# Patient Record
Sex: FEMALE | Race: WHITE | Employment: UNEMPLOYED | URBAN - METROPOLITAN AREA
[De-identification: names, ages, dates, MRNs, and addresses within clinical notes are randomized per-mention and may not be internally consistent; named-entity substitution may affect disease eponyms.]

---

## 2019-06-04 ENCOUNTER — OFFICE VISIT (OUTPATIENT)
Dept: OBGYN CLINIC | Age: 49
End: 2019-06-04
Payer: COMMERCIAL

## 2019-06-04 VITALS — BODY MASS INDEX: 35.84 KG/M2 | WEIGHT: 223 LBS | HEIGHT: 66 IN

## 2019-06-04 DIAGNOSIS — G35 CHRONIC PROGRESSIVE MULTIPLE SCLEROSIS (HCC): ICD-10-CM

## 2019-06-04 DIAGNOSIS — Z01.419 WOMEN'S ANNUAL ROUTINE GYNECOLOGICAL EXAMINATION: ICD-10-CM

## 2019-06-04 DIAGNOSIS — Z12.39 ENCOUNTER FOR SPECIAL SCREENING EXAMINATION FOR NEOPLASM OF BREAST: ICD-10-CM

## 2019-06-04 DIAGNOSIS — Z01.419 WOMEN'S ANNUAL ROUTINE GYNECOLOGICAL EXAMINATION: Primary | ICD-10-CM

## 2019-06-04 PROCEDURE — 99396 PREV VISIT EST AGE 40-64: CPT | Performed by: OBSTETRICS & GYNECOLOGY

## 2019-06-04 RX ORDER — OMEPRAZOLE 20 MG/1
20 CAPSULE, DELAYED RELEASE ORAL DAILY
COMMUNITY
End: 2020-06-18

## 2019-06-04 RX ORDER — DALFAMPRIDINE 10 MG/1
10 TABLET, FILM COATED, EXTENDED RELEASE ORAL EVERY 12 HOURS
COMMUNITY
End: 2020-06-18

## 2019-06-04 RX ORDER — BISOPROLOL FUMARATE AND HYDROCHLOROTHIAZIDE 2.5; 6.25 MG/1; MG/1
1 TABLET ORAL DAILY
COMMUNITY

## 2019-06-04 RX ORDER — ACETAMINOPHEN 160 MG
3 TABLET,DISINTEGRATING ORAL DAILY
COMMUNITY

## 2019-06-04 ASSESSMENT — ENCOUNTER SYMPTOMS
CONSTIPATION: 0
VOICE CHANGE: 0
TROUBLE SWALLOWING: 0
COLOR CHANGE: 0
WHEEZING: 0
CHEST TIGHTNESS: 0
ABDOMINAL DISTENTION: 0
ABDOMINAL PAIN: 0
COUGH: 0
SORE THROAT: 0
VOMITING: 0
BLOOD IN STOOL: 0
NAUSEA: 0
SHORTNESS OF BREATH: 0
BACK PAIN: 0

## 2019-06-04 NOTE — PROGRESS NOTES
CHIEF COMPLAINT:  No problems. q 6 month infusion for her MS which increases risk of breast cancer. MS is stable  Chief Complaint   Patient presents with    Annual Exam         HISTORY OF PRESENT ILLNESS:  47 y.o. female presents for her annual exam. She had no concernsor complaints today. Her menses is  absent. She denies breakthrough bleeding, pelvic pain, or abnormal discharge. Bowel and bladder function is normal. Her health overallhas been good. Past Medical History:   Diagnosis Date    Abnormal Pap smear of cervix     GERD with stricture     Hypertension     MS (multiple sclerosis) (HCC)      Past Surgical History:   Procedure Laterality Date     SECTION      x 2    ESOPHAGEAL DILATATION      HYSTERECTOMY  10/2015    pt states left ovaries and tube     Family History   Problem Relation Age of Onset    Diabetes Father     Hypertension Father     Hypertension Mother     Stroke Mother     Heart Disease Mother     Heart Disease Brother      Social History     Socioeconomic History    Marital status:      Spouse name: Not on file    Number of children: Not on file    Years of education: Not on file    Highest education level: Not on file   Occupational History    Not on file   Social Needs    Financial resource strain: Not on file    Food insecurity:     Worry: Not on file     Inability: Not on file    Transportation needs:     Medical: Not on file     Non-medical: Not on file   Tobacco Use    Smoking status: Never Smoker    Smokeless tobacco: Never Used   Substance and Sexual Activity    Alcohol use:  Yes    Drug use: No    Sexual activity: Yes     Partners: Male   Lifestyle    Physical activity:     Days per week: Not on file     Minutes per session: Not on file    Stress: Not on file   Relationships    Social connections:     Talks on phone: Not on file     Gets together: Not on file     Attends Uats[employer] service: Not on file     Active member of club or organization: Not on file     Attends meetings of clubs or organizations: Not on file     Relationship status: Not on file    Intimate partner violence:     Fear of current or ex partner: Not on file     Emotionally abused: Not on file     Physically abused: Not on file     Forced sexual activity: Not on file   Other Topics Concern    Not on file   Social History Narrative    Not on file     Allergies:  Patient has no known allergies. Current Outpatient Medications on File Prior to Visit   Medication Sig Dispense Refill    Cholecalciferol (VITAMIN D3) 2000 units CAPS Take 3 capsules by mouth daily      Mirabegron ER (MYRBETRIQ) 50 MG TB24 Take 1 tablet by mouth      bisoprolol-hydrochlorothiazide (ZIAC) 2.5-6.25 MG per tablet Take 1 tablet by mouth daily      dalfampridine (AMPYRA) 10 MG extended release tablet Take 10 mg by mouth every 12 hours      omeprazole (PRILOSEC) 20 MG delayed release capsule Take 20 mg by mouth daily      Ocrelizumab (OCREVUS IV) Infuse intravenously every 6 months      Cholecalciferol (VITAMIN D-3 PO) Take by mouth       No current facility-administered medications on file prior to visit. OB History        8    Para   6    Term                AB        Living   6       SAB        TAB        Ectopic        Molar        Multiple        Live Births   6              Patient's medications, allergies, past medical, surgical,social and family histories were reviewed and updated as appropriate. Review of Systems   Constitutional: Negative for activity change, appetite change, fatigue and unexpected weight change. HENT: Negative for dental problem, ear pain, hearing loss, nosebleeds, sore throat, trouble swallowing and voice change. Eyes: Negative for visual disturbance. Respiratory: Negative for cough, chest tightness, shortness of breath and wheezing. Cardiovascular: Negative for chest pain and palpitations.    Gastrointestinal: Negative for abdominal distention, abdominal pain, blood in stool, constipation, nausea and vomiting. Endocrine: Negative for cold intolerance, heat intolerance, polydipsia, polyphagia and polyuria. Genitourinary: Negative for difficulty urinating, dyspareunia, dysuria, flank pain, frequency, genital sores, hematuria, menstrual problem, pelvic pain, urgency, vaginal bleeding, vaginal discharge and vaginal pain. Musculoskeletal: Negative for arthralgias, back pain, joint swelling and myalgias. Skin: Negative for color change and rash. Allergic/Immunologic: Negative for environmental allergies, food allergies and immunocompromised state. Neurological: Negative for dizziness, seizures, syncope, speech difficulty, weakness, numbness and headaches. Hematological: Negative for adenopathy. Does not bruise/bleed easily. Psychiatric/Behavioral: Negative for agitation, behavioral problems, confusion, decreased concentration, dysphoric mood and suicidal ideas. The patient is not nervous/anxious and is not hyperactive. All other systems reviewed and are negative. PHYSICAL EXAM  Ht 5' 5.98\" (1.676 m)   Wt 223 lb (101.2 kg)   BMI 36.01 kg/m²      Physical Exam   Constitutional: She is oriented to person, place, and time. She appears well-developed and well-nourished. HENT:   Head: Normocephalic and atraumatic. Eyes: Pupils are equal, round, and reactive to light. Neck: Normal range of motion. No tracheal deviation present. No thyromegaly present. Cardiovascular: Normal rate, regular rhythm and normal heart sounds. Pulmonary/Chest: Effort normal and breath sounds normal. She exhibits no tenderness. No breast tenderness or discharge. Abdominal: Soft. Bowel sounds are normal. She exhibits no distension and no mass. There is no tenderness. There is no rebound and no guarding. Hernia confirmed negative in the left inguinal area.    Genitourinary: Rectum normal and vagina normal. Rectal exam shows no external

## 2019-06-05 LAB
CLUE CELLS: ABNORMAL
TRICHOMONAS PREP: ABNORMAL
TRICHOMONAS VAGINALIS SCREEN: NEGATIVE
YEAST WET PREP: ABNORMAL

## 2020-05-14 ASSESSMENT — ENCOUNTER SYMPTOMS
BLOOD IN STOOL: 0
NAUSEA: 0
WHEEZING: 0
ABDOMINAL PAIN: 0
CHEST TIGHTNESS: 0
CONSTIPATION: 0
BACK PAIN: 0
VOMITING: 0
SHORTNESS OF BREATH: 0
COLOR CHANGE: 0
TROUBLE SWALLOWING: 0
COUGH: 0
SORE THROAT: 0
ABDOMINAL DISTENTION: 0
VOICE CHANGE: 0

## 2020-05-14 NOTE — PROGRESS NOTES
CHIEF COMPLAINT: No GYN complaints she is status post [facility]. She is dealing with MS. Chief Complaint   Patient presents with    Annual Exam         HISTORY OF PRESENT ILLNESS:  45 y.o. female presents for her annual exam. She had no concernsor complaints today. Her menses is  absent. She denies breakthrough bleeding, pelvic pain, or abnormal discharge. Bowel and bladder function is normal. Her health overallhas been good. Past Medical History:   Diagnosis Date    Abnormal Pap smear of cervix     GERD with stricture     Hypertension     MS (multiple sclerosis) (HCC)      Past Surgical History:   Procedure Laterality Date     SECTION      x 2    ESOPHAGEAL DILATATION      HYSTERECTOMY  10/2015    pt states left ovaries and tube     Family History   Problem Relation Age of Onset    Diabetes Father     Hypertension Father     Hypertension Mother     Stroke Mother     Heart Disease Mother     Heart Disease Brother      Social History     Socioeconomic History    Marital status:      Spouse name: Not on file    Number of children: Not on file    Years of education: Not on file    Highest education level: Not on file   Occupational History    Not on file   Social Needs    Financial resource strain: Not on file    Food insecurity     Worry: Not on file     Inability: Not on file    Transportation needs     Medical: Not on file     Non-medical: Not on file   Tobacco Use    Smoking status: Never Smoker    Smokeless tobacco: Never Used   Substance and Sexual Activity    Alcohol use:  Yes    Drug use: No    Sexual activity: Yes     Partners: Male   Lifestyle    Physical activity     Days per week: Not on file     Minutes per session: Not on file    Stress: Not on file   Relationships    Social connections     Talks on phone: Not on file     Gets together: Not on file     Attends Voodoo service: Not on file     Active member of club or organization: Not on file     Attends stool, constipation, nausea and vomiting. Endocrine: Negative for cold intolerance, heat intolerance, polydipsia, polyphagia and polyuria. Genitourinary: Negative for difficulty urinating, dyspareunia, dysuria, flank pain, frequency, genital sores, hematuria, menstrual problem, pelvic pain, urgency, vaginal bleeding, vaginal discharge and vaginal pain. Musculoskeletal: Negative for arthralgias, back pain, joint swelling and myalgias. Skin: Negative for color change and rash. Allergic/Immunologic: Negative for environmental allergies, food allergies and immunocompromised state. Neurological: Negative for dizziness, seizures, syncope, speech difficulty, weakness, numbness and headaches. Hematological: Negative for adenopathy. Does not bruise/bleed easily. Psychiatric/Behavioral: Negative for agitation, behavioral problems, confusion, decreased concentration, dysphoric mood and suicidal ideas. The patient is not nervous/anxious and is not hyperactive. All other systems reviewed and are negative. PHYSICAL EXAM  There were no vitals taken for this visit. Physical Exam  Vitals signs reviewed. Exam conducted with a chaperone present. Constitutional:       Appearance: Normal appearance. Chest:       Genitourinary:            Neurological:      Mental Status: She is alert. ASSESSMENT : Routine Annual Exam      Diagnosis Orders   1. Women's annual routine gynecological examination         PLAN:  Pap smear obtained  Orders Placed This Encounter   Procedures    TRE Screening Bilateral     This order was created through External Result Entry     No orders of the defined types were placed in this encounter. Repeat Annual every 1 year. New ASCCP guidelines regarding pap and Hi Risk HPV co-testing reviewed. Cervical Cytology Evaluation begins at 24years old. If Negative Cytology, Follow-upscreening per current  ASCCP guidelines. Mammograms every 1 year.  If 35 yo and last mammogram

## 2020-06-04 ENCOUNTER — OFFICE VISIT (OUTPATIENT)
Dept: OBGYN CLINIC | Age: 50
End: 2020-06-04

## 2020-06-18 ENCOUNTER — OFFICE VISIT (OUTPATIENT)
Dept: OBGYN CLINIC | Age: 50
End: 2020-06-18
Payer: COMMERCIAL

## 2020-06-18 VITALS
BODY MASS INDEX: 36.82 KG/M2 | SYSTOLIC BLOOD PRESSURE: 122 MMHG | DIASTOLIC BLOOD PRESSURE: 64 MMHG | HEART RATE: 78 BPM | WEIGHT: 228 LBS

## 2020-06-18 PROCEDURE — 99396 PREV VISIT EST AGE 40-64: CPT | Performed by: OBSTETRICS & GYNECOLOGY

## 2020-06-18 RX ORDER — SOLIFENACIN SUCCINATE 5 MG/1
TABLET, FILM COATED ORAL
COMMUNITY
Start: 2020-05-26

## 2020-06-18 RX ORDER — OMEPRAZOLE 40 MG/1
CAPSULE, DELAYED RELEASE ORAL
COMMUNITY
Start: 2020-04-21

## 2020-06-18 RX ORDER — DALFAMPRIDINE 10 MG/1
TABLET, FILM COATED, EXTENDED RELEASE ORAL
COMMUNITY
Start: 2019-05-03 | End: 2021-07-01

## 2020-06-18 ASSESSMENT — ENCOUNTER SYMPTOMS
COLOR CHANGE: 0
TROUBLE SWALLOWING: 0
COUGH: 0
VOMITING: 0
ABDOMINAL DISTENTION: 0
SHORTNESS OF BREATH: 0
SORE THROAT: 0
CHEST TIGHTNESS: 0
CONSTIPATION: 0
VOICE CHANGE: 0
WHEEZING: 0
NAUSEA: 0
BACK PAIN: 0
BLOOD IN STOOL: 0
ABDOMINAL PAIN: 0

## 2020-11-12 NOTE — PROGRESS NOTES
LETTER MAILED TO REMIND PATIENT I will SWITCH the dose or number of times a day I take the medications listed below when I get home from the hospital:  None

## 2020-11-18 ENCOUNTER — TELEPHONE (OUTPATIENT)
Dept: OBGYN CLINIC | Age: 50
End: 2020-11-18

## 2020-11-18 NOTE — TELEPHONE ENCOUNTER
Patient received a letter that she did not do her mammogram and bone density scan. Patient states she did get them done at [place]. The results are scanned in media 10/28/20.

## 2021-07-01 ENCOUNTER — OFFICE VISIT (OUTPATIENT)
Dept: OBGYN CLINIC | Age: 51
End: 2021-07-01
Payer: COMMERCIAL

## 2021-07-01 VITALS
HEART RATE: 83 BPM | DIASTOLIC BLOOD PRESSURE: 86 MMHG | SYSTOLIC BLOOD PRESSURE: 128 MMHG | WEIGHT: 228 LBS | BODY MASS INDEX: 36.82 KG/M2

## 2021-07-01 DIAGNOSIS — Z11.51 ENCOUNTER FOR SCREENING FOR HUMAN PAPILLOMAVIRUS (HPV): ICD-10-CM

## 2021-07-01 DIAGNOSIS — N89.8 VAGINAL IRRITATION: ICD-10-CM

## 2021-07-01 DIAGNOSIS — Z01.419 WOMEN'S ANNUAL ROUTINE GYNECOLOGICAL EXAMINATION: ICD-10-CM

## 2021-07-01 DIAGNOSIS — Z01.419 WOMEN'S ANNUAL ROUTINE GYNECOLOGICAL EXAMINATION: Primary | ICD-10-CM

## 2021-07-01 DIAGNOSIS — Z12.31 ENCOUNTER FOR SCREENING MAMMOGRAM FOR BREAST CANCER: ICD-10-CM

## 2021-07-01 PROBLEM — Z90.710 H/O: HYSTERECTOMY: Status: ACTIVE | Noted: 2021-07-01

## 2021-07-01 PROBLEM — M54.9 CHRONIC BACK PAIN: Status: ACTIVE | Noted: 2021-07-01

## 2021-07-01 PROBLEM — R26.9 ABNORMAL GAIT: Status: ACTIVE | Noted: 2021-07-01

## 2021-07-01 PROBLEM — G54.9 NERVE ROOT DISORDER: Status: ACTIVE | Noted: 2021-07-01

## 2021-07-01 PROBLEM — N31.9 NEUROGENIC BLADDER: Status: ACTIVE | Noted: 2021-07-01

## 2021-07-01 PROBLEM — G89.29 CHRONIC BACK PAIN: Status: ACTIVE | Noted: 2021-07-01

## 2021-07-01 PROBLEM — G35 MULTIPLE SCLEROSIS (HCC): Status: ACTIVE | Noted: 2021-07-01

## 2021-07-01 PROBLEM — M77.12 LATERAL EPICONDYLITIS OF LEFT ELBOW: Status: ACTIVE | Noted: 2021-07-01

## 2021-07-01 PROCEDURE — 99396 PREV VISIT EST AGE 40-64: CPT | Performed by: OBSTETRICS & GYNECOLOGY

## 2021-07-01 RX ORDER — CLOTRIMAZOLE AND BETAMETHASONE DIPROPIONATE 10; .64 MG/G; MG/G
CREAM TOPICAL
Qty: 45 G | Refills: 1 | Status: SHIPPED | OUTPATIENT
Start: 2021-07-01

## 2021-07-01 RX ORDER — FAMOTIDINE 40 MG/1
TABLET, FILM COATED ORAL
COMMUNITY

## 2021-07-01 RX ORDER — ECHINACEA PURPUREA EXTRACT 125 MG
TABLET ORAL
COMMUNITY
Start: 2021-02-06

## 2021-07-01 RX ORDER — POTASSIUM CHLORIDE 750 MG/1
TABLET, FILM COATED, EXTENDED RELEASE ORAL
COMMUNITY
Start: 2021-05-17

## 2021-07-01 RX ORDER — OMEPRAZOLE 20 MG/1
CAPSULE, DELAYED RELEASE ORAL
COMMUNITY
Start: 2021-06-20

## 2021-07-01 ASSESSMENT — ENCOUNTER SYMPTOMS
ABDOMINAL PAIN: 0
SORE THROAT: 0
COUGH: 0
ABDOMINAL DISTENTION: 0
WHEEZING: 0
VOICE CHANGE: 0
SHORTNESS OF BREATH: 0
NAUSEA: 0
VOMITING: 0
COLOR CHANGE: 0
CHEST TIGHTNESS: 0
BACK PAIN: 0
BLOOD IN STOOL: 0
CONSTIPATION: 0
TROUBLE SWALLOWING: 0

## 2021-07-01 NOTE — PROGRESS NOTES
CHIEF COMPLAINT: Patient is here for an annual exam.  She is a 50-year-old female with MS. She had Covid and was hospitalized back in January. She is noticing some vulvar irritation. Well as itching. Denies painful intercourse and denies of vasomotor symptoms. Her MS is under remission  Chief Complaint   Patient presents with    Annual Exam         HISTORY OF PRESENT ILLNESS:  45 y.o. female presents for her annual exam. She had no concernsor complaints today. Her menses is absent. She denies breakthrough bleeding, pelvic pain, or abnormal discharge. Bowel and bladder function is normal. Her health overallhas been good. Past Medical History:   Diagnosis Date    Abnormal Pap smear of cervix     GERD with stricture     Hypertension     MS (multiple sclerosis) (HCC)     Stress incontinence      Past Surgical History:   Procedure Laterality Date     SECTION      x 2    ESOPHAGEAL DILATATION      HYSTERECTOMY  10/2015    pt states left ovaries and tube     Family History   Problem Relation Age of Onset    Diabetes Father     Hypertension Father     Hypertension Mother     Stroke Mother     Heart Disease Mother     Heart Disease Brother     Breast Cancer Neg Hx      Social History     Socioeconomic History    Marital status:      Spouse name: Not on file    Number of children: Not on file    Years of education: Not on file    Highest education level: Not on file   Occupational History    Not on file   Tobacco Use    Smoking status: Never Smoker    Smokeless tobacco: Never Used   Vaping Use    Vaping Use: Never used   Substance and Sexual Activity    Alcohol use:  Yes    Drug use: No    Sexual activity: Yes     Partners: Male   Other Topics Concern    Not on file   Social History Narrative    Not on file     Social Determinants of Health     Financial Resource Strain:     Difficulty of Paying Living Expenses:    Food Insecurity:     Worried About Running Out of [employer] in the Last Year:    [address] in the Last Year:    Transportation Needs:     Lack of Transportation (Medical):  Lack of Transportation (Non-Medical):    Physical Activity:     Days of Exercise per Week:     Minutes of Exercise per Session:    Stress:     Feeling of Stress :    Social Connections:     Frequency of Communication with Friends and Family:     Frequency of Social Gatherings with Friends and Family:     Attends Synagogue Services:     Active Member of Clubs or Organizations:     Attends Club or Organization Meetings:     Marital Status:    Intimate Partner Violence:     Fear of Current or Ex-Partner:     Emotionally Abused:     Physically Abused:     Sexually Abused: Allergies:  Patient has no known allergies. Current Outpatient Medications on File Prior to Visit   Medication Sig Dispense Refill    sodium chloride (DEEP SEA NASAL SPRAY) 0.65 % nasal spray by Nasal route      potassium chloride (KLOR-CON) 10 MEQ extended release tablet TAKE 1 TABLET BY MOUTH TWICE DAILY WITH FOOD      omeprazole (PRILOSEC) 20 MG delayed release capsule TAKE 1 CAPSULE BY MOUTH DAILY      famotidine (PEPCID) 40 MG tablet Take by mouth      Multiple Vitamin (MULTI-VITAMIN DAILY PO) Take by mouth      omeprazole (PRILOSEC) 40 MG delayed release capsule TK 1 C PO D      solifenacin (VESICARE) 5 MG tablet TK 1 T PO QD      mirabegron (MYRBETRIQ) 50 MG TB24 Take by mouth      Cholecalciferol (VITAMIN D3) 2000 units CAPS Take 3 capsules by mouth daily      bisoprolol-hydrochlorothiazide (ZIAC) 2.5-6.25 MG per tablet Take 1 tablet by mouth daily       No current facility-administered medications on file prior to visit.      OB History        8    Para   6    Term                AB        Living   6       SAB        TAB        Ectopic        Molar        Multiple        Live Births   6              Patient's medications, allergies, past medical, surgical,social and family histories were reviewed and updated as appropriate. Review of Systems   Constitutional: Negative for activity change, appetite change, fatigue and unexpected weight change. HENT: Negative for dental problem, ear pain, hearing loss, nosebleeds, sore throat, trouble swallowing and voice change. Eyes: Negative for visual disturbance. Respiratory: Negative for cough, chest tightness, shortness of breath and wheezing. Cardiovascular: Negative for chest pain and palpitations. Gastrointestinal: Negative for abdominal distention, abdominal pain, blood in stool, constipation, nausea and vomiting. Endocrine: Negative for cold intolerance, heat intolerance, polydipsia, polyphagia and polyuria. Genitourinary: Negative for difficulty urinating, dyspareunia, dysuria, flank pain, frequency, genital sores, hematuria, menstrual problem, pelvic pain, urgency, vaginal bleeding, vaginal discharge and vaginal pain. Musculoskeletal: Negative for arthralgias, back pain, joint swelling and myalgias. Skin: Negative for color change and rash. Allergic/Immunologic: Negative for environmental allergies, food allergies and immunocompromised state. Neurological: Negative for dizziness, seizures, syncope, speech difficulty, weakness, numbness and headaches. Hematological: Negative for adenopathy. Does not bruise/bleed easily. Psychiatric/Behavioral: Negative for agitation, behavioral problems, confusion, decreased concentration, dysphoric mood and suicidal ideas. The patient is not nervous/anxious and is not hyperactive. All other systems reviewed and are negative. PHYSICAL EXAM  /86   Pulse 83   Wt 228 lb (103.4 kg)   BMI 36.82 kg/m²     Physical Exam  Vitals and nursing note reviewed. Exam conducted with a chaperone present. Constitutional:       Appearance: She is well-developed. HENT:      Head: Normocephalic and atraumatic.    Eyes:      Pupils: Pupils are equal, round, and reactive to Question:   Reason for exam:     Answer:   Yearly mammogram     No orders of the defined types were placed in this encounter. Repeat Annual every 1 year. New ASCCP guidelines regarding pap and Hi Risk HPV co-testing reviewed. Cervical Cytology Evaluation begins at 24years old. If Negative Cytology, Follow-upscreening per current  ASCCP guidelines. Mammograms every 1 year. If 35 yo and last mammogram was negative. Calcium and Vitamin D dosing reviewed. Colonoscopy screening guidelines reviewed as well as onset forbone density testing. Birth control and barrier methods and recommendations discussed. STD counseling and prevention reviewed. Gardisil counseling completed for all patients 7-33 yo. Routine health maintenance per patients PCP.        Electronically signed by Lorena Webster DO on 7/1/21

## 2021-07-02 LAB
CLUE CELLS: NORMAL
TRICHOMONAS PREP: NORMAL
TRICHOMONAS VAGINALIS SCREEN: NEGATIVE
YEAST WET PREP: NORMAL

## 2021-07-07 LAB
HPV COMMENT: NORMAL
HPV TYPE 16: NOT DETECTED
HPV TYPE 18: NOT DETECTED
HPVOH (OTHER TYPES): NOT DETECTED

## 2022-08-10 PROBLEM — E66.9 OBESITY WITH BODY MASS INDEX 30 OR GREATER: Status: ACTIVE | Noted: 2022-08-10

## 2022-08-10 PROBLEM — K20.0 EOSINOPHILIC ESOPHAGITIS: Status: ACTIVE | Noted: 2022-08-10

## 2022-08-10 PROBLEM — M48.02 SPINAL STENOSIS OF CERVICAL REGION: Status: ACTIVE | Noted: 2022-08-10

## 2022-08-10 PROBLEM — K21.9 GASTROESOPHAGEAL REFLUX DISEASE WITHOUT ESOPHAGITIS: Status: ACTIVE | Noted: 2022-08-10

## 2022-08-10 PROBLEM — I10 HYPERTENSION: Status: ACTIVE | Noted: 2022-08-10

## 2022-08-10 PROBLEM — R20.9 SENSORY DISTURBANCE: Status: ACTIVE | Noted: 2022-08-10

## 2022-08-10 PROBLEM — G95.9 DISEASE OF SPINAL CORD (HCC): Status: ACTIVE | Noted: 2022-08-10

## 2022-08-10 PROBLEM — N39.8 DYSFUNCTIONAL VOIDING OF URINE: Status: ACTIVE | Noted: 2022-08-10

## 2022-08-11 ENCOUNTER — OFFICE VISIT (OUTPATIENT)
Dept: OBGYN CLINIC | Age: 52
End: 2022-08-11
Payer: COMMERCIAL

## 2022-08-11 VITALS
DIASTOLIC BLOOD PRESSURE: 80 MMHG | BODY MASS INDEX: 37.63 KG/M2 | WEIGHT: 233 LBS | SYSTOLIC BLOOD PRESSURE: 124 MMHG | HEART RATE: 70 BPM

## 2022-08-11 DIAGNOSIS — Z01.419 WOMEN'S ANNUAL ROUTINE GYNECOLOGICAL EXAMINATION: ICD-10-CM

## 2022-08-11 DIAGNOSIS — Z12.31 ENCOUNTER FOR SCREENING MAMMOGRAM FOR BREAST CANCER: ICD-10-CM

## 2022-08-11 DIAGNOSIS — Z12.31 ENCOUNTER FOR SCREENING MAMMOGRAM FOR BREAST CANCER: Primary | ICD-10-CM

## 2022-08-11 PROCEDURE — 99396 PREV VISIT EST AGE 40-64: CPT | Performed by: OBSTETRICS & GYNECOLOGY

## 2022-08-11 RX ORDER — VALACYCLOVIR HYDROCHLORIDE 1 G/1
TABLET, FILM COATED ORAL
COMMUNITY
Start: 2022-05-25

## 2022-08-11 RX ORDER — CLOTRIMAZOLE AND BETAMETHASONE DIPROPIONATE 10; .64 MG/G; MG/G
CREAM TOPICAL
Qty: 15 G | Refills: 3 | Status: SHIPPED | OUTPATIENT
Start: 2022-08-11

## 2022-08-11 ASSESSMENT — ENCOUNTER SYMPTOMS
CHEST TIGHTNESS: 0
COUGH: 0
SORE THROAT: 0
VOICE CHANGE: 0
NAUSEA: 0
CONSTIPATION: 0
BLOOD IN STOOL: 0
BACK PAIN: 0
ABDOMINAL DISTENTION: 0
WHEEZING: 0
COLOR CHANGE: 0
ABDOMINAL PAIN: 0
SHORTNESS OF BREATH: 0
VOMITING: 0
TROUBLE SWALLOWING: 0

## 2022-08-11 NOTE — PROGRESS NOTES
CHIEF COMPLAINT: Gynecologic complaints other than some vulvar irritation denies discharge. Does have some mild incontinence and has some moisture down in the vulvar region periodically she is wearing a pad. She has no bleeding. She has MS and it is stable with her injection  Chief Complaint   Patient presents with    Annual Exam         HISTORY OF PRESENT ILLNESS:  47 y.o. female presents for her annual exam. She had no concernsor complaints today. Her menses is absent. She denies breakthrough bleeding, pelvic pain, or abnormal discharge. Bowel and bladder function is normal. Her health overallhas been good.      Past Medical History:   Diagnosis Date    Abnormal Pap smear of cervix     GERD with stricture     Hypertension     MS (multiple sclerosis) ([facility] [address]     Stress incontinence      Past Surgical History:   Procedure Laterality Date     SECTION      x 2    ESOPHAGEAL DILATATION      HYSTERECTOMY (CERVIX STATUS UNKNOWN)  10/2015    pt states left ovaries and tube     Family History   Problem Relation Age of Onset    Diabetes Father     Hypertension Father     Hypertension Mother     Stroke Mother     Heart Disease Mother     Heart Disease Brother     Breast Cancer Neg Hx      Social History     Socioeconomic History    Marital status:      Spouse name: Not on file    Number of children: Not on file    Years of education: Not on file    Highest education level: Not on file   Occupational History    Not on file   Tobacco Use    Smoking status: Never    Smokeless tobacco: Never   Vaping Use    Vaping Use: Never used   Substance and Sexual Activity    Alcohol use: Yes    Drug use: No    Sexual activity: Yes     Partners: Male   Other Topics Concern    Not on file   Social History Narrative    Not on file     Social Determinants of Health     Financial Resource Strain: Not on file   Food Insecurity: Not on file   Transportation Needs: Not on file   Physical Activity: Not on file   Stress: Not on file tightness, shortness of breath and wheezing. Cardiovascular:  Negative for chest pain and palpitations. Gastrointestinal:  Negative for abdominal distention, abdominal pain, blood in stool, constipation, nausea and vomiting. Endocrine: Negative for cold intolerance, heat intolerance, polydipsia, polyphagia and polyuria. Genitourinary:  Negative for difficulty urinating, dyspareunia, dysuria, flank pain, frequency, genital sores, hematuria, menstrual problem, pelvic pain, urgency, vaginal bleeding, vaginal discharge and vaginal pain. Musculoskeletal:  Negative for arthralgias, back pain, joint swelling and myalgias. Skin:  Negative for color change and rash. Allergic/Immunologic: Negative for environmental allergies, food allergies and immunocompromised state. Neurological:  Negative for dizziness, seizures, syncope, speech difficulty, weakness, numbness and headaches. Hematological:  Negative for adenopathy. Does not bruise/bleed easily. Psychiatric/Behavioral:  Negative for agitation, behavioral problems, confusion, decreased concentration, dysphoric mood and suicidal ideas. The patient is not nervous/anxious and is not hyperactive. All other systems reviewed and are negative. PHYSICAL EXAM  /80   Pulse 70   Wt 233 lb (105.7 kg)   BMI 37.63 kg/m²     Physical Exam  Vitals and nursing note reviewed. Exam conducted with a chaperone present. Chest:       Genitourinary:                ASSESSMENT : Routine Annual Exam      Diagnosis Orders   1. Encounter for screening mammogram for breast cancer  TRE DIGITAL SCREEN W OR WO CAD BILATERAL    Wet prep, genital      2. Women's annual routine gynecological examination  Wet prep, genital      Vulvar irritation    PLAN: Annual mammogram.  Lotrisone cream.  Wet prep pending call with results.   Patient to follow-up in 1 year  Pap smear not obtained  Orders Placed This Encounter   Procedures    Wet prep, genital     Standing Status:   Future Standing Expiration Date:   8/11/2023    TRE DIGITAL SCREEN W OR WO CAD BILATERAL     Standing Status:   Future     Standing Expiration Date:   10/8/2023     Order Specific Question:   Reason for exam:     Answer:   Yearly mammogram     Orders Placed This Encounter   Medications    clotrimazole-betamethasone (LOTRISONE) 1-0.05 % cream     Sig: Apply topically 2 times daily. Dispense:  15 g     Refill:  3       Repeat Annual every 1 year. New ASCCP guidelines regarding pap and Hi Risk HPV co-testing reviewed. Cervical Cytology Evaluation begins at 24years old. If Negative Cytology, Follow-upscreening per current  ASCCP guidelines. Mammograms every 1 year. If 35 yo and last mammogram was negative. Calcium and Vitamin D dosing reviewed. Colonoscopy screening guidelines reviewed as well as onset forbone density testing. Birth control and barrier methods and recommendations discussed. STD counseling and prevention reviewed. Gardisil counseling completed for all patients 7-35 yo. Routine health maintenance per patients PCP.        Electronically signed by Rupesh Bolanos DO on 8/11/22

## 2022-09-08 DIAGNOSIS — Z12.31 ENCOUNTER FOR SCREENING MAMMOGRAM FOR BREAST CANCER: ICD-10-CM

## 2022-09-08 DIAGNOSIS — R92.8 ABNORMAL MAMMOGRAM OF RIGHT BREAST: Primary | ICD-10-CM

## 2023-04-14 ENCOUNTER — APPOINTMENT (OUTPATIENT)
Dept: LAB | Facility: LAB | Age: 53
End: 2023-04-14
Payer: COMMERCIAL

## 2023-04-14 LAB
BASOPHILS (10*3/UL) IN BLOOD BY AUTOMATED COUNT: 0.06 X10E9/L (ref 0–0.1)
BASOPHILS/100 LEUKOCYTES IN BLOOD BY AUTOMATED COUNT: 1.3 % (ref 0–2)
EOSINOPHILS (10*3/UL) IN BLOOD BY AUTOMATED COUNT: 0.16 X10E9/L (ref 0–0.7)
EOSINOPHILS/100 LEUKOCYTES IN BLOOD BY AUTOMATED COUNT: 3.5 % (ref 0–6)
ERYTHROCYTE DISTRIBUTION WIDTH (RATIO) BY AUTOMATED COUNT: 13.3 % (ref 11.5–14.5)
ERYTHROCYTE MEAN CORPUSCULAR HEMOGLOBIN CONCENTRATION (G/DL) BY AUTOMATED: 32.5 G/DL (ref 32–36)
ERYTHROCYTE MEAN CORPUSCULAR VOLUME (FL) BY AUTOMATED COUNT: 98 FL (ref 80–100)
ERYTHROCYTES (10*6/UL) IN BLOOD BY AUTOMATED COUNT: 4.23 X10E12/L (ref 4–5.2)
HEMATOCRIT (%) IN BLOOD BY AUTOMATED COUNT: 41.6 % (ref 36–46)
HEMOGLOBIN (G/DL) IN BLOOD: 13.5 G/DL (ref 12–16)
IGA (MG/DL) IN SER/PLAS: 55 MG/DL (ref 70–400)
IGG (MG/DL) IN SER/PLAS: 449 MG/DL (ref 700–1600)
IGM (MG/DL) IN SER/PLAS: 32 MG/DL (ref 40–230)
IMMATURE GRANULOCYTES/100 LEUKOCYTES IN BLOOD BY AUTOMATED COUNT: 0.7 % (ref 0–0.9)
LEUKOCYTES (10*3/UL) IN BLOOD BY AUTOMATED COUNT: 4.5 X10E9/L (ref 4.4–11.3)
LYMPHOCYTES (10*3/UL) IN BLOOD BY AUTOMATED COUNT: 1.22 X10E9/L (ref 1.2–4.8)
LYMPHOCYTES/100 LEUKOCYTES IN BLOOD BY AUTOMATED COUNT: 26.9 % (ref 13–44)
MONOCYTES (10*3/UL) IN BLOOD BY AUTOMATED COUNT: 0.41 X10E9/L (ref 0.1–1)
MONOCYTES/100 LEUKOCYTES IN BLOOD BY AUTOMATED COUNT: 9 % (ref 2–10)
NEUTROPHILS (10*3/UL) IN BLOOD BY AUTOMATED COUNT: 2.66 X10E9/L (ref 1.2–7.7)
NEUTROPHILS/100 LEUKOCYTES IN BLOOD BY AUTOMATED COUNT: 58.6 % (ref 40–80)
NRBC (PER 100 WBCS) BY AUTOMATED COUNT: 0 /100 WBC (ref 0–0)
PLATELETS (10*3/UL) IN BLOOD AUTOMATED COUNT: 238 X10E9/L (ref 150–450)

## 2023-04-17 LAB
CD19 ABSOLUTE: 0.02 X10E9/L (ref 0.07–0.91)
CD19%: 2 % (ref 6–19)
CD19+CD24++CD38++%: 48.5 % (ref 1–3.6)
CD19+CD24-CD38++%: 2.5 % (ref 0.6–1.6)
CD19+CD27+IGD+%: 1.2 % (ref 13.4–21.4)
CD19+CD27+IGD-%: 0.8 % (ref 9.2–18.9)
CD19+CD27-IGD+%: 95.1 % (ref 58–72.1)
CD45%: 100 %
FMETH: ABNORMAL
FSIT1: ABNORMAL
MARKER INTERPRETATION: ABNORMAL
PV191: NORMAL

## 2023-10-01 ENCOUNTER — PHARMACY VISIT (OUTPATIENT)
Dept: PHARMACY | Facility: CLINIC | Age: 53
End: 2023-10-01
Payer: COMMERCIAL

## 2023-10-01 PROCEDURE — RXMED WILLOW AMBULATORY MEDICATION CHARGE

## 2023-10-05 DIAGNOSIS — Z79.899 DRUG-INDUCED IMMUNODEFICIENCY (MULTI): ICD-10-CM

## 2023-10-05 DIAGNOSIS — G35 MULTIPLE SCLEROSIS (MULTI): Primary | ICD-10-CM

## 2023-10-05 DIAGNOSIS — D84.821 DRUG-INDUCED IMMUNODEFICIENCY (MULTI): ICD-10-CM

## 2023-10-05 RX ORDER — DALFAMPRIDINE 10 MG/1
10 TABLET, FILM COATED, EXTENDED RELEASE ORAL EVERY 12 HOURS SCHEDULED
Qty: 60 TABLET | Refills: 5 | Status: SHIPPED | OUTPATIENT
Start: 2023-10-05 | End: 2024-03-01 | Stop reason: ALTCHOICE

## 2023-10-05 RX ORDER — DIPHENHYDRAMINE HYDROCHLORIDE 50 MG/ML
50 INJECTION INTRAMUSCULAR; INTRAVENOUS ONCE
Status: CANCELLED | OUTPATIENT
Start: 2023-11-20

## 2023-10-05 RX ORDER — METHYLPREDNISOLONE SODIUM SUCCINATE 40 MG/ML
40 INJECTION INTRAMUSCULAR; INTRAVENOUS AS NEEDED
Status: CANCELLED | OUTPATIENT
Start: 2023-11-20

## 2023-10-05 RX ORDER — ALBUTEROL SULFATE 0.83 MG/ML
3 SOLUTION RESPIRATORY (INHALATION) AS NEEDED
Status: CANCELLED | OUTPATIENT
Start: 2023-11-20

## 2023-10-05 RX ORDER — ACETAMINOPHEN 325 MG/1
650 TABLET ORAL ONCE
Status: CANCELLED | OUTPATIENT
Start: 2023-11-20

## 2023-10-05 RX ORDER — EPINEPHRINE 0.3 MG/.3ML
0.3 INJECTION SUBCUTANEOUS EVERY 5 MIN PRN
Status: CANCELLED | OUTPATIENT
Start: 2023-11-20

## 2023-10-05 RX ORDER — FAMOTIDINE 10 MG/ML
20 INJECTION INTRAVENOUS ONCE AS NEEDED
Status: CANCELLED | OUTPATIENT
Start: 2023-11-20

## 2023-10-05 RX ORDER — DIPHENHYDRAMINE HYDROCHLORIDE 50 MG/ML
50 INJECTION INTRAMUSCULAR; INTRAVENOUS AS NEEDED
Status: CANCELLED | OUTPATIENT
Start: 2023-11-20

## 2023-10-11 PROBLEM — E66.812 CLASS 2 OBESITY: Status: ACTIVE | Noted: 2021-10-28

## 2023-10-11 PROBLEM — R13.10 DYSPHAGIA: Status: ACTIVE | Noted: 2023-10-11

## 2023-10-11 PROBLEM — M54.16 LUMBAR RADICULOPATHY: Status: ACTIVE | Noted: 2023-10-11

## 2023-10-11 PROBLEM — G62.9 NEUROPATHY: Status: ACTIVE | Noted: 2023-10-11

## 2023-10-11 PROBLEM — D22.9 ATYPICAL NEVUS: Status: ACTIVE | Noted: 2018-02-06

## 2023-10-11 PROBLEM — D64.9 ANEMIA: Status: ACTIVE | Noted: 2023-10-11

## 2023-10-11 PROBLEM — M54.10 RADICULOPATHY: Status: ACTIVE | Noted: 2023-10-11

## 2023-10-11 PROBLEM — R26.9 ABNORMAL GAIT: Status: ACTIVE | Noted: 2021-07-01

## 2023-10-11 PROBLEM — R30.0 DYSURIA: Status: ACTIVE | Noted: 2023-10-11

## 2023-10-11 PROBLEM — L72.0 INCLUSION CYST: Status: ACTIVE | Noted: 2018-02-06

## 2023-10-11 PROBLEM — R92.8 ABNORMAL MAMMOGRAM: Status: ACTIVE | Noted: 2022-09-08

## 2023-10-11 PROBLEM — E66.9 CLASS 2 OBESITY: Status: RESOLVED | Noted: 2021-10-28 | Resolved: 2023-10-11

## 2023-10-11 PROBLEM — N39.41 URGE INCONTINENCE OF URINE: Status: ACTIVE | Noted: 2023-10-11

## 2023-10-11 PROBLEM — M48.061 LUMBAR STENOSIS: Status: ACTIVE | Noted: 2023-10-11

## 2023-10-11 PROBLEM — N31.9 NEUROGENIC BLADDER: Status: ACTIVE | Noted: 2021-07-01

## 2023-10-11 PROBLEM — M54.2 CHRONIC NECK PAIN: Status: ACTIVE | Noted: 2023-10-11

## 2023-10-11 PROBLEM — C50.919 BREAST CANCER (MULTI): Status: ACTIVE | Noted: 2023-10-11

## 2023-10-11 PROBLEM — M99.02 SOMATIC DYSFUNCTION OF THORACIC REGION: Status: ACTIVE | Noted: 2023-10-11

## 2023-10-11 PROBLEM — H92.09 OTALGIA: Status: ACTIVE | Noted: 2020-06-10

## 2023-10-11 PROBLEM — B02.29 POSTHERPETIC NEURALGIA: Status: ACTIVE | Noted: 2022-09-07

## 2023-10-11 PROBLEM — K21.9 GASTROESOPHAGEAL REFLUX DISEASE WITHOUT ESOPHAGITIS: Status: ACTIVE | Noted: 2022-08-10

## 2023-10-11 PROBLEM — G89.29 CHRONIC BACK PAIN: Status: ACTIVE | Noted: 2018-06-12

## 2023-10-11 PROBLEM — G89.29 CHRONIC NECK PAIN: Status: ACTIVE | Noted: 2023-10-11

## 2023-10-11 PROBLEM — E53.8 VITAMIN B12 DEFICIENCY: Status: ACTIVE | Noted: 2023-10-11

## 2023-10-11 PROBLEM — M48.02 SPINAL STENOSIS OF CERVICAL REGION: Status: ACTIVE | Noted: 2022-08-10

## 2023-10-11 PROBLEM — K21.9 CHRONIC GERD: Status: ACTIVE | Noted: 2023-10-11

## 2023-10-11 PROBLEM — E66.01 MORBID OBESITY (MULTI): Status: ACTIVE | Noted: 2021-11-01

## 2023-10-11 PROBLEM — R20.9 SENSORY DISTURBANCE: Status: ACTIVE | Noted: 2022-08-10

## 2023-10-11 PROBLEM — E66.812 CLASS 2 OBESITY: Status: RESOLVED | Noted: 2021-10-28 | Resolved: 2023-10-11

## 2023-10-11 PROBLEM — E66.9 CLASS 2 OBESITY: Status: ACTIVE | Noted: 2021-10-28

## 2023-10-11 PROBLEM — G54.9 NERVE ROOT DISORDER: Status: ACTIVE | Noted: 2021-07-01

## 2023-10-11 PROBLEM — D80.1 HYPOGAMMAGLOBULINEMIA, ACQUIRED (MULTI): Status: ACTIVE | Noted: 2023-10-11

## 2023-10-11 PROBLEM — N39.8 DYSFUNCTIONAL VOIDING OF URINE: Status: ACTIVE | Noted: 2022-08-10

## 2023-10-11 PROBLEM — Z90.710 HISTORY OF HYSTERECTOMY: Status: ACTIVE | Noted: 2018-09-18

## 2023-10-11 PROBLEM — K20.0 EOSINOPHILIC ESOPHAGITIS: Status: ACTIVE | Noted: 2022-08-10

## 2023-10-11 PROBLEM — G95.9 DISEASE OF SPINAL CORD (MULTI): Status: ACTIVE | Noted: 2022-08-10

## 2023-10-11 PROBLEM — R29.898 DECREASED GRIP STRENGTH OF RIGHT HAND: Status: ACTIVE | Noted: 2023-10-11

## 2023-10-11 PROBLEM — M54.9 CHRONIC BACK PAIN: Status: ACTIVE | Noted: 2018-06-12

## 2023-10-11 PROBLEM — Z78.9 IMPAIRED MOTOR CONTROL: Status: ACTIVE | Noted: 2023-10-11

## 2023-10-11 PROBLEM — H61.23 IMPACTED CERUMEN OF BOTH EARS: Status: ACTIVE | Noted: 2020-06-10

## 2023-10-11 PROBLEM — R27.8 DECREASED COORDINATION: Status: ACTIVE | Noted: 2023-10-11

## 2023-10-11 PROBLEM — M77.12 LATERAL EPICONDYLITIS OF LEFT ELBOW: Status: ACTIVE | Noted: 2021-07-01

## 2023-10-11 RX ORDER — BISOPROLOL FUMARATE AND HYDROCHLOROTHIAZIDE 2.5; 6.25 MG/1; MG/1
TABLET ORAL
COMMUNITY

## 2023-10-11 RX ORDER — FUROSEMIDE 20 MG/1
20 TABLET ORAL DAILY
COMMUNITY
Start: 2023-02-10

## 2023-10-11 RX ORDER — POLYMYXIN B SULFATE AND TRIMETHOPRIM 1; 10000 MG/ML; [USP'U]/ML
SOLUTION OPHTHALMIC
COMMUNITY
Start: 2022-11-03

## 2023-10-11 RX ORDER — BISOPROLOL FUMARATE AND HYDROCHLOROTHIAZIDE 5; 6.25 MG/1; MG/1
TABLET ORAL
COMMUNITY

## 2023-10-11 RX ORDER — ONDANSETRON HYDROCHLORIDE 2 MG/ML
INJECTION, SOLUTION INTRAVENOUS
COMMUNITY
Start: 2021-05-18

## 2023-10-11 RX ORDER — MIRABEGRON 50 MG/1
50 TABLET, EXTENDED RELEASE ORAL NIGHTLY
COMMUNITY
Start: 2017-09-14 | End: 2024-03-01 | Stop reason: SDUPTHER

## 2023-10-11 RX ORDER — ZINC GLUCONATE 50 MG
1 TABLET ORAL DAILY
COMMUNITY
Start: 2023-02-22

## 2023-10-11 RX ORDER — MELOXICAM 15 MG/1
1 TABLET ORAL DAILY
COMMUNITY
Start: 2022-04-04

## 2023-10-11 RX ORDER — FAMOTIDINE 40 MG/1
TABLET, FILM COATED ORAL
COMMUNITY

## 2023-10-11 RX ORDER — CELECOXIB 200 MG/1
200 CAPSULE ORAL
COMMUNITY

## 2023-10-11 RX ORDER — LEVOFLOXACIN 750 MG/1
750 TABLET ORAL DAILY
COMMUNITY
Start: 2023-05-30

## 2023-10-11 RX ORDER — GABAPENTIN 300 MG/1
300 CAPSULE ORAL 2 TIMES DAILY
COMMUNITY

## 2023-10-11 RX ORDER — FLUCONAZOLE 150 MG/1
TABLET ORAL
COMMUNITY
Start: 2023-08-14

## 2023-10-11 RX ORDER — SOLIFENACIN SUCCINATE 5 MG/1
TABLET, FILM COATED ORAL
COMMUNITY
Start: 2020-05-26 | End: 2024-03-01 | Stop reason: SINTOL

## 2023-10-11 RX ORDER — CYANOCOBALAMIN (VITAMIN B-12) 500 MCG
TABLET ORAL
COMMUNITY

## 2023-10-11 RX ORDER — GUAIFENESIN 600 MG/1
1 TABLET, EXTENDED RELEASE ORAL 2 TIMES DAILY
COMMUNITY
Start: 2023-02-15

## 2023-10-11 RX ORDER — POLYETHYLENE GLYCOL 3350 17 G/17G
POWDER, FOR SOLUTION ORAL
COMMUNITY
Start: 2017-09-15

## 2023-10-11 RX ORDER — ONDANSETRON HYDROCHLORIDE 8 MG/1
8 TABLET, FILM COATED ORAL EVERY 8 HOURS PRN
COMMUNITY
End: 2023-12-11 | Stop reason: SDUPTHER

## 2023-10-11 RX ORDER — BACLOFEN 10 MG/1
TABLET ORAL
COMMUNITY
End: 2023-11-13 | Stop reason: ENTERED-IN-ERROR

## 2023-10-11 RX ORDER — VENLAFAXINE HYDROCHLORIDE 37.5 MG/1
CAPSULE, EXTENDED RELEASE ORAL
COMMUNITY
Start: 2014-11-24

## 2023-10-11 RX ORDER — OXYCODONE AND ACETAMINOPHEN 5; 325 MG/1; MG/1
TABLET ORAL
COMMUNITY
Start: 2022-10-25

## 2023-10-11 RX ORDER — POTASSIUM CHLORIDE 750 MG/1
1 TABLET, FILM COATED, EXTENDED RELEASE ORAL
COMMUNITY
Start: 2021-05-17

## 2023-10-11 RX ORDER — OLANZAPINE 5 MG/1
5 TABLET ORAL NIGHTLY
COMMUNITY
Start: 2023-03-25

## 2023-10-11 RX ORDER — MOMETASONE FUROATE 1 MG/G
CREAM TOPICAL
COMMUNITY
Start: 2023-04-10

## 2023-10-11 RX ORDER — OMEPRAZOLE 20 MG/1
20 CAPSULE, DELAYED RELEASE ORAL DAILY
COMMUNITY

## 2023-10-11 RX ORDER — DIPHENHYDRAMINE HCL 25 MG
TABLET ORAL
COMMUNITY

## 2023-10-11 RX ORDER — OMEPRAZOLE 40 MG/1
CAPSULE, DELAYED RELEASE ORAL
COMMUNITY
Start: 2020-04-21

## 2023-10-11 RX ORDER — ACETAMINOPHEN 500 MG
3 TABLET ORAL DAILY
COMMUNITY

## 2023-10-11 RX ORDER — CLOTRIMAZOLE AND BETAMETHASONE DIPROPIONATE 10; .64 MG/G; MG/G
CREAM TOPICAL 2 TIMES DAILY
COMMUNITY
Start: 2021-07-01

## 2023-10-11 RX ORDER — AMITRIPTYLINE HYDROCHLORIDE 50 MG/1
TABLET, FILM COATED ORAL
COMMUNITY

## 2023-10-11 RX ORDER — FERROUS SULFATE 325(65) MG
325 TABLET ORAL DAILY
COMMUNITY
Start: 2023-03-25

## 2023-10-12 ENCOUNTER — TREATMENT (OUTPATIENT)
Dept: PHYSICAL THERAPY | Facility: CLINIC | Age: 53
End: 2023-10-12
Payer: COMMERCIAL

## 2023-10-12 DIAGNOSIS — G35 MULTIPLE SCLEROSIS (MULTI): ICD-10-CM

## 2023-10-12 DIAGNOSIS — R26.9 ABNORMAL GAIT: Primary | ICD-10-CM

## 2023-10-12 DIAGNOSIS — R29.898 RIGHT LEG WEAKNESS: ICD-10-CM

## 2023-10-12 PROCEDURE — 97112 NEUROMUSCULAR REEDUCATION: CPT | Mod: GP | Performed by: PHYSICAL THERAPIST

## 2023-10-12 PROCEDURE — 97116 GAIT TRAINING THERAPY: CPT | Mod: GP | Performed by: PHYSICAL THERAPIST

## 2023-10-12 ASSESSMENT — ENCOUNTER SYMPTOMS
DEPRESSION: 0
LOSS OF SENSATION IN FEET: 0
OCCASIONAL FEELINGS OF UNSTEADINESS: 1

## 2023-10-12 NOTE — PROGRESS NOTES
Physical Therapy    Physical Therapy Treatment    Patient Name: Shu Dimas  MRN: [id number]  Today's Date: 10/12/2023  Time Calculation  Start Time: 0758  Stop Time: 0858  Time Calculation (min): 60 min  Visit # 11    Assessment:   Pt. Reports greater ease of right foot clearance with left heel lift. She plans to purchase an adjustable heel lift as instructed. We discussed the indications and potential benefits of botox to right gastroc/soleus. She would like me to recommend botox to her neurologist, Dr. Calloway who she sees soon. He can then refer her to Dr. Noonan or Dr. Centeno for botox.   DF PROM: right = 3 deg knee extended, and 10 deg knee flexed. Left 17 deg with knee extended  Botox in conjunction with daily stretching may help prevent further gastroc/soleus contracture.   Plan:  Continue Current plan of care     Current Problem  1. Abnormal gait  Follow Up In Physical Therapy      2. Multiple sclerosis (CMS/HCC)  Follow Up In Physical Therapy      3. Right leg weakness  Follow Up In Physical Therapy          Subjective   General  No new concerns or issues. No new falls. Walks into clinic with rollator.   Precautions  Moderate fall risk  Pain  None stated    Objective     Treatments:  Gait training: Ambulated 215 ft with rollator, then ambulated 215 ft with rollator and 1 inch left heel lift to determine if right foot clearance and energy expenditure improved. Pt. Reports greater ease of right foot clearance with left heel lift. She plans to purchase a heel lift as instructed.   Neuromuscular Re-education (04980): timed minutes 45, units 2 .   Recumbent bike LEs only 3 min forward / 3 min backward  - standing right gastroc stretch   - 4 inch RLE step ups (R foot stays on first step) working to release the quad in a controlled manner eccentrically and preventing hyperextension concentrically   - stepping right foot over cane and back (left foot on 2 inch step)  Not performed today:  RLE NMR in left sidelying  with RLEon powderboard:  - manual right quad and hip flexor stretch  - right hip and knee flexion / extension PNF patterns. Intermittent manual assist. 5 sets of 5  - isolated right knee flexion and extension w/ intermittent manual assist   - supine assisted R heel slide starting with knee flexed   - seated LAQ and hamstring curl  - seated R ankle eversion. 3 x 7     Standing:  - staggered stance left foot forward heel-toe rocking for PF-DF.   - standing right hip flexion (left foot on 2 inch block).    - HF stretch with R knee on chair - stepping forward with L LE. rolling walker for UE support    - standing quad stretch with right knee on chair sitting back into stretch   - standing gastroc stretch on incline   - seated HS curls against theraband resistance - focus on activation and relaxation, not ROM.         OP EDUCATION:       Goals:  Balance: Patient will demonstrate 16/16 on Tinetti Balance test as evidence of improved postural control and balance during transitions and standing activities in the home.   Flexibility: Patient will demonstrate at least 10 degrees of DF in sitting position as evidence of improving flexibility and allowing improved gait mechanics for improved safety during gait.   Gait/Locomotion: Patient will demonstrate 9/12 on Tinetti gait with rolling walker as evidence of improved safety and independence during gait with rolling walker in home and community.   Motor Function/Control/Learning: Patient will demonstrate 22/34 on R LE Fugl-Rowe as evidence of improved motor control during functional mobility.   Strength: Patient will demonstrate independence and compliance with HEP as evidence of patient's investment and engagement in progressing her safety and independence during functional mobility with rollator.   Transfers: Patient will demonstrate TUG time of 14 seconds with rollator as evidence of improved safety and balance during functional mobility.    Active       PT Problem       We  are continuing the PT plan of care established in the legacy EMR. Refer to POC and goals in EMR and daily note for details.        Start:  10/12/23

## 2023-10-13 DIAGNOSIS — R26.89 BALANCE PROBLEMS: ICD-10-CM

## 2023-10-13 DIAGNOSIS — G35 MULTIPLE SCLEROSIS (MULTI): Primary | ICD-10-CM

## 2023-10-13 NOTE — PROGRESS NOTES
PT called to discuss possible Botox for Right gastroc and soleus to possibly help gait and foot drop with assist with brace.

## 2023-10-16 ENCOUNTER — TREATMENT (OUTPATIENT)
Dept: PHYSICAL THERAPY | Facility: CLINIC | Age: 53
End: 2023-10-16
Payer: COMMERCIAL

## 2023-10-16 DIAGNOSIS — R29.898 RIGHT LEG WEAKNESS: Primary | ICD-10-CM

## 2023-10-16 DIAGNOSIS — R26.9 ABNORMAL GAIT: ICD-10-CM

## 2023-10-16 DIAGNOSIS — G35 MULTIPLE SCLEROSIS (MULTI): ICD-10-CM

## 2023-10-16 PROCEDURE — 97112 NEUROMUSCULAR REEDUCATION: CPT | Mod: GP | Performed by: PHYSICAL THERAPIST

## 2023-10-16 NOTE — PROGRESS NOTES
Physical Therapy    Physical Therapy Treatment    Patient Name: Shu Dimas  MRN: [id number]  Today's Date: 10/16/2023  Time Calculation  Start Time: 1545  Stop Time: 1630  Time Calculation (min): 45 min  Visit # 12    Assessment:  Pt. Felt more tired than usual due to later appt time but her performance was stable today.  Upon my recommendation her neurologist provided a referral for a botox consultation with Dr. Noonan. She is scheduled for next month. She will receive her left heel lift this week.   DF PROM: right = 3 deg knee extended, and 10 deg knee flexed. Left 17 deg with knee extended    Plan:  Continue Current plan of care     Current Problem  1. Right leg weakness        2. Multiple sclerosis (CMS/HCC)        3. Abnormal gait            Subjective   General  No new concerns or issues. No new falls. Walks into clinic with rollator.   Precautions  Moderate fall risk  Pain  None stated    Treatments:  Gait training: Ambulated 215 ft with rollator, then ambulated 215 ft with rollator and 1 inch left heel lift to determine if right foot clearance and energy expenditure improved. Pt. Reports greater ease of right foot clearance with left heel lift. She plans to purchase a heel lift as instructed.   Neuromuscular Re-education (36618): timed minutes 45, units 2 .   Recumbent bike LEs only 3 min forward / 3 min backward  - standing right gastroc stretch       - sidestepping in // bars. 10ft x 6      - squats with TB right knee extension resistance. Green. X 20  - stepping right foot over cane and back (left foot on 2 inch step)  - dylan position manual right quad stretch  - dylan position right hip flexion  - seated right knee flexion with TB resistance. Yellow. 2 x 12    Not performed today:  - 4 inch RLE step ups (R foot stays on first step) working to release the quad in a controlled manner eccentrically and preventing hyperextension concentrically   RLE NMR in left sidelying with RLEon powderboard:  -  manual right quad and hip flexor stretch  - right hip and knee flexion / extension PNF patterns. Intermittent manual assist. 5 sets of 5  - isolated right knee flexion and extension w/ intermittent manual assist   - supine assisted R heel slide starting with knee flexed     Goals:  Balance: Patient will demonstrate 16/16 on Tinetti Balance test as evidence of improved postural control and balance during transitions and standing activities in the home.   Flexibility: Patient will demonstrate at least 10 degrees of DF in sitting position as evidence of improving flexibility and allowing improved gait mechanics for improved safety during gait.   Gait/Locomotion: Patient will demonstrate 9/12 on Tinetti gait with rolling walker as evidence of improved safety and independence during gait with rolling walker in home and community.   Motor Function/Control/Learning: Patient will demonstrate 22/34 on R LE Fugl-Rowe as evidence of improved motor control during functional mobility.   Strength: Patient will demonstrate independence and compliance with HEP as evidence of patient's investment and engagement in progressing her safety and independence during functional mobility with rollator.   Transfers: Patient will demonstrate TUG time of 14 seconds with rollator as evidence of improved safety and balance during functional mobility.    Active       PT Problem       We are continuing the PT plan of care established in the legacy EMR. Refer to POC and goals in EMR and daily note for details.        Start:  10/12/23

## 2023-10-20 ENCOUNTER — OFFICE VISIT (OUTPATIENT)
Dept: NEUROLOGY | Facility: HOSPITAL | Age: 53
End: 2023-10-20
Payer: COMMERCIAL

## 2023-10-20 VITALS
HEART RATE: 84 BPM | BODY MASS INDEX: 36.16 KG/M2 | SYSTOLIC BLOOD PRESSURE: 117 MMHG | TEMPERATURE: 97.1 F | RESPIRATION RATE: 18 BRPM | DIASTOLIC BLOOD PRESSURE: 82 MMHG | HEIGHT: 66 IN | WEIGHT: 225 LBS

## 2023-10-20 DIAGNOSIS — G35 MULTIPLE SCLEROSIS (MULTI): Primary | ICD-10-CM

## 2023-10-20 PROCEDURE — 3074F SYST BP LT 130 MM HG: CPT | Performed by: PSYCHIATRY & NEUROLOGY

## 2023-10-20 PROCEDURE — 99214 OFFICE O/P EST MOD 30 MIN: CPT | Performed by: PSYCHIATRY & NEUROLOGY

## 2023-10-20 PROCEDURE — 1036F TOBACCO NON-USER: CPT | Performed by: PSYCHIATRY & NEUROLOGY

## 2023-10-20 PROCEDURE — 3079F DIAST BP 80-89 MM HG: CPT | Performed by: PSYCHIATRY & NEUROLOGY

## 2023-10-20 ASSESSMENT — PAIN SCALES - GENERAL: PAINLEVEL: 0-NO PAIN

## 2023-10-20 ASSESSMENT — ENCOUNTER SYMPTOMS
DEPRESSION: 0
LOSS OF SENSATION IN FEET: 1

## 2023-10-20 NOTE — PROGRESS NOTES
Provider Impressions  Shu Dimas is a 54yo with primary progressive MS presenting for follow up visit. Her exam is stable and she feels better after having finished chemo/radiations and resumed PT. MRI stable. Continue Ocrevus, will see her back in 6 months. .      Chief Complaint  FUV for MS    History of Present Illness  PRINCIPAL NEUROLOGIC DIAGNOSIS: MS    DISEASE SUMMARY/DIAGNOSTICS:  Onset: ~   Diagnosis of MS:   Previous disease therapies: IVMP May 2016, Copaxone  Current disease therapies: Resumed Ocrevus, last infusion 2023 new induction after chemo, next infusion due in 2023  Most recent MRI brain:  vs 2021, stable inactive  Most recent MRI cervical spine: 2019: stable, no changes compared to old scans  Most recent MRI thoracic spine: 11/15  CSF: 16, prot 50, high IgG index and synthesis rate, pos OCB  Ig/22 493 (stable)    Interval Hx:  She is s/p chemotherapy and radiations for breast cancer. Resumed Ocrevus in 2023. She feels she is the same, maybe a bit better now that she is doing PT again. No new symptoms, no infections.       Active Problems  Problems    · Abnormality of gait and mobility (781.2) (R26.9)   · At risk for falls (V15.88) (Z91.81)   · Back pain, chronic (724.5,338.29) (M54.9,G89.29)   · Breast cancer (174.9) (C50.919)   · Diagnosed in 2022   · Cervical stenosis of spine (723.0) (M48.02)   · Chronic neck pain (723.1,338.29) (M54.2,G89.29)   · Decreased coordination (781.3) (R27.8)   · Decreased  strength of right hand (729.89) (R29.898)   · Dysuria (788.1) (R30.0)   · Hypogammaglobulinemia, acquired (279.00) (D80.1)   · Impaired motor control (V49.89) (Z78.9)   · Lateral epicondylitis of left elbow (726.32) (M77.12)   · Leg weakness (729.89) (R29.898)   · Limb pain (729.5) (M79.609)   · Lumbar radiculopathy (724.4) (M54.16)   · Lumbar stenosis (724.02) (M48.061)   · Multiple allergies (V15.09) (Z88.9)   · Multiple sclerosis (340) (G35)   ·  Muscle weakness of right upper extremity (728.87) (M62.81)   · Myelopathy (336.9) (G95.9)   · Nausea in adult (787.02) (R11.0)   · Neurogenic bladder (596.54) (N31.9)   · Radiculopathy (729.2) (M54.10)   · Sensation of pressure in bladder area (596.89) (R39.89)   · Sensory disturbance (782.0) (R20.9)   · Somatic dysfunction of thoracic region (739.2) (M99.02)   · Urge incontinence of urine (788.31) (N39.41)   · Urinary tract infection (599.0) (N39.0)   · Voiding dysfunction (599.9) (N39.8)      Current Outpatient Medications:     bisoproloL-hydrochlorothiazide (Ziac) 2.5-6.25 mg tablet, TAKE 1 TABLET BY MOUTH EVERY DAY IN THE MORNING, Disp: , Rfl:     cholecalciferol (Vitamin D-3) 50 mcg (2,000 unit) capsule, Take 3 capsules (6,000 Units) by mouth once daily., Disp: , Rfl:     dalfampridine (Ampyra) 10 mg tablet extended release 12 hr 12 hr tablet, Take 1 tablet (10 mg) by mouth every 12 hours., Disp: 60 tablet, Rfl: 5    diphenhydrAMINE (Benadryl Allergy) 25 mg tablet, Benadryl Allergy 25 MG Oral Tablet  Refills: 0     Active, Disp: , Rfl:     mirabegron (Myrbetriq) 50 mg tablet extended release 24 hr 24 hr tablet, Take 1 tablet (50 mg) by mouth once daily at bedtime., Disp: , Rfl:     ocrelizumab (Ocrevus) 30 mg/mL, Infuse 20 mL (600 mg total) into a venous catheter every 6 months., Disp: 20 mL, Rfl: 0    omeprazole (PriLOSEC) 40 mg DR capsule, TK 1 C PO D, Disp: , Rfl:     ondansetron (Zofran) 4 mg/2 mL injection, Infuse 4 mg IVPB once as needed for nausea and vomiting during Ocrevus infusion. Withdraw 4mg and add to 50 mL of normal saline. Infuse over 15 minutes., Disp: , Rfl:     ondansetron (Zofran) 8 mg tablet, Take 1 tablet (8 mg) by mouth every 8 hours if needed for nausea., Disp: , Rfl:     polyethylene glycol (Miralax) 17 gram/dose powder, MIX 1 CAPFUL (17GM) IN 8 OUNCES OF WATER, JUICE, OR TEA AND DRINK DAILY., Disp: , Rfl:     amitriptyline (Elavil) 50 mg tablet, Take 50 mg by mouth daily at bedtime.,  Disp: , Rfl:     baclofen (Lioresal) 10 mg tablet, Take 10 mg by mouth. Take one half tablet by mouth three times daily, Disp: , Rfl:     bisoproloL-hydrochlorothiazide (Ziac) 5-6.25 mg tablet, TAKE 1 TABLET BY MOUTH IN THE MORNING, Disp: , Rfl:     celecoxib (CeleBREX) 200 mg capsule, Take 1 capsule (200 mg) by mouth once daily., Disp: , Rfl:     cholecalciferol (Vitamin D-3) 10 MCG (400 UNIT) tablet, Vitamin D3 10 MCG (400 UNIT) Oral Tablet  Refills: 0     Active, Disp: , Rfl:     clotrimazole-betamethasone (Lotrisone) cream, Apply topically 2 times a day., Disp: , Rfl:     famotidine (Pepcid) 40 mg tablet, Take by mouth., Disp: , Rfl:     ferrous sulfate 325 (65 Fe) MG tablet, Take 1 tablet (325 mg) by mouth once daily., Disp: , Rfl:     fluconazole (Diflucan) 150 mg tablet, , Disp: , Rfl:     furosemide (Lasix) 20 mg tablet, Take 1 tablet (20 mg) by mouth once daily., Disp: , Rfl:     gabapentin (Neurontin) 300 mg capsule, Take 1 capsule (300 mg) by mouth 2 times a day., Disp: , Rfl:     guaiFENesin (Mucinex) 600 mg 12 hr tablet, Take 1 tablet (600 mg) by mouth 2 times a day., Disp: , Rfl:     levoFLOXacin (Levaquin) 750 mg tablet, Take 1 tablet (750 mg) by mouth once daily., Disp: , Rfl:     meloxicam (Mobic) 15 mg tablet, Take 1 tablet (15 mg) by mouth once daily., Disp: , Rfl:     mometasone (Elocon) 0.1 % cream, APPLY TOPICALLY TO RADIATION SITE ONCE DAILY AFTER RADIATION TREATMENTS, Disp: , Rfl:     multivit-min/ferrous fumarate (MULTI VITAMIN ORAL), Take 1 tablet by mouth once daily., Disp: , Rfl:     OLANZapine (ZyPREXA) 5 mg tablet, Take 1 tablet (5 mg) by mouth once daily at bedtime., Disp: , Rfl:     omeprazole (PriLOSEC) 20 mg DR capsule, Take 1 capsule (20 mg) by mouth once daily., Disp: , Rfl:     oxyCODONE-acetaminophen (Percocet) 5-325 mg tablet, TAKE 1 TABLET BY MOUTH EVERY 4 TO 6 HOURS FOR 3 DAYS AS NEEDED FOR PAIN, Disp: , Rfl:     polymyxin B sulf-trimethoprim (Polytrim) ophthalmic solution, ,  Disp: , Rfl:     potassium chloride CR 10 mEq ER tablet, Take 1 tablet (10 mEq) by mouth 2 times a day with meals., Disp: , Rfl:     sodium chloride (Ayr) 0.65 % nasal drops, by Nasal route, Disp: , Rfl:     solifenacin (VESIcare) 5 mg tablet, TK 1 T PO QD, Disp: , Rfl:     venlafaxine XR (Effexor-XR) 37.5 mg 24 hr capsule, Take 1 capsule at bedtime for 5 days then increase to 2 caps at bedtime., Disp: , Rfl:     Vitamin B-12 1,000 mcg tablet, Take 1 tablet (1,000 mcg) by mouth once daily., Disp: , Rfl:      Vitals  Visit Vitals  /82   Pulse 84   Temp 36.2 °C (97.1 °F)   Resp 18      Physical Exam  The patient was alert and oriented to person, place, and time with normal language, attention and concentration, recent and remote memory and intellectual function. Horizontal saccadic hypermetrias on all directions. Has mild spastic dysarthria on exam.     Muscle Strenght (#/5):  Upper extremity    Deltoids Right 5 Left 5  Biceps Right 5 Left 5   Triceps Right 5 Left 5   Right 4+ Left 5  Finger abduction R 4+ L 5  APB R 4+ L 5    Lower extremity    Iliopsoas Right 2 Left 5-  Quadriceps Right 5- Left 5  Tibialis ant Right 4- Left 5  Gastrocn Right 5- Left 5  Hamstrings Right 4 Left 5    Sensation: Mild decreased sensation to light touch noted in b/l finger tips ( R worse than L)    Standard gait with walker was paretic on the right with foot drop. Mild RLE spasticity.

## 2023-10-23 ENCOUNTER — APPOINTMENT (OUTPATIENT)
Dept: PHYSICAL THERAPY | Facility: CLINIC | Age: 53
End: 2023-10-23
Payer: COMMERCIAL

## 2023-10-26 ENCOUNTER — OFFICE VISIT (OUTPATIENT)
Dept: OBGYN CLINIC | Age: 53
End: 2023-10-26
Payer: COMMERCIAL

## 2023-10-26 ENCOUNTER — PHARMACY VISIT (OUTPATIENT)
Dept: PHARMACY | Facility: CLINIC | Age: 53
End: 2023-10-26
Payer: COMMERCIAL

## 2023-10-26 VITALS
BODY MASS INDEX: 36.16 KG/M2 | HEIGHT: 66 IN | SYSTOLIC BLOOD PRESSURE: 124 MMHG | DIASTOLIC BLOOD PRESSURE: 66 MMHG | WEIGHT: 225 LBS | HEART RATE: 85 BPM

## 2023-10-26 DIAGNOSIS — Z01.419 WOMEN'S ANNUAL ROUTINE GYNECOLOGICAL EXAMINATION: Primary | ICD-10-CM

## 2023-10-26 DIAGNOSIS — Z12.31 SCREENING MAMMOGRAM FOR BREAST CANCER: ICD-10-CM

## 2023-10-26 PROCEDURE — 99396 PREV VISIT EST AGE 40-64: CPT | Performed by: OBSTETRICS & GYNECOLOGY

## 2023-10-26 PROCEDURE — 3074F SYST BP LT 130 MM HG: CPT | Performed by: OBSTETRICS & GYNECOLOGY

## 2023-10-26 PROCEDURE — 3078F DIAST BP <80 MM HG: CPT | Performed by: OBSTETRICS & GYNECOLOGY

## 2023-10-26 RX ORDER — FLUCONAZOLE 150 MG/1
TABLET ORAL
COMMUNITY
Start: 2023-08-14

## 2023-10-26 RX ORDER — DALFAMPRIDINE 10 MG/1
TABLET, FILM COATED, EXTENDED RELEASE ORAL 2 TIMES DAILY
COMMUNITY

## 2023-10-26 RX ORDER — BISOPROLOL FUMARATE AND HYDROCHLOROTHIAZIDE 5; 6.25 MG/1; MG/1
1 TABLET ORAL EVERY MORNING
COMMUNITY
Start: 2023-08-14 | End: 2023-10-26

## 2023-10-26 SDOH — ECONOMIC STABILITY: INCOME INSECURITY: HOW HARD IS IT FOR YOU TO PAY FOR THE VERY BASICS LIKE FOOD, HOUSING, MEDICAL CARE, AND HEATING?: NOT HARD AT ALL

## 2023-10-26 SDOH — ECONOMIC STABILITY: FOOD INSECURITY: WITHIN THE PAST 12 MONTHS, THE FOOD YOU BOUGHT JUST DIDN'T LAST AND YOU DIDN'T HAVE MONEY TO GET MORE.: NEVER TRUE

## 2023-10-26 SDOH — ECONOMIC STABILITY: FOOD INSECURITY: WITHIN THE PAST 12 MONTHS, YOU WORRIED THAT YOUR FOOD WOULD RUN OUT BEFORE YOU GOT MONEY TO BUY MORE.: NEVER TRUE

## 2023-10-26 SDOH — ECONOMIC STABILITY: HOUSING INSECURITY
IN THE LAST 12 MONTHS, WAS THERE A TIME WHEN YOU DID NOT HAVE A STEADY PLACE TO SLEEP OR SLEPT IN A SHELTER (INCLUDING NOW)?: NO

## 2023-10-26 ASSESSMENT — PATIENT HEALTH QUESTIONNAIRE - PHQ9
2. FEELING DOWN, DEPRESSED OR HOPELESS: 0
SUM OF ALL RESPONSES TO PHQ QUESTIONS 1-9: 0
1. LITTLE INTEREST OR PLEASURE IN DOING THINGS: 0
SUM OF ALL RESPONSES TO PHQ QUESTIONS 1-9: 0
SUM OF ALL RESPONSES TO PHQ9 QUESTIONS 1 & 2: 0

## 2023-10-31 ENCOUNTER — TREATMENT (OUTPATIENT)
Dept: PHYSICAL THERAPY | Facility: CLINIC | Age: 53
End: 2023-10-31
Payer: COMMERCIAL

## 2023-10-31 DIAGNOSIS — R29.898 RIGHT LEG WEAKNESS: Primary | ICD-10-CM

## 2023-10-31 PROCEDURE — 97116 GAIT TRAINING THERAPY: CPT | Mod: GP | Performed by: PHYSICAL THERAPIST

## 2023-10-31 PROCEDURE — 97112 NEUROMUSCULAR REEDUCATION: CPT | Mod: GP | Performed by: PHYSICAL THERAPIST

## 2023-10-31 NOTE — PROGRESS NOTES
Physical Therapy    Physical Therapy Treatment    Patient Name: Shu Dimas  MRN: [id number]  Today's Date: 10/31/2023  Time Calculation  Start Time: 0800  Stop Time: 0857  Time Calculation (min): 57 min  Visit # 13    Assessment:  Trialed wrapping right foot into dorsiflexion for gait. Initially it appeared that she cleared her foot better with improved DF positioning, but in time, she began to drag the same as prior to wrapping. She brought in her left heel lift, so I put it in the shoe for her with all three layers for approximately 1/2 inch left. Even with heel lift and DF wrap, right foot drag persists due to lack of active hip flexion and knee flexion of the right leg.   DF PROM: right = 3 deg knee extended, and 10 deg knee flexed. Left 17 deg with knee extended    Plan:  Continue Current plan of care     Current Problem  1. Right leg weakness            Subjective   General  No new concerns or issues. No new falls. Walks into clinic with rollator.   Precautions  Moderate fall risk  Pain  None stated    Treatments:  Neuromuscular Re-education (65611): timed minutes 45, units 2 .   Recumbent bike LEs only 3 min forward / 3 min backward  - standing right gastroc stretch       - sidestepping in // bars. 10ft x 6      - squats with TB right knee extension resistance. Green. X 20  - standing right hip flexion touching thigh to t-ball in // bars   - dylan position manual right quad stretch  - dylan position rhythmic right knee extension and flexion  - seated right knee flexion with TB resistance. Yellow. 2 x 12    Not performed today:  - 4 inch RLE step ups (R foot stays on first step) working to release the quad in a controlled manner eccentrically and preventing hyperextension concentrically   RLE NMR in left sidelying with RLEon powderboard:  - manual right quad and hip flexor stretch  - right hip and knee flexion / extension PNF patterns. Intermittent manual assist. 5 sets of 5  - isolated right knee flexion  and extension w/ intermittent manual assist   - supine assisted R heel slide starting with knee flexed     Gait Training:  Ambulated 220 ft with left heel lift and rollator. Then Ambulated 220 ft with right ankle wrapped into dorsiflexion.     Goals:  Balance: Patient will demonstrate 16/16 on Tinetti Balance test as evidence of improved postural control and balance during transitions and standing activities in the home.   Flexibility: Patient will demonstrate at least 10 degrees of DF in sitting position as evidence of improving flexibility and allowing improved gait mechanics for improved safety during gait.   Gait/Locomotion: Patient will demonstrate 9/12 on Tinetti gait with rolling walker as evidence of improved safety and independence during gait with rolling walker in home and community.   Motor Function/Control/Learning: Patient will demonstrate 22/34 on R LE Fugl-Rowe as evidence of improved motor control during functional mobility.   Strength: Patient will demonstrate independence and compliance with HEP as evidence of patient's investment and engagement in progressing her safety and independence during functional mobility with rollator.   Transfers: Patient will demonstrate TUG time of 14 seconds with rollator as evidence of improved safety and balance during functional mobility.    Active       PT Problem       We are continuing the PT plan of care established in the legacy EMR. Refer to POC and goals in EMR and daily note for details.        Start:  10/12/23

## 2023-11-07 ENCOUNTER — TREATMENT (OUTPATIENT)
Dept: PHYSICAL THERAPY | Facility: CLINIC | Age: 53
End: 2023-11-07
Payer: COMMERCIAL

## 2023-11-07 DIAGNOSIS — G35 MULTIPLE SCLEROSIS (MULTI): ICD-10-CM

## 2023-11-07 DIAGNOSIS — R29.898 RIGHT LEG WEAKNESS: Primary | ICD-10-CM

## 2023-11-07 DIAGNOSIS — R26.9 ABNORMAL GAIT: ICD-10-CM

## 2023-11-07 PROCEDURE — 97112 NEUROMUSCULAR REEDUCATION: CPT | Mod: GP | Performed by: PHYSICAL THERAPIST

## 2023-11-07 PROCEDURE — 97116 GAIT TRAINING THERAPY: CPT | Mod: GP | Performed by: PHYSICAL THERAPIST

## 2023-11-07 NOTE — PROGRESS NOTES
Physical Therapy    Physical Therapy Treatment    Patient Name: Shu Dimas  MRN: [id number]  Today's Date: 11/7/2023  In time: 8:05  Out time: 9:00  Visit # 14    Assessment:  She has her 1/2 inch left heel lift in today. She feels that it helps her clear her right foot a little better. She was able to clear the step with her right foot with minimal catching of her toe on the step during right step taps. She did fairly well ambulating on the treadmill with less right toe drag than overground.   DF PROM: right = 3 deg knee extended, and 10 deg knee flexed. Left 17 deg with knee extended    Plan:  Continue Current plan of care     Current Problem  1. Right leg weakness        2. Abnormal gait        3. Multiple sclerosis (CMS/HCC)            Subjective   General  No new concerns or issues. No new falls. Walks into clinic with rollator and left heel lift in.   Precautions  Moderate fall risk  Pain  None stated    Treatments:  Neuromuscular Re-education (05535): timed minutes 45, units 2 .   Recumbent bike LEs only 3 min forward / 3 min backward  - A to P rocking on rockarboard for ankle activation and stretching. 3 secs x 30  - standing right gastroc stretch on step x 3 mins      - sidestepping in // bars. 10ft x 6      - squats with TB right knee extension resistance. Green. X 20      - 4 inch RLE step ups (R foot stays on first step) working to release the quad in a controlled manner eccentrically and preventing hyperextension concentrically   - lifting right foot up to 4 inch step and back down highlighting right hip and knee flexion. (Left foot stays on 1st step)    Not performed today:  - seated right knee flexion with TB resistance. Yellow. 2 x 12  - dylan position manual right quad stretch  - dylan position rhythmic right knee extension and flexion  RLE NMR in left sidelying with RLEon powderboard:  - manual right quad and hip flexor stretch  - right hip and knee flexion / extension PNF patterns.  Intermittent manual assist. 5 sets of 5  - isolated right knee flexion and extension w/ intermittent manual assist   - supine assisted R heel slide starting with knee flexed     Gait Training:  Treadmill gait training in Jascha harness system for safety. Ambulated 7 minutes x 2 trials working on right hip and knee flexion to improve right foot clearance.     Goals:  Balance: Patient will demonstrate 16/16 on Tinetti Balance test as evidence of improved postural control and balance during transitions and standing activities in the home.   Flexibility: Patient will demonstrate at least 10 degrees of DF in sitting position as evidence of improving flexibility and allowing improved gait mechanics for improved safety during gait.   Gait/Locomotion: Patient will demonstrate 9/12 on Tinetti gait with rolling walker as evidence of improved safety and independence during gait with rolling walker in home and community.   Motor Function/Control/Learning: Patient will demonstrate 22/34 on R LE Fugl-Rowe as evidence of improved motor control during functional mobility.   Strength: Patient will demonstrate independence and compliance with HEP as evidence of patient's investment and engagement in progressing her safety and independence during functional mobility with rollator.   Transfers: Patient will demonstrate TUG time of 14 seconds with rollator as evidence of improved safety and balance during functional mobility.    Active       PT Problem       We are continuing the PT plan of care established in the legacy EMR. Refer to POC and goals in EMR and daily note for details.        Start:  10/12/23

## 2023-11-09 ENCOUNTER — SPECIALTY PHARMACY (OUTPATIENT)
Dept: PHARMACY | Facility: CLINIC | Age: 53
End: 2023-11-09

## 2023-11-13 ENCOUNTER — OFFICE VISIT (OUTPATIENT)
Dept: NEUROLOGY | Facility: HOSPITAL | Age: 53
End: 2023-11-13
Payer: COMMERCIAL

## 2023-11-13 ENCOUNTER — SPECIALTY PHARMACY (OUTPATIENT)
Dept: PHARMACY | Facility: CLINIC | Age: 53
End: 2023-11-13

## 2023-11-13 ENCOUNTER — PHARMACY VISIT (OUTPATIENT)
Dept: PHARMACY | Facility: CLINIC | Age: 53
End: 2023-11-13
Payer: COMMERCIAL

## 2023-11-13 VITALS
HEART RATE: 79 BPM | HEIGHT: 66 IN | DIASTOLIC BLOOD PRESSURE: 75 MMHG | SYSTOLIC BLOOD PRESSURE: 132 MMHG | WEIGHT: 225 LBS | BODY MASS INDEX: 36.16 KG/M2

## 2023-11-13 DIAGNOSIS — R25.2 SPASTICITY: ICD-10-CM

## 2023-11-13 DIAGNOSIS — M62.838 MUSCLE SPASM: Primary | ICD-10-CM

## 2023-11-13 PROCEDURE — 3075F SYST BP GE 130 - 139MM HG: CPT | Performed by: PSYCHIATRY & NEUROLOGY

## 2023-11-13 PROCEDURE — RXMED WILLOW AMBULATORY MEDICATION CHARGE

## 2023-11-13 PROCEDURE — 3078F DIAST BP <80 MM HG: CPT | Performed by: PSYCHIATRY & NEUROLOGY

## 2023-11-13 PROCEDURE — 99215 OFFICE O/P EST HI 40 MIN: CPT | Performed by: PSYCHIATRY & NEUROLOGY

## 2023-11-13 PROCEDURE — 1036F TOBACCO NON-USER: CPT | Performed by: PSYCHIATRY & NEUROLOGY

## 2023-11-13 RX ORDER — BACLOFEN 10 MG/1
10 TABLET ORAL NIGHTLY
Qty: 30 TABLET | Refills: 3 | Status: SHIPPED | OUTPATIENT
Start: 2023-11-13 | End: 2023-11-27 | Stop reason: SINTOL

## 2023-11-13 ASSESSMENT — VISUAL ACUITY: VA_NORMAL: 1

## 2023-11-13 NOTE — PROGRESS NOTES
This is a 54 yo right handed female patient with hx of MS, breast cancer s/p lumpectomy/radiation/chemotherapy, and HTN who is referred by Dr. Calloway for advanced spasticity evaluation. She is seen along with her . Will share the note with the referring physician via EMR      Cause of spasticity: MS  Cerebral spasticity: possible  Spinal spasticity: yes  Mobility: walker  Transfers: on her own   Falls: yes twice a year  AFO:  could not walk with it. She has a heel lift on the left.      Spasticity interferes with residual function: yes spasticity affects walking.   Spasticity interferes with ROM and posture: ankle feels stiff but no abnormal posture  Spasticity causes pain and/or discomfort:  infrequent isometric spasms in the right calf, twice a year or so, painful, relieved by movement.   Spasticity interferes with hygiene or skin care: no   UTIs and last urine check: yes but no effect on spasticity.   temperature effect: very cold weather causes her whole body to be tight     Tonic spasms:   Flexor: no   Extensor: no   Adductor:  no   Isometric (cramps):   infrequent isometric spasms in the right calf, twice a year or so, painful, relieved by movement.   Complex (more than 1 at a time): no   Painful:  yes see above   Focal dystonia:   no   Myoclonus:  no  Spontaneous clonus: no     Tremor:  no   RLS:  no      Treatment:  Baclofen: took it in the past but stopped it because it wasn't helping. Does not recall the dose.   Tizanidine:  not taking   Dantrolene: not taking  Gabapentin: took it in the past for shingles   pregabalin: no   Tegretol:  no   Trileptal:  no   Anticholinergics: no    Dopamine agonist: no   Botulinum toxin: here to discuss.   Baclofen pump: no  Other treatments: no      PT: doing currently, helpful   OT: did in the past, helpful   Stretching: yes daily   Exercise: no but used to do stationary bike in the past.      Goals of spasticity management:   Function: improve walking and gait.    ROM/posture:  no goals  Comfort:  no goals  Hygiene and skin care:  no goals.            Spasticity exam:    Modified Doris Scale (MAS)  Elbow Extension Score: Slight increase at the end of the range of motion  Elbow Flexion Score: No increase  Wrist Extension Score: No increase  Finger Extension Score: No increase  Thumb ABduction Score: No increase  Hamstring Extension Score: Slight increase, minimal resistance throughout the remainder (less than half) of the range of motion  Quadriceps Extension Score: Slight increase, minimal resistance throughout the remainder (less than half) of the range of motion  Ankle Dorsiflexion-Gastrocnemius Score: Considerable increase  Ankle Dorsiflexion-Soleus Score: Considerable increase  Modified Doris Scale Comments: on the right      [place] Spasm Frequency Scale:      Score = 1     ----------------------------  Extensor spasms: no   Flexor spasms: no   Adductor spasms: no  Isometric spasms: not on exam  Complex spasms: no  Abnormal fixed posture or fixed dystonia: no  Paroxysmal focal dystonia: no  Inducible clonus: two beats of unsustained ankle clonus on the left  Spontaneous clonus: no   Myoclonus: no  Tremor: no  RLS/akathisia: no               Objective   Neurological Exam  Mental Status  Awake, alert and oriented to person, place and time. Oriented to person, place and time. Recent and remote memory are intact. Speech is normal. Language is fluent with no aphasia.    Cranial Nerves  CN II: Visual acuity is normal. Visual fields full to confrontation.  CN III, IV, VI: Extraocular movements intact bilaterally. Normal lids and orbits bilaterally. Pupils equal round and reactive to light bilaterally.  CN V: Facial sensation is normal.  CN VII: Full and symmetric facial movement.  CN VIII: Hearing is normal.  CN IX, X: Palate elevates symmetrically. Normal gag reflex.  CN XI: Shoulder shrug strength is normal.  CN XII: Tongue midline without atrophy or  fasciculations.    Motor   Increased muscle tone. No abnormal involuntary movements.  4/5 RUE  2/5 right hip flexion  4-/5 distal RLE.    Sensory  No hemihyposthesia.    Reflexes                                            Right                      Left  Brachioradialis                    4+                         3+  Biceps                                 4+                         3+  Triceps                                3+                         3+  Patellar                                3+                         3+  Achilles                                3+                         4+    Coordination  Right: Finger-to-nose abnormality:Left: Finger-to-nose abnormality:  Mixed sensory > motor UE ataxia bilaterally more on the right. .    Gait    Circumduction gait with a walker and inability to clear the floor with the right foot. .  Physical Exam  Eyes:      General: Lids are normal.      Extraocular Movements: Extraocular movements intact.      Pupils: Pupils are equal, round, and reactive to light.   Neurological:      Deep Tendon Reflexes:      Reflex Scores:       Tricep reflexes are 3+ on the right side and 3+ on the left side.       Bicep reflexes are 4+ on the right side and 3+ on the left side.       Brachioradialis reflexes are 4+ on the right side and 3+ on the left side.       Patellar reflexes are 3+ on the right side and 3+ on the left side.       Achilles reflexes are 3+ on the right side and 4+ on the left side.  Psychiatric:         Speech: Speech normal.            Assessment/Plan   This is a 45 yo originally right handed female with hx of MS, breast cancer s/p lumpectomy/radiation/chemotherapy, and HTN who is referred for advanced spasticity management. Main concern is improving gait and right ankle stiffness. Exam positive for RLE weakness with moderate spasticity of the right hemibody most pronounced around the right ankle. No tonic spasms, myoclonus, or tremor on exam. oral baclofen has  been ineffective in the past but she cannot recall the maximum dose she tried. Patient can be a good candidate for repeat oral baclofen trial and targeted botulinum toxin injection to the following muscles on the right: med GC 25 units, lat GC 25 units, and soleus 50 units. The doses can be increased over time as tolerated. ITB is not indicated given focal spasticity and lack of sufficient oral and injectable trials.      PLAN:  I think you will be a good candidate for botox to improve the range of motion and posture of your right arm and ankle. Please keep in mind that botox does not make you stronger.      I will start the process to authorize botox for you. First step is to call Lisa at [phone] #3 to schedule your injections directly. We are booking out a couple month in advance and she will do the prior authorization a month in advance and have the toxin ordered for that date.      In the meantime, please try a small dose of baclofen      The impression and plan were discussed with the patient and family (if present) in details and all questions answered.      Will share the note with the referring physician via EMR     Bobby Noonan MD

## 2023-11-13 NOTE — PATIENT INSTRUCTIONS
I think you will be a good candidate for botox to relieve the spasticity around your ankle. First step is to call Lisa at [phone] option 3 to schedule your injections directly. We are booking out a couple month in advance. We might schedule you with another neurologist for injection if wait time is too long.     In the meantime, please try a small dose of oral baclofen 10 mg: take half a tablet at night for one week then increase to one full tablet after that if no side effects.     Please continue daily stretching and physical therapy.     Thank you visiting the clinic today    Bobby Noonan MD

## 2023-11-14 ENCOUNTER — TREATMENT (OUTPATIENT)
Dept: PHYSICAL THERAPY | Facility: CLINIC | Age: 53
End: 2023-11-14
Payer: COMMERCIAL

## 2023-11-14 DIAGNOSIS — G35 MULTIPLE SCLEROSIS (MULTI): ICD-10-CM

## 2023-11-14 DIAGNOSIS — R29.898 RIGHT LEG WEAKNESS: Primary | ICD-10-CM

## 2023-11-14 DIAGNOSIS — R26.9 ABNORMAL GAIT: ICD-10-CM

## 2023-11-14 PROCEDURE — 97116 GAIT TRAINING THERAPY: CPT | Mod: GP | Performed by: PHYSICAL THERAPIST

## 2023-11-14 PROCEDURE — 97112 NEUROMUSCULAR REEDUCATION: CPT | Mod: GP | Performed by: PHYSICAL THERAPIST

## 2023-11-14 NOTE — PROGRESS NOTES
Physical Therapy    Physical Therapy Treatment    Patient Name: Shu Dimas  MRN: [id number]  Today's Date: 11/14/2023    Visit # 15    Assessment:  We focused on gait training for most of the session today.  The quality of her right step and her right foot clearance was much improved with theraband assist. Right knee flexion was adequate with theraband assisting. We will monitor her response to baclofen and continue her stretching program to address spasticity.     DF PROM: right = 3 deg knee extended, and 10 deg knee flexed. Left 17 deg with knee extended    Plan:  Continue Current plan of care     Current Problem  1. Right leg weakness        2. Abnormal gait        3. Multiple sclerosis (CMS/HCC)            Subjective   General  No new concerns or issues. She saw Dr. Noonan in neurology yesterday for a spasticity consultation. She started baclofen and will titrate up each week.  Plans to schedule botox appointment in the future.     Precautions  Moderate fall risk  Pain  None stated    Treatments:  Neuromuscular Re-education (74117):  Recumbent bike LEs only 3 min forward / 3 min backward  Standing gastroc stretch. X 3 mins each    Not performed today:  - standing right gastroc stretch on step x 3 mins      - sidestepping in // bars. 10ft x 6      - squats with TB right knee extension resistance. Green. X 20      - 4 inch RLE step ups (R foot stays on first step) working to release the quad in a controlled manner eccentrically and preventing hyperextension concentrically   - lifting right foot up to 4 inch step and back down highlighting right hip and knee flexion. (Left foot stays on 1st step)  - seated right knee flexion with TB resistance. Yellow. 2 x 12  - dylan position manual right quad stretch  - dylan position rhythmic right knee extension and flexion  RLE NMR in left sidelying with RLEon powderboard:  - manual right quad and hip flexor stretch  - right hip and knee flexion / extension PNF patterns.  Intermittent manual assist. 5 sets of 5  - isolated right knee flexion and extension w/ intermittent manual assist   - supine assisted R heel slide starting with knee flexed     Gait Training:  Treadmill gait training With theraband around right  forefoot tied to gait belt to assist with dorsiflexion and knee flexion.  Ambulated 7 minutes x 2 trials working on right hip and knee flexion to improve right foot clearance.   Gait training overground with theraband assist on RLE x 230 ft.   Gait assessment after theraband was removed x 200 ft. Compared to baseline, right Foot clearance was improved after she completed training with theraband assist.     Goals:  Balance: Patient will demonstrate 16/16 on Tinetti Balance test as evidence of improved postural control and balance during transitions and standing activities in the home.   Flexibility: Patient will demonstrate at least 10 degrees of DF in sitting position as evidence of improving flexibility and allowing improved gait mechanics for improved safety during gait.   Gait/Locomotion: Patient will demonstrate 9/12 on Tinetti gait with rolling walker as evidence of improved safety and independence during gait with rolling walker in home and community.   Motor Function/Control/Learning: Patient will demonstrate 22/34 on R LE Fugl-Rowe as evidence of improved motor control during functional mobility.   Strength: Patient will demonstrate independence and compliance with HEP as evidence of patient's investment and engagement in progressing her safety and independence during functional mobility with rollator.   Transfers: Patient will demonstrate TUG time of 14 seconds with rollator as evidence of improved safety and balance during functional mobility.    Active       PT Problem       We are continuing the PT plan of care established in the legacy EMR. Refer to POC and goals in EMR and daily note for details.        Start:  10/12/23

## 2023-11-21 ENCOUNTER — TREATMENT (OUTPATIENT)
Dept: PHYSICAL THERAPY | Facility: CLINIC | Age: 53
End: 2023-11-21
Payer: COMMERCIAL

## 2023-11-21 DIAGNOSIS — R29.898 RIGHT LEG WEAKNESS: Primary | ICD-10-CM

## 2023-11-21 DIAGNOSIS — R26.9 ABNORMAL GAIT: ICD-10-CM

## 2023-11-21 DIAGNOSIS — G35 MULTIPLE SCLEROSIS (MULTI): ICD-10-CM

## 2023-11-21 PROCEDURE — 97112 NEUROMUSCULAR REEDUCATION: CPT | Mod: GP | Performed by: PHYSICAL THERAPIST

## 2023-11-21 PROCEDURE — 97116 GAIT TRAINING THERAPY: CPT | Mod: GP | Performed by: PHYSICAL THERAPIST

## 2023-11-21 NOTE — PROGRESS NOTES
Physical Therapy    Physical Therapy Treatment    Patient Name: Shu Dimas  MRN: [id number]  Today's Date: 11/21/2023    Visit # 16    Assessment:  She felt very tight today so we spent the first portion of the session on stretching.  She wasn't able to stretch and move as much this week due to the side effects of the baclofen. Even though she was only taking half a pill of baclofen she said that she couldn't function while on it so she stopped taking it 4 days ago.  Since stopping it she feels back to normal.   Used theraband around right foot and tied to her posterior waist to facilitate hip flexion, knee flexion, dorsiflexion  during gait. After T-band was removed she retained improved knee flexion and foot clearance - good carry over.     DF PROM: right = 3 deg knee extended, and 10 deg knee flexed. Left 17 deg with knee extended    Plan:  Continue Current plan of care     Current Problem  1. Right leg weakness        2. Abnormal gait        3. Multiple sclerosis (CMS/HCC)            Subjective   General  She wasn't able to stretch and move as much this week due to the side effects of the baclofen. Even though she was only taking half a pill of baclofen she said that she couldn't function while on it so she stopped taking it 4 days ago.  Since stopping it she feels back to normal.     Precautions  Moderate fall risk  Pain  None stated    Treatments:  Neuromuscular Re-education (15684):  Recumbent bike LEs only 3 min forward / 3 min backward  Standing gastroc stretch. X 3 mins each  Anuj position manual quad stretching x 4 minutes bilaterally  Seated right knee flexion with t-band. Yellow t-band  Sidestepping in // bars with UE support  Standing right hip flexion x 30 total. Very difficult    Not performed today:  - standing right gastroc stretch on step x 3 mins      - sidestepping in // bars. 10ft x 6      - squats with TB right knee extension resistance. Green. X 20      - 4 inch RLE step ups (R foot stays  on first step) working to release the quad in a controlled manner eccentrically and preventing hyperextension concentrically   - lifting right foot up to 4 inch step and back down highlighting right hip and knee flexion. (Left foot stays on 1st step)  - seated right knee flexion with TB resistance. Yellow. 2 x 12  - ydlan position manual right quad stretch  - dylan position rhythmic right knee extension and flexion  RLE NMR in left sidelying with RLEon powderboard:  - manual right quad and hip flexor stretch  - right hip and knee flexion / extension PNF patterns. Intermittent manual assist. 5 sets of 5  - isolated right knee flexion and extension w/ intermittent manual assist   - supine assisted R heel slide starting with knee flexed     Gait Training:  Overground gait training With theraband around right  forefoot tied to gait belt to assist with dorsiflexion and knee flexion x 230 ft.  Gait training after theraband was removed x 200 ft. Compared to baseline, right Foot clearance was improved after she completed training with theraband assist.     Goals:  Balance: Patient will demonstrate 16/16 on Tinetti Balance test as evidence of improved postural control and balance during transitions and standing activities in the home.   Flexibility: Patient will demonstrate at least 10 degrees of DF in sitting position as evidence of improving flexibility and allowing improved gait mechanics for improved safety during gait.   Gait/Locomotion: Patient will demonstrate 9/12 on Tinetti gait with rolling walker as evidence of improved safety and independence during gait with rolling walker in home and community.   Motor Function/Control/Learning: Patient will demonstrate 22/34 on R LE Fugl-Rowe as evidence of improved motor control during functional mobility.   Strength: Patient will demonstrate independence and compliance with HEP as evidence of patient's investment and engagement in progressing her safety and independence  during functional mobility with rollator.   Transfers: Patient will demonstrate TUG time of 14 seconds with rollator as evidence of improved safety and balance during functional mobility.    Active       PT Problem       We are continuing the PT plan of care established in the legacy EMR. Refer to POC and goals in EMR and daily note for details.        Start:  10/12/23

## 2023-11-27 ENCOUNTER — TELEPHONE (OUTPATIENT)
Dept: NEUROLOGY | Facility: CLINIC | Age: 53
End: 2023-11-27
Payer: COMMERCIAL

## 2023-11-27 DIAGNOSIS — G35 MULTIPLE SCLEROSIS (MULTI): Primary | ICD-10-CM

## 2023-11-27 RX ORDER — TIZANIDINE 2 MG/1
2 TABLET ORAL NIGHTLY
Qty: 30 TABLET | Refills: 0 | Status: SHIPPED | OUTPATIENT
Start: 2023-11-27 | End: 2024-03-01 | Stop reason: ALTCHOICE

## 2023-11-27 NOTE — TELEPHONE ENCOUNTER
Shu Dimas called. She was taking baclofen 10mg ( half of a tab at HS) she has had disrupted sleep. Daytime drowsiness, and not feeling well in general. She would like to discuss other options for spasms.

## 2023-11-27 NOTE — PROGRESS NOTES
Spoke with patient and she states she can not tolerate the Baclofen 5 mg at bedtime, she wakes up drossy, and she believes it is causing her to have headaches at night.  Spoke with Dr. Noonan and he recommends Tizanidine 2 mg at HS. Sent new script to patient's local pharmacy.

## 2023-11-28 ENCOUNTER — APPOINTMENT (OUTPATIENT)
Dept: PHYSICAL THERAPY | Facility: CLINIC | Age: 53
End: 2023-11-28
Payer: COMMERCIAL

## 2023-12-01 ENCOUNTER — TREATMENT (OUTPATIENT)
Dept: PHYSICAL THERAPY | Facility: CLINIC | Age: 53
End: 2023-12-01
Payer: COMMERCIAL

## 2023-12-01 DIAGNOSIS — G35 MULTIPLE SCLEROSIS (MULTI): ICD-10-CM

## 2023-12-01 DIAGNOSIS — R29.898 RIGHT LEG WEAKNESS: ICD-10-CM

## 2023-12-01 DIAGNOSIS — R26.9 ABNORMAL GAIT: Primary | ICD-10-CM

## 2023-12-01 PROCEDURE — 97112 NEUROMUSCULAR REEDUCATION: CPT | Mod: GP | Performed by: PHYSICAL THERAPIST

## 2023-12-01 PROCEDURE — 97750 PHYSICAL PERFORMANCE TEST: CPT | Mod: GP | Performed by: PHYSICAL THERAPIST

## 2023-12-01 NOTE — PROGRESS NOTES
Physical Therapy    Physical Therapy Treatment and Discharge    Patient Name: Shu Dimas  MRN: [id number]  Today's Date: 12/1/2023    Visit # 17    Assessment:  Completed tests and measures / Re-assessment for discharge today. See objective section for details. Patient is in agreement with discharge from PT. She plans to return to outpatient PT 2 weeks after she receives her first botox injections in February 2024.     Plan:  Continue Current plan of care     Current Problem  1. Abnormal gait  Follow Up In Physical Therapy      2. Right leg weakness  Follow Up In Physical Therapy      3. Multiple sclerosis (CMS/HCC)  Follow Up In Physical Therapy          Subjective   General  She started Tizanidine 2 days ago. Stopped Baclofen due to side effects. She is not noticing side effects from the Tizanidine yet.      Precautions  Moderate fall risk  Pain  None stated    Treatments:  Physical Tests and Measures:  10 meter walk (self selected) = 17.1 secs with rollator  10 meter walk (fast) = 15.7 secs with rollator  TUG (self selected) = 20.6 secs with rollator  Tinetti balance = 13/16  Tinetti gait = 6/12  Tinetti total = 19/28  Right ankle dorsiflexion PROM: knee extended = 2 deg, knee flexion = 8 deg. Left ankle DF with knee extended = 16 deg.   RLE strength: knee extension 5/5, knee flexion 2/5, ankle DF 2/5, PF 4+/5, inversion 3/5, eversion 3/5, hip flexion 2/5, hip abduction 3-/5, hip extension 4/5.    Neuromuscular Re-education (10764):  Recumbent bike LEs only 3 min forward / 3 min backward  Standing gastroc stretch. X 3 mins each  Sidestepping in // bars with UE support  Left foot step throughs working on right stance phase to improve stance stability, stance time, and dynamic small range knee flexion while preventing genu recurvatum.         Goals:  Balance: Patient will demonstrate 16/16 on Tinetti Balance test as evidence of improved postural control and balance during transitions and standing activities in  the home. (NOT MET, modest progress)  Flexibility: Patient will demonstrate at least 10 degrees of DF in sitting position as evidence of improving flexibility and allowing improved gait mechanics for improved safety during gait. (NOT MET, slight progress made)  Gait/Locomotion: Patient will demonstrate 9/12 on Tinetti gait with rolling walker as evidence of improved safety and independence during gait with rolling walker in home and community.   Motor Function/Control/Learning: Patient will demonstrate 22/34 on R LE Fugl-Rowe as evidence of improved motor control during functional mobility. (NOT MET)  Strength: Patient will demonstrate independence and compliance with HEP as evidence of patient's investment and engagement in progressing her safety and independence during functional mobility with rollator. (Goal MET)  Transfers: Patient will demonstrate TUG time of 14 seconds with rollator as evidence of improved safety and balance during functional mobility. (NOT MET, lack of progress)    Active       PT Problem       We are continuing the PT plan of care established in the legacy EMR. Refer to POC and goals in EMR and daily note for details.  (Progressing)       Start:  10/12/23

## 2023-12-07 ENCOUNTER — TELEPHONE (OUTPATIENT)
Dept: NEUROLOGY | Facility: CLINIC | Age: 53
End: 2023-12-07
Payer: COMMERCIAL

## 2023-12-07 NOTE — TELEPHONE ENCOUNTER
Tizinadine 2mg is causing her to be jittery during the day. She is only taking 2mg at HS. Please call with any other suggestions. Baclofen last week didn't work either.[phone] (home) [phone] (work)

## 2023-12-11 ENCOUNTER — INFUSION (OUTPATIENT)
Dept: INFUSION THERAPY | Facility: CLINIC | Age: 53
End: 2023-12-11
Payer: COMMERCIAL

## 2023-12-11 VITALS
BODY MASS INDEX: 36.15 KG/M2 | OXYGEN SATURATION: 94 % | DIASTOLIC BLOOD PRESSURE: 71 MMHG | RESPIRATION RATE: 16 BRPM | WEIGHT: 223.99 LBS | HEART RATE: 82 BPM | TEMPERATURE: 97 F | SYSTOLIC BLOOD PRESSURE: 103 MMHG

## 2023-12-11 DIAGNOSIS — D84.821 DRUG-INDUCED IMMUNODEFICIENCY (MULTI): Primary | ICD-10-CM

## 2023-12-11 DIAGNOSIS — Z79.899 DRUG-INDUCED IMMUNODEFICIENCY (MULTI): Primary | ICD-10-CM

## 2023-12-11 DIAGNOSIS — R11.0 NAUSEA: Primary | ICD-10-CM

## 2023-12-11 DIAGNOSIS — G35 MULTIPLE SCLEROSIS (MULTI): ICD-10-CM

## 2023-12-11 LAB
BASOPHILS # BLD AUTO: 0.05 X10*3/UL (ref 0–0.1)
BASOPHILS NFR BLD AUTO: 0.8 %
EOSINOPHIL # BLD AUTO: 0.18 X10*3/UL (ref 0–0.7)
EOSINOPHIL NFR BLD AUTO: 2.8 %
ERYTHROCYTE [DISTWIDTH] IN BLOOD BY AUTOMATED COUNT: 12.5 % (ref 11.5–14.5)
HCT VFR BLD AUTO: 43.8 % (ref 36–46)
HGB BLD-MCNC: 15 G/DL (ref 12–16)
IGA SERPL-MCNC: 67 MG/DL (ref 70–400)
IGG SERPL-MCNC: 515 MG/DL (ref 700–1600)
IGM SERPL-MCNC: 26 MG/DL (ref 40–230)
IMM GRANULOCYTES # BLD AUTO: 0.01 X10*3/UL (ref 0–0.7)
IMM GRANULOCYTES NFR BLD AUTO: 0.2 % (ref 0–0.9)
LYMPHOCYTES # BLD AUTO: 1.38 X10*3/UL (ref 1.2–4.8)
LYMPHOCYTES NFR BLD AUTO: 21.8 %
MCH RBC QN AUTO: 30.7 PG (ref 26–34)
MCHC RBC AUTO-ENTMCNC: 34.2 G/DL (ref 32–36)
MCV RBC AUTO: 90 FL (ref 80–100)
MONOCYTES # BLD AUTO: 0.42 X10*3/UL (ref 0.1–1)
MONOCYTES NFR BLD AUTO: 6.6 %
NEUTROPHILS # BLD AUTO: 4.3 X10*3/UL (ref 1.2–7.7)
NEUTROPHILS NFR BLD AUTO: 67.8 %
NRBC BLD-RTO: 0 /100 WBCS (ref 0–0)
PLATELET # BLD AUTO: 229 X10*3/UL (ref 150–450)
RBC # BLD AUTO: 4.88 X10*6/UL (ref 4–5.2)
WBC # BLD AUTO: 6.3 X10*3/UL (ref 4.4–11.3)

## 2023-12-11 PROCEDURE — 88184 FLOWCYTOMETRY/ TC 1 MARKER: CPT

## 2023-12-11 PROCEDURE — 96413 CHEMO IV INFUSION 1 HR: CPT | Performed by: NURSE PRACTITIONER

## 2023-12-11 PROCEDURE — RXMED WILLOW AMBULATORY MEDICATION CHARGE

## 2023-12-11 PROCEDURE — 85025 COMPLETE CBC W/AUTO DIFF WBC: CPT

## 2023-12-11 PROCEDURE — 96415 CHEMO IV INFUSION ADDL HR: CPT | Performed by: NURSE PRACTITIONER

## 2023-12-11 PROCEDURE — 96375 TX/PRO/DX INJ NEW DRUG ADDON: CPT | Performed by: NURSE PRACTITIONER

## 2023-12-11 PROCEDURE — 88187 FLOWCYTOMETRY/READ 2-8: CPT | Performed by: PSYCHIATRY & NEUROLOGY

## 2023-12-11 PROCEDURE — 88185 FLOWCYTOMETRY/TC ADD-ON: CPT

## 2023-12-11 PROCEDURE — 82784 ASSAY IGA/IGD/IGG/IGM EACH: CPT

## 2023-12-11 RX ORDER — HEPARIN 100 UNIT/ML
500 SYRINGE INTRAVENOUS AS NEEDED
OUTPATIENT
Start: 2023-12-11

## 2023-12-11 RX ORDER — DIPHENHYDRAMINE HYDROCHLORIDE 50 MG/ML
50 INJECTION INTRAMUSCULAR; INTRAVENOUS AS NEEDED
OUTPATIENT
Start: 2023-12-11

## 2023-12-11 RX ORDER — DIPHENHYDRAMINE HYDROCHLORIDE 50 MG/ML
50 INJECTION INTRAMUSCULAR; INTRAVENOUS ONCE
Status: COMPLETED | OUTPATIENT
Start: 2023-12-11 | End: 2023-12-11

## 2023-12-11 RX ORDER — EPINEPHRINE 0.3 MG/.3ML
0.3 INJECTION SUBCUTANEOUS EVERY 5 MIN PRN
OUTPATIENT
Start: 2023-12-11

## 2023-12-11 RX ORDER — HEPARIN 100 UNIT/ML
500 SYRINGE INTRAVENOUS AS NEEDED
Status: DISCONTINUED | OUTPATIENT
Start: 2023-12-11 | End: 2023-12-11 | Stop reason: HOSPADM

## 2023-12-11 RX ORDER — ACETAMINOPHEN 325 MG/1
650 TABLET ORAL ONCE
Status: CANCELLED | OUTPATIENT
Start: 2023-12-11

## 2023-12-11 RX ORDER — ACETAMINOPHEN 325 MG/1
650 TABLET ORAL ONCE
Status: COMPLETED | OUTPATIENT
Start: 2023-12-11 | End: 2023-12-11

## 2023-12-11 RX ORDER — ONDANSETRON 4 MG/1
4 TABLET, FILM COATED ORAL EVERY 8 HOURS PRN
Qty: 20 TABLET | Refills: 0 | Status: SHIPPED | OUTPATIENT
Start: 2023-12-11 | End: 2023-12-18

## 2023-12-11 RX ORDER — DIPHENHYDRAMINE HYDROCHLORIDE 50 MG/ML
50 INJECTION INTRAMUSCULAR; INTRAVENOUS ONCE
Status: CANCELLED | OUTPATIENT
Start: 2023-12-11

## 2023-12-11 RX ORDER — FAMOTIDINE 10 MG/ML
20 INJECTION INTRAVENOUS ONCE AS NEEDED
OUTPATIENT
Start: 2023-12-11

## 2023-12-11 RX ORDER — ONDANSETRON HYDROCHLORIDE 2 MG/ML
4 INJECTION, SOLUTION INTRAVENOUS ONCE
Status: COMPLETED | OUTPATIENT
Start: 2023-12-11 | End: 2023-12-11

## 2023-12-11 RX ORDER — BISMUTH SUBSALICYLATE 262 MG
1 TABLET,CHEWABLE ORAL DAILY
COMMUNITY

## 2023-12-11 RX ORDER — ALBUTEROL SULFATE 0.83 MG/ML
3 SOLUTION RESPIRATORY (INHALATION) AS NEEDED
OUTPATIENT
Start: 2023-12-11

## 2023-12-11 RX ORDER — ONDANSETRON HYDROCHLORIDE 2 MG/ML
4 INJECTION, SOLUTION INTRAVENOUS ONCE
Start: 2024-06-11 | End: 2024-06-11

## 2023-12-11 RX ADMIN — ONDANSETRON HYDROCHLORIDE 4 MG: 2 INJECTION, SOLUTION INTRAVENOUS at 10:00

## 2023-12-11 RX ADMIN — DIPHENHYDRAMINE HYDROCHLORIDE 50 MG: 50 INJECTION INTRAMUSCULAR; INTRAVENOUS at 09:08

## 2023-12-11 RX ADMIN — HEPARIN 500 UNITS: 100 SYRINGE at 14:55

## 2023-12-11 RX ADMIN — ACETAMINOPHEN 650 MG: 325 TABLET ORAL at 09:00

## 2023-12-11 ASSESSMENT — ENCOUNTER SYMPTOMS
WOUND: 0
FREQUENCY: 0
DIARRHEA: 0
HEMATURIA: 0
DYSURIA: 0
NERVOUS/ANXIOUS: 0
TROUBLE SWALLOWING: 0
PALPITATIONS: 0
VOICE CHANGE: 0
NUMBNESS: 0
DIZZINESS: 0
APPETITE CHANGE: 0
BLOOD IN STOOL: 0
MYALGIAS: 0
EYE PROBLEMS: 0
UNEXPECTED WEIGHT CHANGE: 0
COUGH: 0
NAUSEA: 0
CONSTIPATION: 0
ABDOMINAL PAIN: 0
SORE THROAT: 0
HEADACHES: 0
EXTREMITY WEAKNESS: 1
ARTHRALGIAS: 0
LEG SWELLING: 0
DEPRESSION: 0
FATIGUE: 0
VOMITING: 0
SHORTNESS OF BREATH: 0
WHEEZING: 0
CHILLS: 0
LIGHT-HEADEDNESS: 0
FEVER: 0

## 2023-12-11 NOTE — TELEPHONE ENCOUNTER
Ms. Dimas called the office having some ongoing nausea after her Ocrevus infusion earlier today. Will send a prescription for ondansetron to her local pharmacy.    Bogdan Whitney, PharmD, BCPS, [facility] [place]  Clinical Pharmacy Specialist- Neurology/[facility]  Phone: [phone]  Fax: [phone]

## 2023-12-11 NOTE — PROGRESS NOTES
[facility]   infusion Clinic Note   Date: 2023   Name: Shu Dimas  : 1970   MRN: [id number]         Reason for Visit: Follow-up and OP Infusion (PATIENT HERE FOR OCREVUS 600 MG EVERY 6 MONTHS.)         Visit Type: INFUSION      Ordered By: DR. SMITH      Accompanied by:      Diagnosis: Drug-induced immunodeficiency (CMS/HCC)    Multiple sclerosis (CMS/HCC)       Allergies:   Allergies as of 2023    (No Known Allergies)         Current Medications has a current medication list which includes the following prescription(s): bisoprolol-hydrochlorothiazide, bisoprolol-hydrochlorothiazide, cholecalciferol, cholecalciferol, dalfampridine, gabapentin, mirabegron, ocrelizumab, omeprazole, polyethylene glycol, amitriptyline, celecoxib, clotrimazole-betamethasone, diphenhydramine, famotidine, ferrous sulfate (325 mg ferrous sulfate), fluconazole, furosemide, guaifenesin, levofloxacin, meloxicam, mometasone, multivit-min/ferrous fumarate, multivitamin, olanzapine, omeprazole, ondansetron, ondansetron, oxycodone-acetaminophen, polymyxin b sulf-trimethoprim, potassium chloride cr, sodium chloride, solifenacin, tizanidine, venlafaxine xr, and vitamin b-12, and the following Facility-Administered Medications: heparin flush.       Vitals:  Vitals:    23 1205 23 1306 23 1400 23 1453   BP: 106/73 92/56 97/61 103/71   Pulse: 69 76 79 82   Resp: 16 16 16 16   Temp: 36.3 °C (97.4 °F) 35.8 °C (96.4 °F) 36.1 °C (97 °F) 36.1 °C (97 °F)   SpO2: 95% 94% 94% 94%   Weight:                 Infusion Pre-procedure Checklist:   - Allergies reviewed: yes   - Medications reviewed: yes       - Previous reaction to current treatment: yes NAUSEA      Assess patient for the concerns below. Document provider notification as appropriate.  - Active or recent infection with/without current antibiotic use: no  - Recent or planned invasive dental work: yes REPAIR OF CHIPPED  TOOTH. WILL HAVE APPOINTMENT IN 2 WEEKS.  - Recent or planned surgeries: no  - Recently received or plans to receive vaccinations: yes PLANS TO GET FLU VACCINE IN 2 WEEKS  - Has treatment related toxicities: yes NAUSEA DURING INFUSION AND SOMETIMES LAST 2-3 DAYS  - Is pregnant:  no      Pain: 0   - Is the pain different from normal: n/a   - Is the pain tolerable: n/a   - Is your Doctor aware:  n/a      Labs: Labs drawn and sent per order         Fall Risk Screening: Mena Fall Risk  History of Falling, Immediate or Within 3 Months: Yes (ONE TIME FELL OFF CUDRB WITH WALKER)  Secondary Diagnosis: Yes  Ambulatory Aid: Crutches/cane/walker  Intravenous Therapy/Heparin Lock: Yes  Gait/Transferring: Weak (RIGHT LEG IS WEEK (SIDE AFFECTED BY MS))  Mental Status: Oriented to own ability  Mena Fall Risk Score: 85       Review Of Systems:  Review of Systems   Constitutional:  Negative for appetite change, chills, fatigue, fever and unexpected weight change.   HENT:   Negative for hearing loss, mouth sores, sore throat, tinnitus, trouble swallowing and voice change.    Eyes:  Negative for eye problems.   Respiratory:  Negative for cough, shortness of breath and wheezing.    Cardiovascular:  Negative for chest pain, leg swelling and palpitations.   Gastrointestinal:  Negative for abdominal pain, blood in stool, constipation, diarrhea, nausea and vomiting.   Genitourinary:  Negative for dysuria, frequency and hematuria.    Musculoskeletal:  Negative for arthralgias and myalgias.   Skin:  Negative for itching, rash and wound.   Neurological:  Positive for extremity weakness. Negative for dizziness, headaches, light-headedness and numbness.        D/T DIAGNOSIS; MANAGED ON MEDICATION   Psychiatric/Behavioral:  Negative for depression. The patient is not nervous/anxious.          Infusion Readiness:   - Assessment Concerns Related to Infusion: No  - Provider notified: n/a      Document Below Only If Indicated:   New Patient  Education:    N/A (returning patient for continuation of therapy. Ongoing education provided as needed.)        Treatment Conditions & Drug Specific Questions:    Ocrelizumab  (OCREVUS)    (Unless otherwise specified on patient specific therapy plan):     TREATMENT CONDITIONS:  Unless otherwise specified on patient specific therapy plan HOLD and notify provider prior to proceeding with today's infusion if patient with:  o Positive Hepatitis B Surface Ag or panel  o Positive T-Spot    Lab Results   Component Value Date    TBSIN Negative 06/29/2020      Labs reviewed and patient meets treatment conditions? Yes    DRUG SPECIFIC QUESTIONS:  Any signs or symptoms of Progressive multifocal leukoencephalopathy (PML) which may include: memory loss, trouble thinking, dizziness, loss of balance, difficulty talking / walking or loss of vision? No    (If YES HOLD and notify provider)      REMINDER:  Recommended Vitals/Observation:  Vitals: Obtain vital signs every 30 minutes / with each ramp up. Once maximum rate is reached obtain vitals hourly until infusion is complete. Obtain vitals at end of observation period and PRN.  Observation: Observe patient for 60 minutes after each infusion.        Weight Based Drug Calculations:    WEIGHT BASED DRUGS: NOT APPLICABLE / FLAT DOSE          Initiated By: Olga Lidia Whitaker RN   Time: 4:42 PM     We administered acetaminophen, methylPREDNISolone sod succinate, diphenhydrAMINE, ocrelizumab (Ocrevus) 600 mg in sodium chloride 0.9% 520 mL IV, ondansetron, and heparin flush.

## 2023-12-11 NOTE — PATIENT INSTRUCTIONS
Today :We administered acetaminophen, methylPREDNISolone sod succinate, diphenhydrAMINE, ocrelizumab (Ocrevus) 600 mg in sodium chloride 0.9% 520 mL IV, and ondansetron. ODANSETRON (ZOFRAN) 4 MG ALSO GIVEN IV PRIOR TO INFUSION FOR NAUSEA.    For:   1. Drug-induced immunodeficiency (CMS/HCC)    2. Multiple sclerosis (CMS/HCC)         Your next appointment is due in:  6 MONTHS     Please read the  Medication Guide that was given to you and reviewed during todays visit.     (Tell all doctors including dentists that you are taking this medication)     Go to the emergency room or call 911 if:  -You have signs of allergic reaction:   -Rash, hives, itching.   -Swollen, blistered, peeling skin.   -Swelling of face, lips, mouth, tongue or throat.   -Tightness of chest, trouble breathing, swallowing or talking     Call your doctor:  - If IV / injection site gets red, warm, swollen, itchy or leaks fluid or pus.     (Leave dressing on your IV site for at least 2 hours and keep area clean and dry  - If you get sick or have symptoms of infection or are not feeling well for any reason.    (Wash your hands often, stay away from people who are sick)  - If you have side effects from your medication that do not go away or are bothersome.     (Refer to the teaching your nurse gave you for side effects to call your doctor about)    - Common side effects may include:  stuffy nose, headache, feeling tired, muscle aches, upset stomach  - Before receiving any vaccines     - Call the [facility] at   If:  - You get sick, are on antibiotics, have had a recent vaccine, have surgery or dental work and your doctor wants your visit rescheduled.  - You need to cancel and reschedule your visit for any reason. Call at least 2 days before your visit if you need to cancel.   - Your insurance changes before your next visit.    (We will need to get approval from your new insurance. This can take up to two weeks.)     [facility] is opened Monday thru Friday. We are closed on weekends and holidays.   Voice mail will take your call if the center is closed. If you leave a message please allow 24 hours for a call back during weekdays. If you leave a message on a weekend/holiday, we will call you back the next business day.

## 2023-12-12 LAB
CD19 CELLS # BLD: 0 X10E9/L
CD19 CELLS NFR BLD: 0.1 %
FLOW CYTOMETRY SPECIALIST REVIEW: ABNORMAL
LYMPHOCYTES # SPEC AUTO: 1.38 X10*3/UL
PATH REVIEW, B CELL PHENOTYPING, EXTENDED: ABNORMAL

## 2023-12-13 ENCOUNTER — SPECIALTY PHARMACY (OUTPATIENT)
Dept: PHARMACY | Facility: CLINIC | Age: 53
End: 2023-12-13

## 2023-12-14 ENCOUNTER — PHARMACY VISIT (OUTPATIENT)
Dept: PHARMACY | Facility: CLINIC | Age: 53
End: 2023-12-14
Payer: COMMERCIAL

## 2024-01-08 ENCOUNTER — SPECIALTY PHARMACY (OUTPATIENT)
Dept: PHARMACY | Facility: CLINIC | Age: 54
End: 2024-01-08

## 2024-01-08 PROCEDURE — RXMED WILLOW AMBULATORY MEDICATION CHARGE

## 2024-01-10 ENCOUNTER — PHARMACY VISIT (OUTPATIENT)
Dept: PHARMACY | Facility: CLINIC | Age: 54
End: 2024-01-10
Payer: COMMERCIAL

## 2024-01-25 ENCOUNTER — PROCEDURE VISIT (OUTPATIENT)
Dept: NEUROLOGY | Facility: CLINIC | Age: 54
End: 2024-01-25
Payer: COMMERCIAL

## 2024-01-25 VITALS — BODY MASS INDEX: 36.64 KG/M2 | WEIGHT: 227 LBS

## 2024-01-25 DIAGNOSIS — R25.2 SPASTICITY: Primary | ICD-10-CM

## 2024-01-25 DIAGNOSIS — M62.838 MUSCLE SPASM: ICD-10-CM

## 2024-01-25 PROCEDURE — 99214 OFFICE O/P EST MOD 30 MIN: CPT | Performed by: PSYCHIATRY & NEUROLOGY

## 2024-01-25 PROCEDURE — 95874 GUIDE NERV DESTR NEEDLE EMG: CPT | Performed by: PSYCHIATRY & NEUROLOGY

## 2024-01-25 PROCEDURE — 64642 CHEMODENERV 1 EXTREMITY 1-4: CPT | Performed by: PSYCHIATRY & NEUROLOGY

## 2024-01-25 NOTE — PATIENT INSTRUCTIONS
YOU WERE INJECTED WITH 100 UNIT OF BOTOX.  It usually kicks in after 5 days and wears off after 3 months.  Please call us back in 10 days and let us know how you are doing.

## 2024-01-25 NOTE — PROGRESS NOTES
Subjective         HPI      She is a 54 yo right handed female patient with hx of MS, breast cancer s/p lumpectomy/radiation/chemotherapy, and HTN who is referred by Dr. Calloway for advanced spasticity evaluation. She is seen along with her . Will share the note with the referring physician via EMR       Interval HX:Did not tolerate baclofen.developed headache and difficulty in falling sleep      Cause of spasticity: MS  Cerebral spasticity: possible  Spinal spasticity: yes  Mobility: walker  Transfers: on her own   Falls: yes twice a year  AFO:  could not walk with it. She has a heel lift on the left.      Spasticity interferes with residual function: yes spasticity affects walking.   Spasticity interferes with ROM and posture: ankle feels stiff but no abnormal posture  Spasticity causes pain and/or discomfort:  infrequent isometric spasms in the right calf, twice a year or so, painful, relieved by movement.   Spasticity interferes with hygiene or skin care: no   UTIs and last urine check: yes but no effect on spasticity.   temperature effect: very cold weather causes her whole body to be tight     Tonic spasms:   Flexor: no   Extensor: no   Adductor:  no   Isometric (cramps):   infrequent isometric spasms in the right calf, twice a year or so, painful, relieved by movement.   Complex (more than 1 at a time): no   Painful:  yes see above   Focal dystonia:   no   Myoclonus:  no  Spontaneous clonus: no     Tremor:  no   RLS:  no      Treatment:  Baclofen: took it in the past but stopped it because it wasn't helping. Does not recall the dose. could not tolerate a retry.   Tizanidine:  not taking   Dantrolene: not taking  Gabapentin: took it in the past for shingles   pregabalin: no   Tegretol:  no   Trileptal:  no   Anticholinergics: no    Dopamine agonist: no   Botulinum toxin: here to discuss.   Baclofen pump: no  Other treatments: no      PT: doing currently, helpful   OT: did in the past, helpful    Stretching: yes daily   Exercise: no but used to do stationary bike in the past.      Goals of spasticity management:   Function: improve walking and gait.   ROM/posture:  no goals  Comfort:  no goals  Hygiene and skin care:  no goals.                    Review of Systems  All other system have been reviewed and are negative for complaint.  Objective   Neurological Exam    Physical Exam                    Spasticity exam:     Modified Doris Scale (MAS)  Elbow Extension Score: Slight increase at the end of the range of motion  Elbow Flexion Score: No increase  Wrist Extension Score: No increase  Finger Extension Score: No increase  Thumb ABduction Score: No increase  Hamstring Extension Score: Slight increase, minimal resistance throughout the remainder (less than half) of the range of motion  Quadriceps Extension Score: Slight increase, minimal resistance throughout the remainder (less than half) of the range of motion  Ankle Dorsiflexion-Gastrocnemius Score: Considerable increase  Ankle Dorsiflexion-Soleus Score: Considerable increase  Modified Doris Scale Comments: on the right        [place] Spasm Frequency Scale:        Score = 1     ----------------------------  Extensor spasms: no   Flexor spasms: no   Adductor spasms: no  Isometric spasms: not on exam  Complex spasms: no  Abnormal fixed posture or fixed dystonia: no  Paroxysmal focal dystonia: no  Inducible clonus: two beats of unsustained ankle clonus on the left  Spontaneous clonus: no   Myoclonus: no  Tremor: no  RLS/akathisia: no           Procedure:  Lot:[id number]  Exp:06/2026              Assessment/Plan     This is a 45 yo originally right handed female with hx of MS, breast cancer s/p lumpectomy/radiation/chemotherapy, and HTN who is referred for advanced spasticity management. Main concern is improving gait and right ankle stiffness. Exam positive for RLE weakness with moderate spasticity of the right hemibody most pronounced around the right  ankle. No tonic spasms, myoclonus, or tremor on exam. oral baclofen has been ineffective in the past but she cannot recall the maximum dose she tried. Patient can be a good candidate for repeat oral baclofen trial and targeted botulinum toxin injection to the following muscles on the right: med GC 25 units, lat GC 25 units, and soleus 50 units. The doses can be increased over time as tolerated. ITB is not indicated given focal spasticity and lack of sufficient oral and injectable trials. she did not have that much signal on injection.     1/25/2024: could not tolerate baclofen and stopped it. First injection session tolerated well. Low signal on EMG. May consider tizanidine in the future.        PLAN:  You were injected with a total of 100 units of botox. You tolerated the procedure well. The effect of botox usually kicks in after 3 to 5 days and lasts for three months. Please call office after about a week to report how you are doing. Follow up after three months for repeat injections.     Will order physical and occupational therapy.     Please continue daily stretching.          The impression and plan were discussed with the patient and family (if present) in details and all questions answered.     Medical decision making was moderate complexity.     Bobby Noonan MD

## 2024-02-01 ENCOUNTER — TELEPHONE (OUTPATIENT)
Dept: NEUROLOGY | Facility: HOSPITAL | Age: 54
End: 2024-02-01

## 2024-02-01 NOTE — TELEPHONE ENCOUNTER
Shu Dimas called.  She stated her first botox injection went well. She did notice a little bit of relief. She feels there is room for improvement since she was only noticing a little ease with lifting her limb.

## 2024-02-07 ENCOUNTER — SPECIALTY PHARMACY (OUTPATIENT)
Dept: PHARMACY | Facility: CLINIC | Age: 54
End: 2024-02-07

## 2024-02-07 ENCOUNTER — PHARMACY VISIT (OUTPATIENT)
Dept: PHARMACY | Facility: CLINIC | Age: 54
End: 2024-02-07
Payer: COMMERCIAL

## 2024-02-07 PROCEDURE — RXMED WILLOW AMBULATORY MEDICATION CHARGE

## 2024-02-13 ENCOUNTER — EVALUATION (OUTPATIENT)
Dept: PHYSICAL THERAPY | Facility: CLINIC | Age: 54
End: 2024-02-13
Payer: COMMERCIAL

## 2024-02-13 DIAGNOSIS — G35 MULTIPLE SCLEROSIS (MULTI): ICD-10-CM

## 2024-02-13 DIAGNOSIS — R26.9 ABNORMAL GAIT: Primary | ICD-10-CM

## 2024-02-13 DIAGNOSIS — R29.898 RIGHT LEG WEAKNESS: ICD-10-CM

## 2024-02-13 PROCEDURE — 97110 THERAPEUTIC EXERCISES: CPT | Mod: GP | Performed by: PHYSICAL THERAPIST

## 2024-02-13 PROCEDURE — 97162 PT EVAL MOD COMPLEX 30 MIN: CPT | Mod: GP | Performed by: PHYSICAL THERAPIST

## 2024-02-13 ASSESSMENT — ENCOUNTER SYMPTOMS
OCCASIONAL FEELINGS OF UNSTEADINESS: 1
DEPRESSION: 0
LOSS OF SENSATION IN FEET: 0

## 2024-02-13 NOTE — PROGRESS NOTES
Physical Therapy    Physical Therapy Evaluation and Treatment      Patient Name: Shu Dimas  MRN: [id number]  Today's Date: 2/13/2024  Time Calculation  Start Time: 0815  Stop Time: 0910  Time Calculation (min): 55 min    Assessment:  Patient presents with very stable MS and right sided weakness after receiving botox to right gastroc and soleus on 1/25/24. Her gait, functional mobility, strength, and ankle ROM are unchanged from last assessment on 12/1/23.  We will carry out a more aggressive stretching program for gastroc soleus over the next 6-8 weeks to take advantage of the active botox and determine if we can improve ankle ROM or foot drop/gait. She will follow up every other week to give herself time to create changes via home stretching.     Plan:  OP PT Plan  Treatment/Interventions: Education/ Instruction, Manual therapy, Gait training, Neuromuscular re-education, Therapeutic exercises, Therapeutic activities, Orthosis fit/ training  PT Plan: Skilled PT  PT Frequency: 1 time per week (Every other week)  Duration: up to 6 visits  Onset Date: 01/25/24  Rehab Potential: Fair  Plan of Care Agreement: Patient    Current Problem:   1. Abnormal gait  Follow Up In Physical Therapy      2. Multiple sclerosis (CMS/HCC)  Follow Up In Physical Therapy      3. Right leg weakness  Follow Up In Physical Therapy          Subjective    General:  General  Reason for Referral: MS, spasticity  Referred By: Dr. Noonan  Preferred Learning Style: verbal, kinesthetic  Pt. Received botox to right medial and lateral gastroc as well as soleus on 1/25/24 by Dr. Noonan. Patient isn't sure whether it has helped. She thinks that she can lift her right toes a little more but not definitively.   Precautions:  Precautions  STEADI Fall Risk Score (The score of 4 or more indicates an increased risk of falling): 6  Precautions Comment: moderate fall risk  Vital Signs:     Pain:     Home Living:  Lives with  and son.   Prior Level of  Function:  Chronic right sided MS weakness. Ambulates with rollator at all times. Once fall in the last year.     Objective   Cognition:  Grossly intact  General Assessments:  She reports no interval physical, visual, cognitive changes since last round of PT that ended in December 2023.   Functional Assessments:  Ambulates with rollator: decreased hip flexion, knee flexion and lack of active dorsiflexion lead to toe drag at initial to mid swing on the right. Right toe catches about 70% of the time.   Extremity/Trunk Assessments:  Right ankle Dorsiflexion PROM: 2 degrees with knee extended, 10 degrees with knee flexed 90 deg.   Right ankle Dorsiflexion AROM: - 8 deg knee extended (no active movement), 2 deg with knee flexed  LLE strength is WNL  RLE strength: hip flexion 2/5, extension 4+/5, abduction 3-/5, adduction 3-/5, knee flexion 2/5, knee extension 5/5, ankle DF 2/5, ankle PF 4+/5  Outcome Measures:  10 meter walk = 18.6 secs with rollator  TUG = 19.74 secs with rollator    Treatments:  Established a gastroc and soleus stretching HEP. 4 minutes of total stretch time for each stretch. Performed gastroc stretch on incline and on step, soleus stretch on incline.   EDUCATION:  Discussed possible effect/benefit of botox. Reviewed eval findings and lack of change since last session in December.   Goals:  Active       MS, spasticity, right sided weakness       ankle ROM       Start:  02/13/24    Expected End:  05/13/24       Improve right ankle DF PROM to: >5 degrees with knee extended, > 12 degrees knee flexed  Right ankle DF AROM: knee extended > -5, knee flexion > 4 deg.          Gait       Start:  02/13/24    Expected End:  05/13/24       Improve right foot clearance during gait to demonstrate right toe drag < 50% of steps.          HEP       Start:  02/13/24    Expected End:  05/13/24       Pt. Will be independent with HEP to maximize benefits of botox and ankle mobility         Patient Stated Goal 1        Start:  02/13/24    Expected End:  05/13/24       To walk with less toe drag

## 2024-02-27 ENCOUNTER — TREATMENT (OUTPATIENT)
Dept: PHYSICAL THERAPY | Facility: CLINIC | Age: 54
End: 2024-02-27
Payer: COMMERCIAL

## 2024-02-27 DIAGNOSIS — G35 MULTIPLE SCLEROSIS (MULTI): ICD-10-CM

## 2024-02-27 DIAGNOSIS — R26.9 ABNORMAL GAIT: Primary | ICD-10-CM

## 2024-02-27 DIAGNOSIS — R29.898 RIGHT LEG WEAKNESS: ICD-10-CM

## 2024-02-27 DIAGNOSIS — R25.2 SPASTICITY: ICD-10-CM

## 2024-02-27 PROCEDURE — 97112 NEUROMUSCULAR REEDUCATION: CPT | Mod: GP | Performed by: PHYSICAL THERAPIST

## 2024-02-27 NOTE — PROGRESS NOTES
Physical Therapy    Physical Therapy Treatment    Patient Name: Shu Dimas  MRN: [id number]  Today's Date: 2/27/2024  Time Calculation  Start Time: 0730  Stop Time: 0815  Time Calculation (min): 45 min      PT Therapeutic Procedures Time Entry  Neuromuscular Re-Education Time Entry: 45          Assessment:  Improved right toe clearance during gait today. Right toe drag occurred with about 20% of steps today. She feels looser after stretching and states that she clears her foot better after stretching.   We discussed Bioness lower leg cuff as a potential option. We will discuss more and determine if she is a good candidate. Plan to trial e-stim to tib anterior next visit to gauge response.   Plan:  Continue skilled PT to manage RLE spasticity (primarily gastroc and soleus), improve gait quality, reduce fall risk, and maintain/improve strength of LE(s).     Current Problem  1. Abnormal gait  Follow Up In Physical Therapy      2. Multiple sclerosis (CMS/HCC)  Follow Up In Physical Therapy      3. Right leg weakness  Follow Up In Physical Therapy      4. Spasticity            General    Subjective    No reported change in feeling of tightness, spasticity, or gait since botox to right gastroc, soleus.   Precautions  Low fall risk; MS    Pain  No pain reported    Objective      Outcome Measures:  Not tested  Treatments:  Neuromuscular Re-education: 45 minutes  - recumbent bike 3 mins forward, 3 mins backward  - standing R gastroc stretch on step. 1 min x 3   - standing R soleus stretch with foot on 3 inch foam 1 min x 3  - Standing right hip flexion (driving knee up to t-ball in // bars). 2 x 20  - squats w/ feet on incline. 2 x 15. In // bars  - seated right dorsiflexion with knee flexed 60 deg. 4 x 5 reps  - Gait assessment: Ambulated 210 ft x 2 with rollator. Less right toe drag today than previous session. Right Toe drag about 25% of her steps today.     OP EDUCATION:       Goals:  Active       MS, spasticity, right  sided weakness       ankle ROM       Start:  02/13/24    Expected End:  05/13/24       Improve right ankle DF PROM to: >5 degrees with knee extended, > 12 degrees knee flexed  Right ankle DF AROM: knee extended > -5, knee flexion > 4 deg.          Gait       Start:  02/13/24    Expected End:  05/13/24       Improve right foot clearance during gait to demonstrate right toe drag < 50% of steps.          HEP       Start:  02/13/24    Expected End:  05/13/24       Pt. Will be independent with HEP to maximize benefits of botox and ankle mobility         Patient Stated Goal 1       Start:  02/13/24    Expected End:  05/13/24       To walk with less toe drag

## 2024-03-01 ENCOUNTER — OFFICE VISIT (OUTPATIENT)
Dept: UROLOGY | Facility: CLINIC | Age: 54
End: 2024-03-01
Payer: COMMERCIAL

## 2024-03-01 VITALS
SYSTOLIC BLOOD PRESSURE: 126 MMHG | HEART RATE: 80 BPM | WEIGHT: 227 LBS | TEMPERATURE: 96.9 F | BODY MASS INDEX: 36.48 KG/M2 | HEIGHT: 66 IN | DIASTOLIC BLOOD PRESSURE: 85 MMHG

## 2024-03-01 DIAGNOSIS — N30.00 ACUTE CYSTITIS WITHOUT HEMATURIA: ICD-10-CM

## 2024-03-01 DIAGNOSIS — N39.41 URGENCY INCONTINENCE: ICD-10-CM

## 2024-03-01 DIAGNOSIS — R39.15 URGENCY OF URINATION: Primary | ICD-10-CM

## 2024-03-01 LAB
POC APPEARANCE, URINE: ABNORMAL
POC BILIRUBIN, URINE: NEGATIVE
POC BLOOD, URINE: ABNORMAL
POC COLOR, URINE: YELLOW
POC GLUCOSE, URINE: NEGATIVE MG/DL
POC KETONES, URINE: NEGATIVE MG/DL
POC LEUKOCYTES, URINE: ABNORMAL
POC NITRITE,URINE: NEGATIVE
POC PH, URINE: 6 PH
POC PROTEIN, URINE: ABNORMAL MG/DL
POC SPECIFIC GRAVITY, URINE: 1.02
POC UROBILINOGEN, URINE: 0.2 EU/DL

## 2024-03-01 PROCEDURE — 99213 OFFICE O/P EST LOW 20 MIN: CPT | Performed by: NURSE PRACTITIONER

## 2024-03-01 PROCEDURE — 1036F TOBACCO NON-USER: CPT | Performed by: NURSE PRACTITIONER

## 2024-03-01 PROCEDURE — 3074F SYST BP LT 130 MM HG: CPT | Performed by: NURSE PRACTITIONER

## 2024-03-01 PROCEDURE — 81002 URINALYSIS NONAUTO W/O SCOPE: CPT | Performed by: NURSE PRACTITIONER

## 2024-03-01 PROCEDURE — 3079F DIAST BP 80-89 MM HG: CPT | Performed by: NURSE PRACTITIONER

## 2024-03-01 PROCEDURE — 51798 US URINE CAPACITY MEASURE: CPT | Performed by: NURSE PRACTITIONER

## 2024-03-01 RX ORDER — MIRABEGRON 50 MG/1
50 TABLET, EXTENDED RELEASE ORAL NIGHTLY
Qty: 90 TABLET | Refills: 3 | Status: SHIPPED | OUTPATIENT
Start: 2024-03-01 | End: 2025-03-01

## 2024-03-01 RX ORDER — NITROFURANTOIN 25; 75 MG/1; MG/1
100 CAPSULE ORAL 2 TIMES DAILY
Qty: 14 CAPSULE | Refills: 0 | Status: SHIPPED | OUTPATIENT
Start: 2024-03-01 | End: 2024-03-08

## 2024-03-01 NOTE — PATIENT INSTRUCTIONS
Plan:   Continue Myrbetriq 50 mg  UTI prevention, Dmanose 2000 mg daily if Ecoli UTI  Will see what culture shows  Treated empirically w Macrobid as weekend upon us  Follow up one year [facility]  Nurse line [phone]

## 2024-03-01 NOTE — PROGRESS NOTES
"03/01/24   [id number]    Chief Complaint   Patient presents with    Follow-up     FUV. OAB PVR check      Subjective      HPI Shu Dimas is a 54 y.o. female who presents for follow up hx MS with neurogenic bladder, urge incontinence d/t DO and DSD, voiding dysfunction; Last seen 8/21/23, at that time she was going through breast cancer and radiation;     Had been started on Myrbetriq by Dr. Humphries and was on combination Myrbetriq and Vesicare at one point but w elevated PVR backed off Vesicare, no UTIs;    Creatinine 0.54 GFR > 60 on 9/21/23    PVR 21 ml, UA small heme, large leuk, wondered if UTI as earlier in week more urgency and frequency;     Doing better overall w bladder, pad for security now;     Objective     /85 (BP Location: Right arm, Patient Position: Sitting)   Pulse 80   Temp 36.1 °C (96.9 °F) (Temporal)   Ht 1.676 m (5' 6\")   Wt 103 kg (227 lb)   BMI 36.64 kg/m²    Physical Exam  General: Appears comfortable and in no apparent distress, well nourished  Head: Normocephalic, atraumatic  Neck: trachea midline  Respiratory: respirations unlabored, no wheezes, and no use of accessory muscles  Cardiovascular: at rest no dyspnea, well perfused  Skin: no visible rashes or lesions  Neurologic: grossly intact, oriented to person, place, and time  Psychiatric: mood and affect appropriate  Musculoskeletal: in chair for appt. no difficulty w upper body movement, ambulating w rollator      Assessment/Plan   Problem List Items Addressed This Visit    None  Visit Diagnoses       Urgency of urination    -  Primary    Relevant Orders    Measure post void residual (Completed)    POCT UA (nonautomated) manually resulted (Completed)    Urgency incontinence        Relevant Orders    Urinalysis with Reflex Microscopic    Urine Culture    Acute cystitis without hematuria        Relevant Medications    nitrofurantoin, macrocrystal-monohydrate, (Macrobid) 100 mg capsule          Orders Placed This Encounter "   Procedures    Urine Culture     Standing Status:   Future     Standing Expiration Date:   3/8/2024     Order Specific Question:   Release result to MyChart     Answer:   Immediate [1]    Urinalysis with Reflex Microscopic     Order Specific Question:   Release result to MyChart     Answer:   Immediate [1]    Measure post void residual    POCT UA (nonautomated) manually resulted     Order Specific Question:   Release result to MyChart     Answer:   Immediate [1]         Plan:   Continue Myrbetriq 50 mg  UTI prevention, Dmanose 2000 mg daily if Ecoli UTI  Will see what culture shows  Treated empirically w Macrobid as weekend upon us  Follow up one year [facility]  Nurse line [phone]    Pat Atkinson, APRN-CNP  Lab Results   Component Value Date    GLUCOSE 81 12/02/2020    CALCIUM 9.5 12/02/2020     12/02/2020    K 3.6 12/02/2020    CO2 31 12/02/2020    CL 99 12/02/2020    BUN 7 12/02/2020    CREATININE 0.72 12/02/2020

## 2024-03-12 ENCOUNTER — TREATMENT (OUTPATIENT)
Dept: PHYSICAL THERAPY | Facility: CLINIC | Age: 54
End: 2024-03-12
Payer: COMMERCIAL

## 2024-03-12 DIAGNOSIS — R29.898 RIGHT LEG WEAKNESS: ICD-10-CM

## 2024-03-12 DIAGNOSIS — R26.9 ABNORMAL GAIT: Primary | ICD-10-CM

## 2024-03-12 DIAGNOSIS — G35 MULTIPLE SCLEROSIS (MULTI): ICD-10-CM

## 2024-03-12 PROCEDURE — 97112 NEUROMUSCULAR REEDUCATION: CPT | Mod: GP | Performed by: PHYSICAL THERAPIST

## 2024-03-12 PROCEDURE — 97032 APPL MODALITY 1+ESTIM EA 15: CPT | Mod: GP | Performed by: PHYSICAL THERAPIST

## 2024-03-12 NOTE — PROGRESS NOTES
Physical Therapy    Physical Therapy Treatment    Patient Name: Shu Dimas  MRN: [id number]  Today's Date: 3/12/2024  Time Calculation  Start Time: 0730  Stop Time: 0815  Time Calculation (min): 45 min      PT Therapeutic Procedures Time Entry  Neuromuscular Re-Education Time Entry: 33   PT Modalities Time Entry  E-Stim (Attended) Time Entry: 12      Assessment:  Improved right toe clearance after gastroc/soleus stretching. Right toe caught 15% of the time prior to stretching and <5% after stretching. Good dorsiflexion response to NMES to tibialis anterior at 37 mA. After 8 minutes the active motion during NMES reduced due to neuromuscular fatigue.   We discussed Bioness lower leg cuff as a potential option.    Plan:  Continue skilled PT to manage RLE spasticity (primarily gastroc and soleus), improve gait quality, reduce fall risk, and maintain/improve strength of LE(s).     Current Problem  1. Abnormal gait  Follow Up In Physical Therapy      2. Multiple sclerosis (CMS/HCC)  Follow Up In Physical Therapy      3. Right leg weakness  Follow Up In Physical Therapy            General    Subjective    No reported change in feeling of tightness, spasticity, or gait since botox to right gastroc, soleus. She thinks that she may be clearing her right foot better since botox.   Precautions  Low fall risk; MS    Pain  No pain reported    Objective      Outcome Measures:  Not tested  Treatments:  Neuromuscular Re-education: 33 minutes for spasticity management and neuromuscular control  - recumbent bike 3 mins forward, 3 mins backward  - standing R gastroc stretch on step. 1 min x 3   - standing R soleus stretch with foot on towel roll 1 min x 3  - right gastroc stretch on incline 2 mins x 2.  - Gait assessment: Ambulated 210 ft x 2 with rollator. Less right toe drag today than previous session. Right Toe drag about 15% of her steps today.     NMES to right tibialis anterior x 12 minutes with volitional activation during  stim on periods. Good DF AROM during stim.     Not performed today:  - Standing right hip flexion (driving knee up to t-ball in // bars). 2 x 20  - squats w/ feet on incline. 2 x 15. In // bars  - seated right dorsiflexion with knee flexed 60 deg. 4 x 5 reps  OP EDUCATION:       Goals:  Active       MS, spasticity, right sided weakness       ankle ROM       Start:  02/13/24    Expected End:  05/13/24       Improve right ankle DF PROM to: >5 degrees with knee extended, > 12 degrees knee flexed  Right ankle DF AROM: knee extended > -5, knee flexion > 4 deg.          Gait       Start:  02/13/24    Expected End:  05/13/24       Improve right foot clearance during gait to demonstrate right toe drag < 50% of steps.          HEP       Start:  02/13/24    Expected End:  05/13/24       Pt. Will be independent with HEP to maximize benefits of botox and ankle mobility         Patient Stated Goal 1       Start:  02/13/24    Expected End:  05/13/24       To walk with less toe drag

## 2024-03-18 ENCOUNTER — SPECIALTY PHARMACY (OUTPATIENT)
Dept: PHARMACY | Facility: CLINIC | Age: 54
End: 2024-03-18

## 2024-03-18 RX ORDER — DALFAMPRIDINE 10 MG/1
TABLET, FILM COATED, EXTENDED RELEASE ORAL
Qty: 60 TABLET | Refills: 5 | Status: CANCELLED | OUTPATIENT
Start: 2024-03-18 | End: 2025-03-18

## 2024-03-21 DIAGNOSIS — G35 MULTIPLE SCLEROSIS (MULTI): Primary | ICD-10-CM

## 2024-03-21 RX ORDER — DALFAMPRIDINE 10 MG/1
TABLET, FILM COATED, EXTENDED RELEASE ORAL
Qty: 60 TABLET | Refills: 5 | Status: SHIPPED | OUTPATIENT
Start: 2024-03-21 | End: 2025-03-21

## 2024-03-25 ENCOUNTER — SPECIALTY PHARMACY (OUTPATIENT)
Dept: PHARMACY | Facility: CLINIC | Age: 54
End: 2024-03-25

## 2024-03-25 PROCEDURE — RXMED WILLOW AMBULATORY MEDICATION CHARGE

## 2024-03-27 ENCOUNTER — SPECIALTY PHARMACY (OUTPATIENT)
Dept: PHARMACY | Facility: CLINIC | Age: 54
End: 2024-03-27

## 2024-03-28 ENCOUNTER — PHARMACY VISIT (OUTPATIENT)
Dept: PHARMACY | Facility: CLINIC | Age: 54
End: 2024-03-28
Payer: COMMERCIAL

## 2024-04-02 ENCOUNTER — TREATMENT (OUTPATIENT)
Dept: PHYSICAL THERAPY | Facility: CLINIC | Age: 54
End: 2024-04-02
Payer: COMMERCIAL

## 2024-04-02 DIAGNOSIS — G35 MULTIPLE SCLEROSIS (MULTI): ICD-10-CM

## 2024-04-02 DIAGNOSIS — R26.9 ABNORMAL GAIT: ICD-10-CM

## 2024-04-02 DIAGNOSIS — R29.898 RIGHT LEG WEAKNESS: ICD-10-CM

## 2024-04-02 PROCEDURE — 97112 NEUROMUSCULAR REEDUCATION: CPT | Mod: GP | Performed by: PHYSICAL THERAPIST

## 2024-04-02 PROCEDURE — 97530 THERAPEUTIC ACTIVITIES: CPT | Mod: GP | Performed by: PHYSICAL THERAPIST

## 2024-04-02 NOTE — PROGRESS NOTES
Physical Therapy    Physical Therapy Treatment    Patient Name: Shu Dimas  MRN: [id number]  Today's Date: 4/2/2024  Time Calculation  Start Time: 0815  Stop Time: 0855  Time Calculation (min): 40 min      PT Therapeutic Procedures Time Entry  Neuromuscular Re-Education Time Entry: 15  Therapeutic Activity Time Entry: 25          Assessment:  Patient is agreeable to discharge today with plan to return to PT 2-3 weeks after her next round of botox on 5/23/24.  Minimal improvement in right ankle DF PROM and AROM with knee flexed has been achieved. No major change in functional mobility or gait quality since botox.  Today I measured how many times she caught her right toe during a full lap around the clinic and during 10 meter walk.  We will use this as a measure of progress or lack of progress when she returns to therapy.     Right ankle DF PROM knee extended = 3 deg, knee flexed = 7 deg.   Right ankle DF AROM = 4 deg with knee flexed (improved from 2 degrees).   No active javed DF with knee extended.   TUG = 19.75 secs with rollator (unchanged)  10 meter = 16.72 secs with rollator (3 toe catches) - improved from 19.74 secs  210 ft walk with rollator: 24 right toe catches   Plan:  Discharge from PT today.     Current Problem  1. Abnormal gait  Follow Up In Physical Therapy      2. Multiple sclerosis (CMS/HCC)  Follow Up In Physical Therapy      3. Right leg weakness  Follow Up In Physical Therapy            General    Subjective    No major changes. She hasn't been able to stretch over the last week because she has been sick.   Precautions  Low fall risk; MS    Pain  No pain reported    Objective      Outcome Measures:  Not tested  Treatments:  Neuromuscular Re-education: for spasticity management and neuromuscular control  - recumbent bike 3 mins forward, 3 mins backward  - standing R gastroc stretch on step. 1 min x 3   - standing R soleus stretch with foot on towel roll 1 min x 3  - supine R gastroc stretch with  strap. X 3 minutes    Therapeutic Activities:  - Gait assessment: Ambulated 210 ft x 2 with rollator. Right Toe drag about 15% of her steps today.   Discussed her HEP for discharge, methods for assessing change after her next round of botox, and ways for her to prevent functional decline when she is not in therapy.   Completed 10 meter walk, multiple TUGs, and gait assessment for discharge.       Not performed today:  - Standing right hip flexion (driving knee up to t-ball in // bars). 2 x 20  - squats w/ feet on incline. 2 x 15. In // bars  - seated right dorsiflexion with knee flexed 60 deg. 4 x 5 reps  OP EDUCATION:       Goals:  Resolved       MS, spasticity, right sided weakness       ankle ROM (Progressing)       Start:  02/13/24    Expected End:  05/13/24    Resolved:  04/02/24    Improve right ankle DF PROM to: >5 degrees with knee extended, > 12 degrees knee flexed  Right ankle DF AROM: knee extended > -5, knee flexion > 4 deg.          Gait (Progressing)       Start:  02/13/24    Expected End:  05/13/24    Resolved:  04/02/24    Improve right foot clearance during gait to demonstrate right toe drag < 50% of steps.          HEP (Met)       Start:  02/13/24    Expected End:  05/13/24    Resolved:  04/02/24    Pt. Will be independent with HEP to maximize benefits of botox and ankle mobility         Patient Stated Goal 1 (Met)       Start:  02/13/24    Expected End:  05/13/24    Resolved:  04/02/24    To walk with less toe drag

## 2024-04-19 ENCOUNTER — OFFICE VISIT (OUTPATIENT)
Dept: NEUROLOGY | Facility: HOSPITAL | Age: 54
End: 2024-04-19
Payer: COMMERCIAL

## 2024-04-19 VITALS
WEIGHT: 227 LBS | HEART RATE: 78 BPM | SYSTOLIC BLOOD PRESSURE: 117 MMHG | RESPIRATION RATE: 19 BRPM | HEIGHT: 66 IN | DIASTOLIC BLOOD PRESSURE: 75 MMHG | BODY MASS INDEX: 36.48 KG/M2

## 2024-04-19 DIAGNOSIS — G35 MULTIPLE SCLEROSIS (MULTI): Primary | ICD-10-CM

## 2024-04-19 PROCEDURE — 3074F SYST BP LT 130 MM HG: CPT | Performed by: PSYCHIATRY & NEUROLOGY

## 2024-04-19 PROCEDURE — 3078F DIAST BP <80 MM HG: CPT | Performed by: PSYCHIATRY & NEUROLOGY

## 2024-04-19 PROCEDURE — 99214 OFFICE O/P EST MOD 30 MIN: CPT | Performed by: PSYCHIATRY & NEUROLOGY

## 2024-04-19 PROCEDURE — 99214 OFFICE O/P EST MOD 30 MIN: CPT | Mod: GC | Performed by: PSYCHIATRY & NEUROLOGY

## 2024-04-19 PROCEDURE — 1036F TOBACCO NON-USER: CPT | Performed by: PSYCHIATRY & NEUROLOGY

## 2024-04-19 NOTE — PATIENT INSTRUCTIONS
Dear Shu Dimas,    You were seen today by Dr. Calloway and Dr. Taylor for Multiple Sclerosis.    It was a pleasure to see you today. We are glad to hear your symptoms are not worsening. We think you are doing well overall from a multiple sclerosis standpoint.    Please continue to take ocrevus. Your next infusion will be in June 2024.    We will continue getting an MRI of your brain again in 2 years.    Please return to clinic in 6 months.    We wish you all the best,  [facility] Team

## 2024-04-19 NOTE — PROGRESS NOTES
"Tessie Dimas is a 54 y.o. year old female that presents for follow-up visit for multiple sclerosis.      History of Present Illness     DISEASE SUMMARY/DIAGNOSTICS:  Onset: ~   Diagnosis of MS:   Previous disease therapies: IVMP May 2016, Copaxone  Current disease therapies: Resumed Ocrevus, last infusion 2023 new induction after chemo, next infusion due in 2023  Most recent MRI brain: 2023 vs 2021, stable inactive  Most recent MRI cervical spine: 2019: stable, no changes compared to old scans  Most recent MRI thoracic spine: 11/15  CSF: 16, prot 50, high IgG index and synthesis rate, pos OCB  g/22 493 (stable)    HPI      Interval History:   She presents with her  today. States she is feeling ok. She's seen Dr. Noonan several time for botox for spasticity in the right leg which she thinks has helped some for about one week. Has a stable amount of weakness in in the RUE. She is doing home PT/OT exercises that she is doing. States her level of fatigue is improved since last 2023.    She is having some left ankle pain. This left ankle has given out a few times and causes her to fall. She uses a walker at all times.     Denies any other new neurologic symptoms - no new numbness, tingling, weakness, vision changes, bowel incontinence, nausea, vomiting, cognitive changes. Mood is stable. Has some urinary incontinence that is improving. Has esophageal stricture, but no worsening swallowing issues currently.    Past Surgical History:   Procedure Laterality Date     SECTION, CLASSIC  10/26/2015     Section    HYSTERECTOMY  10/26/2015    Hysterectomy     Social History     Tobacco Use    Smoking status: Never    Smokeless tobacco: Never   Substance Use Topics    Alcohol use: Never     No Known Allergies  Visit Vitals  /75   Pulse 78   Resp 19   Ht 1.676 m (5' 6\")   Wt 103 kg (227 lb)   BMI 36.64 kg/m²   Smoking Status Never   BSA 2.19 m² "       Objective   Neurological Exam  Physical Exam    GENERAL: Resting comfortably, no acute distress  HENT: No scleral icterus or conjunctival erythema. No carotid bruit.  CARDIO: Normal S1 and S2 without murmurs. RRR.  PULM: Vesicular breath sounds without wheezes or crackles.  ABD: Soft, nontender, bowel sounds active, no guarding/rigidity/rebound tenderness    MENTAL STATE: Orientation was normal to person, place, situaiton, date (one day off on the date). Recent and remote memory was intact. Attention span and concentration were normal. Language testing was normal for comprehension, naming, repetition and expression. General fund of knowledge was intact.     OPHTHALMOSCOPIC: The ophthalmoscopic exam was normal. The fundi were well visualized with normal disc margins, clear vessels and vascular pulsations. No disc edema. The cup/disk ratio was not enlarged. No hemorrhages or exudates were present in the posterior segments that were visualized.     CRANIAL NERVES:   CN 2 Visual fields full to confrontation.   CN 3, 4, 6 Pupils round, 4 mm in diameter, equally reactive to light. Lids symmetric; no ptosis. EOMs normal alignment, full range with normal saccades, pursuit and convergence. No nystagmus. Gaze evoked vertical nystagmus with upgaze.  CN 5 Facial sensation intact bilaterally to light touch  CN 7 Normal and symmetric facial strength. Nasolabial folds symmetric.   CN 8 Hearing intact to conversation.  CN 9 Palate elevates symmetrically.   CN 11 Normal strength of shoulder shrug.  CN 12 Tongue midline, with normal bulk and strength; no fasciculations.     MOTOR: Muscle bulk was normal and tone was normal in both upper and lower extremities. No adventitious movements.                     R L  Delt           5 5  Bicep         5 5  Tricep        5 5   Wrist Flex  5 5   Wrist Ext  5 5             5 5  Finger abd 5- 5  APB  4 5    Hip Flex      3 5-  Hip Add      5- 5  Leg Ext      4 5  Leg Flex     4 5  DF              5 5  PF             5 5   Toe ext  5 5    Notably has pain around the left ankle.    REFLEXES:     R L  BR:  2+ 2+     Biceps:  2+ 2+   Triceps:  2+  2+      Knee:  2+  3+  Ankle:  2+ 0** **ankle pain, difficulty relaxing ankle    Babinski: toes upgoing to plantar stimulation. Negative Munson. No ankle clonus.    SENSORY: Sensory exam was normal. In both upper and lower extremities, sensation was intact to light touch, temperature    COORDINATION: RUE>LUE ataxia on finger nose finger. CELIA intact bilaterally. Heel to shin intact on the left, unable to perform on the right due to hip flexor weakness.     GAIT: Paretic gait with right foot drop. Uses a walker.         Assessment/Plan     This is a 54 y.o. year old  right handed woman with a history of multiple sclerosis, breast cancer (s/p chemotherapy and radiation 2023), HTN, GERD, who presents follow-up appointment for multiple sclerosis. Given her clinical course, she continues to demonstrate primary progressive multiple sclerosis. Her neurologic exam showed gaze evoked vertical nystagmus, right lower extremity spasiticity, and right lower extremity weakness. At this time she continues to be stable on ocrevus. Will continue ocrevus and plan on repeated an MRI brain next year in June 2025.       Plan:  -Continue ocrevus  -MRI brain w/wo every 2 years (next MRI will be June 2025)  -Follow-up with scheduled appointment with ankle specialist for her ankle pain    No orders of the defined types were placed in this encounter.

## 2024-04-22 PROCEDURE — RXMED WILLOW AMBULATORY MEDICATION CHARGE

## 2024-04-23 ENCOUNTER — PHARMACY VISIT (OUTPATIENT)
Dept: PHARMACY | Facility: CLINIC | Age: 54
End: 2024-04-23
Payer: COMMERCIAL

## 2024-04-25 DIAGNOSIS — D84.821 DRUG-INDUCED IMMUNODEFICIENCY (MULTI): ICD-10-CM

## 2024-04-25 DIAGNOSIS — G35 MULTIPLE SCLEROSIS (MULTI): Primary | ICD-10-CM

## 2024-04-25 DIAGNOSIS — Z79.899 DRUG-INDUCED IMMUNODEFICIENCY (MULTI): ICD-10-CM

## 2024-04-25 RX ORDER — FAMOTIDINE 10 MG/ML
20 INJECTION INTRAVENOUS ONCE AS NEEDED
Status: CANCELLED | OUTPATIENT
Start: 2024-06-11

## 2024-04-25 RX ORDER — ACETAMINOPHEN 325 MG/1
650 TABLET ORAL ONCE
Status: CANCELLED | OUTPATIENT
Start: 2024-06-11

## 2024-04-25 RX ORDER — ALBUTEROL SULFATE 0.83 MG/ML
3 SOLUTION RESPIRATORY (INHALATION) AS NEEDED
Status: CANCELLED | OUTPATIENT
Start: 2024-06-11

## 2024-04-25 RX ORDER — ONDANSETRON HYDROCHLORIDE 2 MG/ML
4 INJECTION, SOLUTION INTRAVENOUS ONCE
Status: CANCELLED | OUTPATIENT
Start: 2024-06-11 | End: 2024-06-11

## 2024-04-25 RX ORDER — DIPHENHYDRAMINE HYDROCHLORIDE 50 MG/ML
50 INJECTION INTRAMUSCULAR; INTRAVENOUS AS NEEDED
Status: CANCELLED | OUTPATIENT
Start: 2024-06-11

## 2024-04-25 RX ORDER — EPINEPHRINE 0.3 MG/.3ML
0.3 INJECTION SUBCUTANEOUS EVERY 5 MIN PRN
Status: CANCELLED | OUTPATIENT
Start: 2024-06-11

## 2024-04-25 RX ORDER — DIPHENHYDRAMINE HYDROCHLORIDE 50 MG/ML
25 INJECTION INTRAMUSCULAR; INTRAVENOUS ONCE
Status: CANCELLED | OUTPATIENT
Start: 2024-06-11

## 2024-05-20 PROCEDURE — RXMED WILLOW AMBULATORY MEDICATION CHARGE

## 2024-05-21 ENCOUNTER — PHARMACY VISIT (OUTPATIENT)
Dept: PHARMACY | Facility: CLINIC | Age: 54
End: 2024-05-21
Payer: COMMERCIAL

## 2024-05-23 ENCOUNTER — PROCEDURE VISIT (OUTPATIENT)
Dept: NEUROLOGY | Facility: CLINIC | Age: 54
End: 2024-05-23
Payer: COMMERCIAL

## 2024-05-23 VITALS — BODY MASS INDEX: 36.15 KG/M2 | WEIGHT: 224 LBS

## 2024-05-23 DIAGNOSIS — M62.838 MUSCLE SPASM: ICD-10-CM

## 2024-05-23 DIAGNOSIS — G35 MS (MULTIPLE SCLEROSIS) (MULTI): Primary | ICD-10-CM

## 2024-05-23 PROCEDURE — 64642 CHEMODENERV 1 EXTREMITY 1-4: CPT | Performed by: PSYCHIATRY & NEUROLOGY

## 2024-05-23 PROCEDURE — 99214 OFFICE O/P EST MOD 30 MIN: CPT | Performed by: PSYCHIATRY & NEUROLOGY

## 2024-05-23 PROCEDURE — 95874 GUIDE NERV DESTR NEEDLE EMG: CPT | Performed by: PSYCHIATRY & NEUROLOGY

## 2024-05-23 RX ORDER — TIZANIDINE 2 MG/1
TABLET ORAL
Qty: 90 TABLET | Refills: 3 | Status: SHIPPED | OUTPATIENT
Start: 2024-05-23

## 2024-05-23 NOTE — PROGRESS NOTES
Neurology Botulinum Injection    Subjective   Shu Dimas is an 54 y.o. female here for medical botulinum injection for MS (multiple sclerosis) (Multi) [G35]. ***    Objective   Neurological Exam  Physical Exam  Procedures  Assessment/Plan   ***  {Assess/PlanSmartLinks:22935}

## 2024-05-23 NOTE — PROGRESS NOTES
Subjective []Expand by Default  HPI        She is a 54 yo right handed female patient with hx of MS, breast cancer s/p lumpectomy/radiation/chemotherapy, and HTN who is referred by Dr. Calloway for advanced spasticity evaluation. She is seen along with her . Will share the note with the referring physician via EMR        Interval HX: slight short lived benefit from botox but no side effects.      Cause of spasticity: MS  Cerebral spasticity: possible  Spinal spasticity: yes  Mobility: walker  Transfers: on her own   Falls: yes twice a year  AFO:  could not walk with it. She has a heel lift on the left.      Spasticity interferes with residual function: yes spasticity affects walking.   Spasticity interferes with ROM and posture: ankle feels stiff but no abnormal posture  Spasticity causes pain and/or discomfort:  infrequent isometric spasms in the right calf, twice a year or so, painful, relieved by movement.   Spasticity interferes with hygiene or skin care: no   UTIs and last urine check: yes but no effect on spasticity.   temperature effect: very cold weather causes her whole body to be tight     Tonic spasms:   Flexor: no   Extensor: no   Adductor:  no   Isometric (cramps):   infrequent isometric spasms in the right calf, twice a year or so, painful, relieved by movement.   Complex (more than 1 at a time): no   Painful:  yes see above   Focal dystonia:   no   Myoclonus:  no  Spontaneous clonus: no     Tremor:  no   RLS:  no      Treatment:  Baclofen: took it in the past but stopped it because it wasn't helping. Does not recall the dose. could not tolerate a retry.   Tizanidine:  not taking   Dantrolene: not taking  Gabapentin: took it in the past for shingles   pregabalin: no   Tegretol:  no   Trileptal:  no   Anticholinergics: no    Dopamine agonist: no   Botulinum toxin: here to discuss.   Baclofen pump: no  Other treatments: no      PT: doing currently, helpful   OT: did in the past, helpful   Stretching:  yes daily   Exercise: no but used to do stationary bike in the past.      Goals of spasticity management:   Function: improve walking and gait.   ROM/posture:  no goals  Comfort:  no goals  Hygiene and skin care:  no goals.            Review of Systems  All other system have been reviewed and are negative for complaint.        Objective   Neurological Exam     Physical Exam                      Spasticity exam:     Modified Doris Scale (MAS)  Elbow Extension Score: Slight increase at the end of the range of motion  Elbow Flexion Score: No increase  Wrist Extension Score: No increase  Finger Extension Score: No increase  Thumb ABduction Score: No increase  Hamstring Extension Score: Slight increase, minimal resistance throughout the remainder (less than half) of the range of motion  Quadriceps Extension Score: Slight increase, minimal resistance throughout the remainder (less than half) of the range of motion  Ankle Dorsiflexion-Gastrocnemius Score: Considerable increase  Ankle Dorsiflexion-Soleus Score: Considerable increase  Modified Doris Scale Comments: on the right        [place] Spasm Frequency Scale:        Score = 1     ----------------------------  Extensor spasms: no   Flexor spasms: no   Adductor spasms: no  Isometric spasms: not on exam  Complex spasms: no  Abnormal fixed posture or fixed dystonia: no  Paroxysmal focal dystonia: no  Inducible clonus: two beats of unsustained ankle clonus on the left  Spontaneous clonus: no   Myoclonus: no  Tremor: no  RLS/akathisia: no               Procedure:Rt med GC 75 units, Rt lat GC 75 units, and Rt soleus 50 units.   Lot:[id number]  Exp:06/2026               Assessment/Plan   This is a 45 yo originally right handed female with hx of MS, breast cancer s/p lumpectomy/radiation/chemotherapy, and HTN who is referred for advanced spasticity management. Main concern is improving gait and right ankle stiffness. Exam positive for RLE weakness with moderate spasticity of  the right hemibody most pronounced around the right ankle. No tonic spasms, myoclonus, or tremor on exam. oral baclofen has been ineffective in the past but she cannot recall the maximum dose she tried. Patient can be a good candidate for repeat oral baclofen trial and targeted botulinum toxin injection to the following muscles on the right: med GC 25 units, lat GC 25 units, and soleus 50 units. The doses can be increased over time as tolerated. ITB is not indicated given focal spasticity and lack of sufficient oral and injectable trials. she did not have that much signal on injection.      1/25/2024: could not tolerate baclofen and stopped it. First injection session tolerated well. Low signal on EMG. May consider tizanidine in the future.    5/23/2024:Mild  benefit from Botox last visit.Was able to move ankle better.We doubled the dose this time and injected 200 units and ordered Tizanidine 2 mg bedtime.the signal in Soleus was louder than Gastrocnemius.        PLAN:  You were injected with a total of 200 units of botox. You tolerated the procedure well. The effect of botox usually kicks in after 3 to 5 days and lasts for three months. Please call office after about a week to report how you are doing. Follow up after three months for repeat injections.      Will order physical and occupational therapy.     We ordered Tizanidine.Take one at bedtime.     Please continue daily stretching.

## 2024-05-23 NOTE — PATIENT INSTRUCTIONS
You were injected with a total of 200 units of botox. You tolerated the procedure well. The effect of botox usually kicks in after 3 to 5 days and lasts for three months. Please call office after about a week to report how you are doing. Follow up after three months for repeat injections.      Will order physical and occupational therapy.      We ordered Tizanidine.Take one at bedtime.     Please continue daily stretching.

## 2024-06-04 ENCOUNTER — DOCUMENTATION (OUTPATIENT)
Dept: INFUSION THERAPY | Facility: CLINIC | Age: 54
End: 2024-06-04
Payer: COMMERCIAL

## 2024-06-04 DIAGNOSIS — G35 MULTIPLE SCLEROSIS (MULTI): Primary | ICD-10-CM

## 2024-06-04 NOTE — PROGRESS NOTES
Patient to be scheduled for Continuation  of Ocrevus Infusions.   Dosed on Day 1 and Day 15 (Induction) and then every 6 months thereafter (maintenance)  For Diagnosis: Multiple Sclerosis    Premeds ordered? Yes    Labs..  T-Spot Drawn/Results:   Lab Results   Component Value Date    TBSIN Negative 06/29/2020      Hep B Sag Drawn/Results: ORDERED    Last infusion received: 12/2023  (if continuation) Due: 6/2024    Induction and Maintenance Therapy Plans entered if needed? Not applicable (if no provider to be contacted to etner)    This result meets treatment criteria.

## 2024-06-11 ENCOUNTER — APPOINTMENT (OUTPATIENT)
Dept: INFUSION THERAPY | Facility: CLINIC | Age: 54
End: 2024-06-11
Payer: COMMERCIAL

## 2024-06-11 VITALS
WEIGHT: 226.08 LBS | BODY MASS INDEX: 36.49 KG/M2 | RESPIRATION RATE: 16 BRPM | SYSTOLIC BLOOD PRESSURE: 114 MMHG | OXYGEN SATURATION: 94 % | DIASTOLIC BLOOD PRESSURE: 72 MMHG | HEART RATE: 87 BPM | TEMPERATURE: 97.3 F

## 2024-06-11 DIAGNOSIS — Z79.899 DRUG-INDUCED IMMUNODEFICIENCY (MULTI): ICD-10-CM

## 2024-06-11 DIAGNOSIS — G35 MULTIPLE SCLEROSIS (MULTI): ICD-10-CM

## 2024-06-11 DIAGNOSIS — D84.821 DRUG-INDUCED IMMUNODEFICIENCY (MULTI): ICD-10-CM

## 2024-06-11 LAB
BASOPHILS # BLD AUTO: 0.03 X10*3/UL (ref 0–0.1)
BASOPHILS NFR BLD AUTO: 0.3 %
EOSINOPHIL # BLD AUTO: 0.01 X10*3/UL (ref 0–0.7)
EOSINOPHIL NFR BLD AUTO: 0.1 %
ERYTHROCYTE [DISTWIDTH] IN BLOOD BY AUTOMATED COUNT: 12.9 % (ref 11.5–14.5)
HBV SURFACE AG SERPL QL IA: NONREACTIVE
HCT VFR BLD AUTO: 45.5 % (ref 36–46)
HGB BLD-MCNC: 15 G/DL (ref 12–16)
IMM GRANULOCYTES # BLD AUTO: 0.04 X10*3/UL (ref 0–0.7)
IMM GRANULOCYTES NFR BLD AUTO: 0.4 % (ref 0–0.9)
LYMPHOCYTES # BLD AUTO: 0.46 X10*3/UL (ref 1.2–4.8)
LYMPHOCYTES NFR BLD AUTO: 4.8 %
MCH RBC QN AUTO: 30.3 PG (ref 26–34)
MCHC RBC AUTO-ENTMCNC: 33 G/DL (ref 32–36)
MCV RBC AUTO: 92 FL (ref 80–100)
MONOCYTES # BLD AUTO: 0.04 X10*3/UL (ref 0.1–1)
MONOCYTES NFR BLD AUTO: 0.4 %
NEUTROPHILS # BLD AUTO: 8.91 X10*3/UL (ref 1.2–7.7)
NEUTROPHILS NFR BLD AUTO: 94 %
NRBC BLD-RTO: 0 /100 WBCS (ref 0–0)
PLATELET # BLD AUTO: 245 X10*3/UL (ref 150–450)
RBC # BLD AUTO: 4.95 X10*6/UL (ref 4–5.2)
WBC # BLD AUTO: 9.5 X10*3/UL (ref 4.4–11.3)

## 2024-06-11 PROCEDURE — 85025 COMPLETE CBC W/AUTO DIFF WBC: CPT

## 2024-06-11 PROCEDURE — 96413 CHEMO IV INFUSION 1 HR: CPT | Performed by: NURSE PRACTITIONER

## 2024-06-11 PROCEDURE — 96415 CHEMO IV INFUSION ADDL HR: CPT | Performed by: NURSE PRACTITIONER

## 2024-06-11 PROCEDURE — 87340 HEPATITIS B SURFACE AG IA: CPT

## 2024-06-11 PROCEDURE — 96375 TX/PRO/DX INJ NEW DRUG ADDON: CPT | Performed by: NURSE PRACTITIONER

## 2024-06-11 RX ORDER — ALBUTEROL SULFATE 0.83 MG/ML
3 SOLUTION RESPIRATORY (INHALATION) AS NEEDED
OUTPATIENT
Start: 2024-12-11

## 2024-06-11 RX ORDER — ONDANSETRON HYDROCHLORIDE 2 MG/ML
4 INJECTION, SOLUTION INTRAVENOUS ONCE
OUTPATIENT
Start: 2024-12-11 | End: 2024-12-11

## 2024-06-11 RX ORDER — DIPHENHYDRAMINE HYDROCHLORIDE 50 MG/ML
50 INJECTION INTRAMUSCULAR; INTRAVENOUS AS NEEDED
OUTPATIENT
Start: 2024-12-11

## 2024-06-11 RX ORDER — DIPHENHYDRAMINE HYDROCHLORIDE 50 MG/ML
25 INJECTION INTRAMUSCULAR; INTRAVENOUS ONCE
OUTPATIENT
Start: 2024-12-11

## 2024-06-11 RX ORDER — ONDANSETRON HYDROCHLORIDE 2 MG/ML
4 INJECTION, SOLUTION INTRAVENOUS ONCE
Status: COMPLETED | OUTPATIENT
Start: 2024-06-11 | End: 2024-06-11

## 2024-06-11 RX ORDER — EPINEPHRINE 0.3 MG/.3ML
0.3 INJECTION SUBCUTANEOUS EVERY 5 MIN PRN
OUTPATIENT
Start: 2024-12-11

## 2024-06-11 RX ORDER — ACETAMINOPHEN 325 MG/1
650 TABLET ORAL ONCE
OUTPATIENT
Start: 2024-12-11

## 2024-06-11 RX ORDER — DIPHENHYDRAMINE HYDROCHLORIDE 50 MG/ML
25 INJECTION INTRAMUSCULAR; INTRAVENOUS ONCE
Status: COMPLETED | OUTPATIENT
Start: 2024-06-11 | End: 2024-06-11

## 2024-06-11 RX ORDER — ACETAMINOPHEN 325 MG/1
650 TABLET ORAL ONCE
Status: COMPLETED | OUTPATIENT
Start: 2024-06-11 | End: 2024-06-11

## 2024-06-11 RX ORDER — FAMOTIDINE 10 MG/ML
20 INJECTION INTRAVENOUS ONCE AS NEEDED
OUTPATIENT
Start: 2024-12-11

## 2024-06-11 RX ADMIN — ONDANSETRON HYDROCHLORIDE 4 MG: 2 INJECTION, SOLUTION INTRAVENOUS at 09:20

## 2024-06-11 RX ADMIN — DIPHENHYDRAMINE HYDROCHLORIDE 25 MG: 50 INJECTION INTRAMUSCULAR; INTRAVENOUS at 08:55

## 2024-06-11 RX ADMIN — ACETAMINOPHEN 650 MG: 325 TABLET ORAL at 08:50

## 2024-06-11 RX ADMIN — ONDANSETRON HYDROCHLORIDE 4 MG: 2 INJECTION, SOLUTION INTRAVENOUS at 13:45

## 2024-06-11 ASSESSMENT — ENCOUNTER SYMPTOMS
WHEEZING: 0
FEVER: 0
UNEXPECTED WEIGHT CHANGE: 0
SORE THROAT: 0
CHEST TIGHTNESS: 0
WOUND: 0
ARTHRALGIAS: 0
TROUBLE SWALLOWING: 0
COUGH: 0
DIFFICULTY URINATING: 0
SLEEP DISTURBANCE: 0
PALPITATIONS: 0
APPETITE CHANGE: 0
LEG SWELLING: 0
NERVOUS/ANXIOUS: 0
DIZZINESS: 0
EYE PROBLEMS: 0
FREQUENCY: 0
SHORTNESS OF BREATH: 0
MYALGIAS: 0
DEPRESSION: 0
FATIGUE: 0
CONSTIPATION: 0
VOMITING: 0
LIGHT-HEADEDNESS: 0
CONFUSION: 0
SEIZURES: 0
NAUSEA: 0
NUMBNESS: 0
DIARRHEA: 0
CHILLS: 0

## 2024-06-11 NOTE — PATIENT INSTRUCTIONS
Today :We administered acetaminophen, methylPREDNISolone sod succinate, diphenhydrAMINE, ondansetron, ocrelizumab (Ocrevus) 600 mg in sodium chloride 0.9% 520 mL IV, and ondansetron.     For:   1. Multiple sclerosis (Multi)    2. Drug-induced immunodeficiency (Multi)         Your next appointment is due in:  6 MONTHS         Please read the  Medication Guide that was given to you and reviewed during todays visit.     (Tell all doctors including dentists that you are taking this medication)     Go to the emergency room or call 911 if:  -You have signs of allergic reaction:   -Rash, hives, itching.   -Swollen, blistered, peeling skin.   -Swelling of face, lips, mouth, tongue or throat.   -Tightness of chest, trouble breathing, swallowing or talking     Call your doctor:  - If IV / injection site gets red, warm, swollen, itchy or leaks fluid or pus.     (Leave dressing on your IV site for at least 2 hours and keep area clean and dry  - If you get sick or have symptoms of infection or are not feeling well for any reason.    (Wash your hands often, stay away from people who are sick)  - If you have side effects from your medication that do not go away or are bothersome.     (Refer to the teaching your nurse gave you for side effects to call your doctor about)    - Common side effects may include:  stuffy nose, headache, feeling tired, muscle aches, upset stomach  - Before receiving any vaccines     - Call the [facility] at   If:  - You get sick, are on antibiotics, have had a recent vaccine, have surgery or dental work and your doctor wants your visit rescheduled.  - You need to cancel and reschedule your visit for any reason. Call at least 2 days before your visit if you need to cancel.   - Your insurance changes before your next visit.    (We will need to get approval from your new insurance. This can take up to two weeks.)     The [facility] is opened Monday thru Friday. We are closed  on weekends and holidays.   Voice mail will take your call if the center is closed. If you leave a message please allow 24 hours for a call back during weekdays. If you leave a message on a weekend/holiday, we will call you back the next business day.

## 2024-06-11 NOTE — PROGRESS NOTES
[facility]   Infusion Clinic Note   Date: 2024   Name: Shu Dimas  : 1970   MRN: [id number]         Reason for Visit: OP Infusion (PT HERE FOR Q6 MO OCREVUS 600 MG INFUSION)         Today: We administered acetaminophen, methylPREDNISolone sod succinate, diphenhydrAMINE, ondansetron, ocrelizumab (Ocrevus) 600 mg in sodium chloride 0.9% 520 mL IV, and ondansetron.       Visit Type: INFUSION       Ordered By: DR. SMITH      Accompanied by:Significant Other      Diagnosis: Multiple sclerosis (Multi)    Drug-induced immunodeficiency (Multi)       Allergies:   Allergies as of 2024    (No Known Allergies)         Current Medications has a current medication list which includes the following prescription(s): amitriptyline, bisoprolol-hydrochlorothiazide, cholecalciferol, cholecalciferol, dalfampridine, diphenhydramine, mirabegron, multivitamin, ocrelizumab, omeprazole, omeprazole, ondansetron, polyethylene glycol, bisoprolol-hydrochlorothiazide, celecoxib, clotrimazole-betamethasone, famotidine, ferrous sulfate (325 mg ferrous sulfate), fluconazole, furosemide, gabapentin, guaifenesin, levofloxacin, meloxicam, mometasone, olanzapine, oxycodone-acetaminophen, polymyxin b sulf-trimethoprim, potassium chloride cr, sodium chloride, tizanidine, venlafaxine xr, and vitamin b-12, and the following Facility-Administered Medications: onabotulinumtoxina and onabotulinumtoxina.       Vitals:   Vitals:    24 0810 24 0930 24 1000 24 1030   BP: 134/84 109/74 110/73 108/72   Pulse: 83 72 76 75   Resp: 16 16 16 16   Temp: 36.3 °C (97.3 °F) 36.2 °C (97.1 °F) 36.2 °C (97.2 °F) 36.2 °C (97.1 °F)   SpO2: 96% 98% 97% 95%   Weight: 103 kg (226 lb 1.3 oz)       24 1100 24 1130 24 1230 24 1300   BP: 113/78 115 121/77 111/73   Pulse: 76 76 85 91   Resp: 16 16 16 16   Temp: 36.4 °C (97.6 °F) 36.3 °C (97.3 °F) 36.3 °C (97.3 °F) 36.3 °C (97.3 °F)    SpO2: 96% 94% 94% 93%   Weight:        06/11/24 1330   BP: 114/72   Pulse: 87   Resp: 16   Temp: 36.3 °C (97.3 °F)   SpO2: 94%   Weight:              Infusion Pre-procedure Checklist:   - Allergies reviewed: yes   - Medications reviewed: yes       - Previous reaction to current treatment: no. PT STATES SHE GETS NAUSEA      Assess patient for the concerns below. Document provider notification as appropriate.  - Active or recent infection with/without current antibiotic use: no  - Recent or planned invasive dental work: no  - Recent or planned surgeries: yes. PLANNED FOR JUNE 27TH - LEFT ANKLE SURGERY  - Recently received or plans to receive vaccinations: no  - Has treatment related toxicities: n/a  - Is pregnant:  no. PARTIAL HYSTERECTOMY.       Pain: 0   - Is the pain different from normal: n/a   - Is the pain tolerable: n/a   - Is your Doctor aware:  n/a      Labs: N/A         Fall Risk Screening: Mena Fall Risk  History of Falling, Immediate or Within 3 Months: Yes  Secondary Diagnosis: Yes  Ambulatory Aid: Crutches/cane/walker  Intravenous Therapy/Heparin Lock: Yes  Gait/Transferring: Normal/bedrest/immobile  Mental Status: Oriented to own ability  Mena Fall Risk Score: 75       Review Of Systems:  Review of Systems   Constitutional:  Negative for appetite change, chills, fatigue, fever and unexpected weight change.   HENT:   Negative for hearing loss, mouth sores, nosebleeds, sore throat, tinnitus and trouble swallowing.    Eyes:  Negative for eye problems.        GLASSES   Respiratory:  Negative for chest tightness, cough, shortness of breath and wheezing.    Cardiovascular:  Negative for chest pain, leg swelling and palpitations.   Gastrointestinal:  Negative for constipation, diarrhea, nausea and vomiting.   Genitourinary:  Negative for bladder incontinence, difficulty urinating and frequency.    Musculoskeletal:  Negative for arthralgias, gait problem and myalgias.   Skin:  Negative for itching, rash and  "wound.   Neurological:  Negative for dizziness, gait problem, light-headedness, numbness and seizures.   Psychiatric/Behavioral:  Negative for confusion, depression, sleep disturbance and suicidal ideas. The patient is not nervous/anxious.          Infusion Readiness:   - Assessment Concerns Related to Infusion: No  - Provider notified: n/a      Document Below Only If Indicated:   New Patient Education:    N/A (returning patient for continuation of therapy. Ongoing education provided as needed.)        Treatment Conditions & Drug Specific Questions:    Ocrelizumab  (OCREVUS)    (Unless otherwise specified on patient specific therapy plan):     TREATMENT CONDITIONS:  Unless otherwise specified on patient specific therapy plan HOLD and notify provider prior to proceeding with today's infusion if patient with:  o Positive Hepatitis B Surface Ag or panel  o Positive T-Spot    Lab Results   Component Value Date    TBSIN Negative 06/29/2020      No results found for: \"HAGCN\", \"HEPBSURABI\", \"HBSAG\", \"XHAGF\", \"HEPBSAG\", \"EXTHEPBSAG\", \"NONUHSWGH\"   No results found for: \"HEPATOT\", \"HEPAIGM\", \"HEPBCIGM\", \"HEPBCAB\", \"HBEAG\", \"HEPCAB\"   No results found for: \"NONUHFIRE\", \"NONUHSWGH\", \"NONUHFISH\", \"EXTHEPBSAG\"    Labs reviewed and patient meets treatment conditions? Yes    DRUG SPECIFIC QUESTIONS:  Any signs or symptoms of Progressive multifocal leukoencephalopathy (PML) which may include: memory loss, trouble thinking, dizziness, loss of balance, difficulty talking / walking or loss of vision? No    (If YES HOLD and notify provider)      REMINDER:  Recommended Vitals/Observation:  Vitals: Obtain vital signs every 30 minutes / with each ramp up. Once maximum rate is reached obtain vitals hourly until infusion is complete. Obtain vitals at end of observation period and PRN.  Observation: Observe patient for 60 minutes after each infusion.        Weight Based Drug Calculations:    WEIGHT BASED DRUGS: NOT APPLICABLE / FLAT DOSE     "      Initiated By: Katty Esteves RN

## 2024-06-17 PROCEDURE — 93010 ELECTROCARDIOGRAM REPORT: CPT | Performed by: INTERNAL MEDICINE

## 2024-06-19 PROCEDURE — RXMED WILLOW AMBULATORY MEDICATION CHARGE

## 2024-06-20 ENCOUNTER — PHARMACY VISIT (OUTPATIENT)
Dept: PHARMACY | Facility: CLINIC | Age: 54
End: 2024-06-20
Payer: COMMERCIAL

## 2024-07-15 PROCEDURE — RXMED WILLOW AMBULATORY MEDICATION CHARGE

## 2024-07-17 ENCOUNTER — PHARMACY VISIT (OUTPATIENT)
Dept: PHARMACY | Facility: CLINIC | Age: 54
End: 2024-07-17
Payer: COMMERCIAL

## 2024-08-12 PROCEDURE — RXMED WILLOW AMBULATORY MEDICATION CHARGE

## 2024-08-13 ENCOUNTER — PHARMACY VISIT (OUTPATIENT)
Dept: PHARMACY | Facility: CLINIC | Age: 54
End: 2024-08-13
Payer: COMMERCIAL

## 2024-09-11 DIAGNOSIS — G35 MULTIPLE SCLEROSIS (MULTI): ICD-10-CM

## 2024-09-11 RX ORDER — DALFAMPRIDINE 10 MG/1
10 TABLET, FILM COATED, EXTENDED RELEASE ORAL 2 TIMES DAILY
Qty: 60 TABLET | Refills: 5 | Status: SHIPPED | OUTPATIENT
Start: 2024-09-11

## 2024-09-12 PROCEDURE — RXMED WILLOW AMBULATORY MEDICATION CHARGE

## 2024-09-13 ENCOUNTER — SPECIALTY PHARMACY (OUTPATIENT)
Dept: PHARMACY | Facility: CLINIC | Age: 54
End: 2024-09-13

## 2024-09-17 ENCOUNTER — CLINICAL SUPPORT (OUTPATIENT)
Dept: PHYSICAL THERAPY | Facility: CLINIC | Age: 54
End: 2024-09-17
Payer: COMMERCIAL

## 2024-09-17 DIAGNOSIS — R25.2 SPASTICITY: ICD-10-CM

## 2024-09-17 DIAGNOSIS — G35 MS (MULTIPLE SCLEROSIS) (MULTI): ICD-10-CM

## 2024-09-17 DIAGNOSIS — R29.898 RIGHT LEG WEAKNESS: ICD-10-CM

## 2024-09-17 DIAGNOSIS — R26.9 ABNORMAL GAIT: Primary | ICD-10-CM

## 2024-09-17 PROCEDURE — 97110 THERAPEUTIC EXERCISES: CPT | Mod: GP | Performed by: PHYSICAL THERAPIST

## 2024-09-17 PROCEDURE — 97161 PT EVAL LOW COMPLEX 20 MIN: CPT | Mod: GP | Performed by: PHYSICAL THERAPIST

## 2024-09-17 ASSESSMENT — ENCOUNTER SYMPTOMS
OCCASIONAL FEELINGS OF UNSTEADINESS: 0
LOSS OF SENSATION IN FEET: 0
DEPRESSION: 0

## 2024-09-17 NOTE — PROGRESS NOTES
Patient Name: Shu Dimas  MRN: [id number]  Today's Date: 9/17/2024  Visit #1  Payor: [employer] / Plan: [employer] / Product Type: *No Product type* /   NO AUTH, 500 DED-NOT MET, 100% COVERAGE, 30V PT/OT/ST ( 4V USED ) ANTHEM   Time Calculation  Start Time: 0745  Stop Time: 0830  Time Calculation (min): 45 min  Reason for Referral: MS, gait  Referred By: Dr. Bobby Noonan  Preferred Learning Style: kinesthetic, visual  PT Evaluation Time Entry  PT Evaluation (Low) Time Entry: 35  PT Therapeutic Procedures Time Entry  Therapeutic Exercise Time Entry: 10          Current Problem  Problem List Items Addressed This Visit             ICD-10-CM    Abnormal gait - Primary R26.9    Relevant Orders    Follow Up In Physical Therapy    Right leg weakness R29.898    Relevant Orders    Follow Up In Physical Therapy     Other Visit Diagnoses         Codes    Spasticity     R25.2    Relevant Orders    Follow Up In Physical Therapy    MS (multiple sclerosis) (Multi)     G35    Relevant Orders    Follow Up In Physical Therapy            Assessment:  Patient presents with chronic but stable MS symptoms including right sided weakness, tightness, decreased balance, and impaired mobility. There has been a decline in her performance on all functional outcome measures performed today. She will benefit from skilled PT to maximize her safety and functional mobility and establish a custom home program.     Plan:   OP PT Plan  Treatment/Interventions: Education/ Instruction, Gait training, Manual therapy, Neuromuscular re-education, Therapeutic activities, Therapeutic exercises, Orthosis fit/ training  PT Plan: Skilled PT  PT Frequency: 1 time per week  Duration: 8-12 visits  Rehab Potential: Fair  Plan of Care Agreement: Patient     General  Reason for Referral: MS, gait  Referred By: Dr. Bobby Noonan  Preferred Learning Style: kinesthetic, visual  Referred by: Bobby Noonan    Precautions  Precautions  STEADI Fall Risk Score (The score  of 4 or more indicates an increased risk of falling): 5  Precautions Comment: moderate fall risk     Fell 3 times prior to her left ankle surgery in 6/2024 due to left ankle giving way.   HPI: She returns for OP PT for reassessment for impairments related to MS and therapy to maximize her mobility. She feels that she has been mostly stable since last PT in April 2024 with me, but feels tighter and slightly less mobile. She has not been able to do her stretches as much as necessary due to multiple respiratory illnesses, COVID, and ankle surgery.   Last botox was at least 3 months ago. Scheduled for botox 9/26/24.  Prior or current Tx: Intermittent outpatient PT for MS symptoms - see chart    PMH: See chart    Pain: Denies    PLOF / Activity level: ambulates with rollator for years.   Home setup: no concerns currently. She is modified independent at home for mobility and ADLs  Occupation: Permanent disability    Objective:  Outcome Measures:   10 meter walk = 20.44 secs  TUG 21.05 secs with rollator  1 lap around clinic 210 ft in 141 seconds and 64 right toe catches. Some toe catches are severe and she needs to stop to recover anterior LOB with heavy reliance on rollator.    Assessment from Discharge on 4/2/24 for comparison:  Right ankle DF PROM knee extended = 3 deg, knee flexed = 7 deg.   Right ankle DF AROM = 4 deg with knee flexed (improved from 2 degrees).   No active javed DF with knee extended.   TUG = 19.75 secs with rollator (unchanged)  10 meter = 16.72 secs with rollator (3 toe catches) - improved from 19.74 secs  210 ft walk with rollator: 24 right toe catches    Posture: increased kyphosis, rounded shoulders and forward head    Gait: Ambulates with rollator: left lean and slight vaulting during right swing. LLE is adducted in stance. Reduced hip and knee flexion with R swing and unable to reach neutral dorsiflexion.   Stairs:   Transfers: Increased UE use and compensation for RLE  weakness    Coordination: NT    Tone: No considerable hypertonicity was noted    Sensation: light touch intact throughout LEs    Lower Extremity Strength:  Date: eval RIGHT LEFT   Hip Flexion 2-    4+   Hip Extension 4+ 5   Hip Abduction 2 4+   Hip Adduction     Knee Extension 4+ 5   Knee Flexion 2 5   Ankle DF 2- 5   Ankle PF 4+ 5   Ankle INV 4- 5   Ankle EV 4- 5     Lower Extremity ROM:  Date: eval RIGHT LEFT   Hip Flexion     Hip Extension     Hip IR     Hip ER     Knee Extension     Knee Flexion     Ankle DF 0 deg knee extended; +4 knee flexed    Ankle PF 25 deg    Ankle INV     Ankle EV       Tight quads: 92 degrees knee flexion in prone    Treatment:  Discussed previous HEP, especially stretching program.  Educated on stretches to perform for gastroc and soleus, quads, and hip flexor activation in standing.     HEP:      Goals:   Active       PT Problem       TUG       Start:  09/17/24    Expected End:  12/01/24       Improve TUG time to < 18.5 secs with rollator         10 meter walk        Start:  09/17/24    Expected End:  12/01/24       Improve 10 meter walk to < 18.0 secs         Gait       Start:  09/17/24    Expected End:  12/01/24       Patient will ambulate with greater right foot clearance, and less compensation for right swing. She will ambulate 210 ft with < 35 instances of toe drag/catching to improve safety and efficiency.          Right ankle Dorsiflexion       Start:  09/17/24    Expected End:  12/01/24       Right ankle DF PROM with knee extended > neutral, and knee flexed > 6 deg;            HEP       Start:  09/17/24    Expected End:  12/01/24       Patient will be independent with HEP to maximize her functional mobility         Patient stated goal       Start:  09/17/24    Expected End:  12/01/24       Reduce tightness and walk better

## 2024-09-20 ENCOUNTER — PHARMACY VISIT (OUTPATIENT)
Dept: PHARMACY | Facility: CLINIC | Age: 54
End: 2024-09-20
Payer: COMMERCIAL

## 2024-09-26 ENCOUNTER — APPOINTMENT (OUTPATIENT)
Dept: NEUROLOGY | Facility: CLINIC | Age: 54
End: 2024-09-26
Payer: COMMERCIAL

## 2024-09-26 VITALS — BODY MASS INDEX: 37.77 KG/M2 | WEIGHT: 234 LBS

## 2024-09-26 DIAGNOSIS — M62.838 MUSCLE SPASM: Primary | ICD-10-CM

## 2024-09-26 PROCEDURE — 64642 CHEMODENERV 1 EXTREMITY 1-4: CPT | Performed by: PSYCHIATRY & NEUROLOGY

## 2024-09-26 PROCEDURE — 95874 GUIDE NERV DESTR NEEDLE EMG: CPT | Performed by: PSYCHIATRY & NEUROLOGY

## 2024-09-26 PROCEDURE — 99214 OFFICE O/P EST MOD 30 MIN: CPT | Performed by: PSYCHIATRY & NEUROLOGY

## 2024-09-26 NOTE — PATIENT INSTRUCTIONS
You were injected with a total of 200 units of botox. You tolerated the procedure well. The effect of botox usually kicks in after 3 to 5 days and lasts for three months. Please call office after about a week to report how you are doing. Follow up after three months for repeat injections.      Will order physical and occupational therapy.      Please continue daily stretching.

## 2024-09-26 NOTE — PROGRESS NOTES
Subjective []Expand by Default  HPI        She is a 54 yo right handed female patient with hx of MS, breast cancer s/p lumpectomy/radiation/chemotherapy, and HTN who is referred by Dr. Calloway for advanced spasticity evaluation. She is seen along with her . Will share the note with the referring physician via EMR        Interval HX: Reports she does not see a lot of benefit. But similar to the prior injection. No SE. After last injection she had surgery on left ankle so it is hard to tell if any benefit. Tizanidine at bedtime made her anxious and restless so she stopped taking. Will repeat same injection today.     Cause of spasticity: MS  Cerebral spasticity: possible  Spinal spasticity: yes  Mobility: walker  Transfers: on her own   Falls: yes twice a year  AFO:  could not walk with it. She has a heel lift on the left.      Spasticity interferes with residual function: yes spasticity affects walking.   Spasticity interferes with ROM and posture: ankle feels stiff but no abnormal posture  Spasticity causes pain and/or discomfort:  infrequent isometric spasms in the right calf, twice a year or so, painful, relieved by movement.   Spasticity interferes with hygiene or skin care: no   UTIs and last urine check: yes but no effect on spasticity.   temperature effect: very cold weather causes her whole body to be tight     Tonic spasms:   Flexor: no   Extensor: no   Adductor:  no   Isometric (cramps):   infrequent isometric spasms in the right calf, twice a year or so, painful, relieved by movement.   Complex (more than 1 at a time): no   Painful:  yes see above   Focal dystonia:   no   Myoclonus:  no  Spontaneous clonus: no     Tremor:  no   RLS:  no      Treatment:  Baclofen: took it in the past but stopped it because it wasn't helping. Does not recall the dose. could not tolerate a retry.   Tizanidine:  Tried but did not tolerate. Caused anxiety and restlessness and she was not able to sleep.  Dantrolene: not  taking  Gabapentin: took it in the past for shingles   pregabalin: no   Tegretol:  no   Trileptal:  no   Anticholinergics: no    Dopamine agonist: no   Botulinum toxin: here to discuss.   Baclofen pump: no  Other treatments: no      PT: doing currently, helpful   OT: did in the past, helpful   Stretching: yes daily   Exercise: no but used to do stationary bike in the past.      Goals of spasticity management:   Function: improve walking and gait.   ROM/posture:  no goals  Comfort:  no goals  Hygiene and skin care:  no goals.            Review of Systems  All other system have been reviewed and are negative for complaint.        Objective   Neurological Exam     Physical Exam                      Spasticity exam:     Modified Doris Scale (MAS)  Elbow Extension Score: Slight increase at the end of the range of motion  Elbow Flexion Score: No increase  Wrist Extension Score: No increase  Finger Extension Score: No increase  Thumb ABduction Score: No increase  Hamstring Extension Score: Slight increase, minimal resistance throughout the remainder (less than half) of the range of motion  Quadriceps Extension Score: Slight increase, minimal resistance throughout the remainder (less than half) of the range of motion  Ankle Dorsiflexion-Gastrocnemius Score: Considerable increase  Ankle Dorsiflexion-Soleus Score: Considerable increase  Modified Doris Scale Comments: on the right        Monster Spasm Frequency Scale:        Score = 1     ----------------------------  Extensor spasms: no   Flexor spasms: no   Adductor spasms: no  Isometric spasms: not on exam  Complex spasms: no  Abnormal fixed posture or fixed dystonia: no  Paroxysmal focal dystonia: no  Inducible clonus: two beats of unsustained ankle clonus on the left  Spontaneous clonus: no   Myoclonus: no  Tremor: no  RLS/akathisia: no               Procedure:Rt med GC 50 units, Rt lat GC 50 units, and Rt soleus 100 units.       Assessment/Plan   This is a 47 yo  originally right handed female with hx of MS, breast cancer s/p lumpectomy/radiation/chemotherapy, and HTN who is referred for advanced spasticity management. Main concern is improving gait and right ankle stiffness. Exam positive for RLE weakness with moderate spasticity of the right hemibody most pronounced around the right ankle. No tonic spasms, myoclonus, or tremor on exam. oral baclofen has been ineffective in the past but she cannot recall the maximum dose she tried. Patient can be a good candidate for repeat oral baclofen trial and targeted botulinum toxin injection to the following muscles on the right: med GC 25 units, lat GC 25 units, and soleus 50 units. The doses can be increased over time as tolerated. ITB is not indicated given focal spasticity and lack of sufficient oral and injectable trials. she did not have that much signal on injection.      1/25/2024: could not tolerate baclofen and stopped it. First injection session tolerated well. Low signal on EMG. May consider tizanidine in the future.    5/23/2024:Mild  benefit from Botox last visit.Was able to move ankle better.We doubled the dose this time and injected 200 units and ordered Tizanidine 2 mg bedtime.the signal in Soleus was louder than Gastrocnemius.    9/26/2024:Reports she does not see a lot of benefit. But similar to the prior injection. No SE. After last injection she had surgery on left ankle so it is hard to tell if any benefit. Tizanidine at bedtime made her anxious and restless so she stopped taking. Will repeat same injection today.      PLAN:  You were injected with a total of 200 units of botox. You tolerated the procedure well. The effect of botox usually kicks in after 3 to 5 days and lasts for three months. Please call office after about a week to report how you are doing. Follow up after three months for repeat injections.      Will order physical and occupational therapy.      Please continue daily stretching.

## 2024-10-03 ENCOUNTER — APPOINTMENT (OUTPATIENT)
Dept: PHYSICAL THERAPY | Facility: CLINIC | Age: 54
End: 2024-10-03
Payer: COMMERCIAL

## 2024-10-14 PROCEDURE — RXMED WILLOW AMBULATORY MEDICATION CHARGE

## 2024-10-15 ENCOUNTER — APPOINTMENT (OUTPATIENT)
Dept: PHYSICAL THERAPY | Facility: CLINIC | Age: 54
End: 2024-10-15
Payer: COMMERCIAL

## 2024-10-15 ENCOUNTER — PHARMACY VISIT (OUTPATIENT)
Dept: PHARMACY | Facility: CLINIC | Age: 54
End: 2024-10-15
Payer: COMMERCIAL

## 2024-10-18 ENCOUNTER — OFFICE VISIT (OUTPATIENT)
Dept: NEUROLOGY | Facility: HOSPITAL | Age: 54
End: 2024-10-18
Payer: COMMERCIAL

## 2024-10-18 VITALS
SYSTOLIC BLOOD PRESSURE: 106 MMHG | HEART RATE: 103 BPM | HEIGHT: 66 IN | WEIGHT: 234 LBS | BODY MASS INDEX: 37.61 KG/M2 | DIASTOLIC BLOOD PRESSURE: 75 MMHG

## 2024-10-18 DIAGNOSIS — G35 MULTIPLE SCLEROSIS (MULTI): Primary | ICD-10-CM

## 2024-10-18 PROCEDURE — 3078F DIAST BP <80 MM HG: CPT | Performed by: PSYCHIATRY & NEUROLOGY

## 2024-10-18 PROCEDURE — 3008F BODY MASS INDEX DOCD: CPT | Performed by: PSYCHIATRY & NEUROLOGY

## 2024-10-18 PROCEDURE — 99213 OFFICE O/P EST LOW 20 MIN: CPT | Performed by: PSYCHIATRY & NEUROLOGY

## 2024-10-18 PROCEDURE — 3074F SYST BP LT 130 MM HG: CPT | Performed by: PSYCHIATRY & NEUROLOGY

## 2024-10-18 RX ORDER — MONTELUKAST SODIUM 10 MG/1
10 TABLET ORAL NIGHTLY
COMMUNITY

## 2024-10-18 RX ORDER — AMOXICILLIN AND CLAVULANATE POTASSIUM 875; 125 MG/1; MG/1
TABLET, FILM COATED ORAL
COMMUNITY

## 2024-10-18 NOTE — PROGRESS NOTES
Subjective   Shu Dimas is a 54 y.o. year old female that presents for follow-up visit for multiple sclerosis.    History of Present Illness   DISEASE SUMMARY/DIAGNOSTICS:  Onset: ~   Diagnosis of MS:   Previous disease therapies: IVMP May 2016, Copaxone  Current disease therapies: Resumed Ocrevus, resumed 2023 new induction after chemo, last infusion 2024  Most recent MRI brain: 2023 vs 2021, stable inactive  Most recent MRI cervical spine: 2019: stable, no changes compared to old scans  Most recent MRI thoracic spine: 11/15  CSF: 16, prot 50, high IgG index and synthesis rate, pos OCB  g/22 493 (stable)    Interval History:   Had surgery on the left foot in 2024. Had GI infection after that in July, followed by respiratory issues, feeling better with antibiotics. Then, while in [place], got Covid in August, cannot get rid of the cough, following with PCP, says next step is seeing pulmonary. Lately also had a bad UTI for which she had Ab for 2 weeks, has not had one in a while and thinks was due to long trip in the car. Only got 2 PT sessions b/o all of the above. Not sure if Botox is even helping for her spasticity.    Patient Active Problem List   Diagnosis    Multiple sclerosis (Multi)    Drug-induced immunodeficiency (Multi)    Atypical nevus    Otalgia    Abnormal gait    Abnormal mammogram    Anemia    Breast cancer (Multi)    Chronic back pain    Chronic neck pain    Chronic GERD    Gastroesophageal reflux disease without esophagitis    Decreased coordination    Decreased  strength of right hand    Disease of spinal cord (Multi)    Dysfunctional voiding of urine    Dysphagia    Dysuria    Eosinophilic esophagitis    Essential hypertension    History of hysterectomy    Impacted cerumen of both ears    Inclusion cyst    Lateral epicondylitis of left elbow    Lumbar radiculopathy    Lumbar stenosis    Nerve root disorder    Radiculopathy    Spinal stenosis of  cervical region    Morbid obesity (Multi)    Neurogenic bladder    Postherpetic neuralgia    Neuropathy    Sensory disturbance    Somatic dysfunction of thoracic region    Urge incontinence of urine    Vitamin B12 deficiency    Hypogammaglobulinemia, acquired (Multi)    Impaired motor control    Right leg weakness      No Known Allergies       Current Outpatient Medications:     amitriptyline (Elavil) 50 mg tablet, Take 50 mg by mouth daily at bedtime., Disp: , Rfl:     bisoproloL-hydrochlorothiazide (Ziac) 2.5-6.25 mg tablet, TAKE 1 TABLET BY MOUTH EVERY DAY IN THE MORNING, Disp: , Rfl:     bisoproloL-hydrochlorothiazide (Ziac) 5-6.25 mg tablet, TAKE 1 TABLET BY MOUTH IN THE MORNING, Disp: , Rfl:     celecoxib (CeleBREX) 200 mg capsule, Take 1 capsule (200 mg) by mouth once daily., Disp: , Rfl:     cholecalciferol (Vitamin D-3) 10 MCG (400 UNIT) tablet, Vitamin D3 10 MCG (400 UNIT) Oral Tablet  Refills: 0     Active, Disp: , Rfl:     cholecalciferol (Vitamin D-3) 50 mcg (2,000 unit) capsule, Take 3 capsules (150 mcg) by mouth once daily., Disp: , Rfl:     clotrimazole-betamethasone (Lotrisone) cream, Apply topically 2 times a day., Disp: , Rfl:     dalfampridine (Ampyra) 10 mg tablet extended release 12 hr 12 hr tablet, Take 1 tablet (10 mg) by mouth 2 times a day., Disp: 60 tablet, Rfl: 5    diphenhydrAMINE (Benadryl Allergy) 25 mg tablet, Benadryl Allergy 25 MG Oral Tablet  Refills: 0     Active, Disp: , Rfl:     famotidine (Pepcid) 40 mg tablet, Take by mouth., Disp: , Rfl:     ferrous sulfate 325 (65 Fe) MG tablet, Take 1 tablet by mouth once daily., Disp: , Rfl:     fluconazole (Diflucan) 150 mg tablet, , Disp: , Rfl:     furosemide (Lasix) 20 mg tablet, Take 1 tablet (20 mg) by mouth once daily., Disp: , Rfl:     gabapentin (Neurontin) 300 mg capsule, Take 1 capsule (300 mg) by mouth 2 times a day., Disp: , Rfl:     guaiFENesin (Mucinex) 600 mg 12 hr tablet, Take 1 tablet (600 mg) by mouth 2 times a day.,  Disp: , Rfl:     levoFLOXacin (Levaquin) 750 mg tablet, Take 1 tablet (750 mg) by mouth once daily., Disp: , Rfl:     meloxicam (Mobic) 15 mg tablet, Take 1 tablet (15 mg) by mouth once daily., Disp: , Rfl:     mirabegron (Myrbetriq) 50 mg tablet extended release 24 hr 24 hr tablet, Take 1 tablet (50 mg) by mouth once daily at bedtime., Disp: 90 tablet, Rfl: 3    mometasone (Elocon) 0.1 % cream, APPLY TOPICALLY TO RADIATION SITE ONCE DAILY AFTER RADIATION TREATMENTS, Disp: , Rfl:     multivitamin tablet, Take 1 tablet by mouth once daily., Disp: , Rfl:     ocrelizumab (Ocrevus) 30 mg/mL, Infuse 20 mL (600 mg total) into a venous catheter every 6 months., Disp: 20 mL, Rfl: 1    OLANZapine (ZyPREXA) 5 mg tablet, Take 1 tablet (5 mg) by mouth once daily at bedtime., Disp: , Rfl:     omeprazole (PriLOSEC) 20 mg DR capsule, Take 1 capsule (20 mg) by mouth once daily., Disp: , Rfl:     omeprazole (PriLOSEC) 40 mg DR capsule, TK 1 C PO D, Disp: , Rfl:     ondansetron (Zofran) 4 mg/2 mL injection, Infuse 4 mg IVPB once as needed for nausea and vomiting during Ocrevus infusion. Withdraw 4mg and add to 50 mL of normal saline. Infuse over 15 minutes., Disp: , Rfl:     oxyCODONE-acetaminophen (Percocet) 5-325 mg tablet, TAKE 1 TABLET BY MOUTH EVERY 4 TO 6 HOURS FOR 3 DAYS AS NEEDED FOR PAIN, Disp: , Rfl:     polyethylene glycol (Miralax) 17 gram/dose powder, MIX 1 CAPFUL (17GM) IN 8 OUNCES OF WATER, JUICE, OR TEA AND DRINK DAILY., Disp: , Rfl:     polymyxin B sulf-trimethoprim (Polytrim) ophthalmic solution, , Disp: , Rfl:     potassium chloride CR 10 mEq ER tablet, Take 1 tablet (10 mEq) by mouth 2 times daily (morning and late afternoon)., Disp: , Rfl:     sodium chloride (Ayr) 0.65 % nasal drops, by Nasal route, Disp: , Rfl:     tiZANidine (Zanaflex) 2 mg tablet, Take one daily (Patient not taking: Reported on 6/11/2024), Disp: 90 tablet, Rfl: 3    venlafaxine XR (Effexor-XR) 37.5 mg 24 hr capsule, Take 1 capsule at bedtime  "for 5 days then increase to 2 caps at bedtime., Disp: , Rfl:     Vitamin B-12 1,000 mcg tablet, Take 1 tablet (1,000 mcg) by mouth once daily., Disp: , Rfl:     Current Facility-Administered Medications:     onabotulinumtoxinA (Botox) injection 100 Units, 100 Units, intramuscular, Once, Bobby Noonan MD    onabotulinumtoxinA (Botox) injection 200 Units, 200 Units, intramuscular, Once, Bobby Noonan MD     Objective   /75   Pulse 103   Ht 1.676 m (5' 6\")   Wt 106 kg (234 lb)   BMI 37.77 kg/m²     Neurological Exam  Physical Exam    MENTAL STATE: Orientation was normal to person, place, situaiton, date. Recent and remote memory was intact. Attention span and concentration were normal. Language normal for comprehension, naming, repetition and expression. General fund of knowledge was intact.     CRANIAL NERVES:   CN 3, 4, 6 Pupils round, 4 mm in diameter, equally reactive to light. Lids symmetric; no ptosis. EOMs normal alignment, full range with normal saccades, pursuit and convergence.   CN 5 Facial sensation intact bilaterally to light touch  CN 7 Normal and symmetric facial strength. Nasolabial folds symmetric.   CN 8 Hearing intact to conversation.                     R L  Delt           5 5  Bicep         5 5  Tricep        5 5   Wrist Flex  5 5   Wrist Ext  5 5             5 5  Finger abd 5- 5  APB  4 5    Hip Flex      2 5-  Leg Ext      5 5  Leg Flex     4- 5  DF             2 5  PF             2 5     GAIT: Paretic gait with right foot drop. Uses a walker.     Assessment/Plan   This is a 54 y.o. year old  right handed woman with a history of primary progressive multiple sclerosis, breast cancer (s/p chemotherapy and radiation 2023), HTN, GERD, who presents follow-up appointment for multiple sclerosis.  Unfortunately, she has declined in terms of gait and weakness of the right leg likely in the setting of multiple issues, including COVID and UTI, along with the inability to attend " physical therapy.  She had one UTI in the setting of a long trip in the car, thus I think it is okay to follow with urology at this point, and her upper respiratory infections were due to common illnesses, like COVID.  Will watch closely her immunoglobulin G values and monitor for infections, before considering if she is a candidate for IgG supplementation.    Will continue ocrevus and plan on repeated an MRI brain next year in June 2025.  Counseled about timing of vaccination while on ocrelizumab.  She will resume physical therapy.    Plan:  -Continue ocrevus  -MRI brain w/wo every 2 years (next MRI will be June 2025)  - Will follow up with urology earlier and may see pulmonology  -Follow-up in 6 months    Diagnoses and all orders for this visit:  Multiple sclerosis (Multi) (Primary)  -     Follow Up In Neurology; Future

## 2024-10-22 ENCOUNTER — TREATMENT (OUTPATIENT)
Dept: PHYSICAL THERAPY | Facility: CLINIC | Age: 54
End: 2024-10-22
Payer: COMMERCIAL

## 2024-10-22 DIAGNOSIS — R25.2 SPASTICITY: ICD-10-CM

## 2024-10-22 DIAGNOSIS — R29.898 RIGHT LEG WEAKNESS: Primary | ICD-10-CM

## 2024-10-22 DIAGNOSIS — G35 MS (MULTIPLE SCLEROSIS) (MULTI): ICD-10-CM

## 2024-10-22 DIAGNOSIS — R26.9 ABNORMAL GAIT: ICD-10-CM

## 2024-10-22 PROCEDURE — 97112 NEUROMUSCULAR REEDUCATION: CPT | Mod: GP | Performed by: PHYSICAL THERAPIST

## 2024-10-22 NOTE — PROGRESS NOTES
Physical Therapy    Physical Therapy Treatment    Patient Name: Shu Dimas  MRN: [id number]  Today's Date: 10/22/2024  Time Calculation  Start Time: 0845  Stop Time: 0945  Time Calculation (min): 60 min     PT Therapeutic Procedures Time Entry  Neuromuscular Re-Education Time Entry: 60       Visit number: 2  Insurance: [place]  Plan of Care Dates: 9/17/2024 - 12/16/2024  Authorized visits: 30 PT per year ([place])  Authorization end date: 12/16/2024  Primary diagnosis: R LE weakness      Assessment:   Patient presents for first follow-up PT session this date.  Patient returns to clinic after multiple illnesses including UTI which interrupted her compliance with HEP stretches and exercises.  She reports feeling very weak and stiff.  She tolerated all therapeutic activities well with out increase in pain. Home exercises were reviewed and patient required no cues with review of home program exercises to ensure proper form and technique.  She is eager to work on new exercises and incorporate them into her routine.  Patient will continue to benefit from skilled PT to continue to improve strength, balance, gait, and overall functional mobility.    Plan: Continue per plan of care.        Current Problem  Problem List Items Addressed This Visit             ICD-10-CM    MS (multiple sclerosis) (Multi) G35    Abnormal gait R26.9    Right leg weakness - Primary R29.898    Spasticity R25.2         Subjective    Patient states she is feeling better and is eager to get going with PT.  Scheduled for Ocrevus infusion in December this year.    Precautions:   Precautions  STEADI Fall Risk Score (The score of 4 or more indicates an increased risk of falling): 5  Fall Risk    Pain       Objective  (Per PT Initial Evaluation on 9/17/2024)  Objective:  Outcome Measures:   10 meter walk = 20.44 secs  TUG 21.05 secs with rollator  1 lap around clinic 210 ft in 141 seconds and 64 right toe catches. Some toe catches are severe and she needs to  stop to recover anterior LOB with heavy reliance on rollator.     Assessment from Discharge on 4/2/24 for comparison:  Right ankle DF PROM knee extended = 3 deg, knee flexed = 7 deg.   Right ankle DF AROM = 4 deg with knee flexed (improved from 2 degrees).   No active javed DF with knee extended.   TUG = 19.75 secs with rollator (unchanged)  10 meter = 16.72 secs with rollator (3 toe catches) - improved from 19.74 secs  210 ft walk with rollator: 24 right toe catches     Posture: increased kyphosis, rounded shoulders and forward head     Gait: Ambulates with rollator: left lean and slight vaulting during right swing. LLE is adducted in stance. Reduced hip and knee flexion with R swing and unable to reach neutral dorsiflexion.   Stairs:   Transfers: Increased UE use and compensation for RLE weakness     Coordination: NT     Tone: No considerable hypertonicity was noted     Sensation: light touch intact throughout LEs     Lower Extremity Strength:  Date: eval RIGHT LEFT   Hip Flexion 2-    4+   Hip Extension 4+ 5   Hip Abduction 2 4+   Hip Adduction       Knee Extension 4+ 5   Knee Flexion 2 5   Ankle DF 2- 5   Ankle PF 4+ 5   Ankle INV 4- 5   Ankle EV 4- 5      Lower Extremity ROM:  Date: eval RIGHT LEFT   Hip Flexion       Hip Extension       Hip IR       Hip ER       Knee Extension       Knee Flexion       Ankle DF 0 deg knee extended; +4 knee flexed     Ankle PF 25 deg     Ankle INV       Ankle EV          Tight quads: 92 degrees knee flexion in prone  Outcome Measures:  None assessed this date (10/22/2024)    Treatments:  NEUROMUSCULAR REEDUCATION x 60 minutes  Recumbent LE ergometer - 5 minutes F/B, seat 7  Reviewed previous HEP with good accuracy and demonstration: 5 - 10 reps per.   - standing L LE quad stretch with L knee on chair, even and staggered stance.   - Attempted sit back on L heel for max knee flexion, but other two positions felt more effective   - standing L PF stretch with foot on wedge, heel on  floor.  Even and staggered stance.    - Added staggered stance with L knee flexion for increased soleus stretch   - seated L knee flexion/HS curl in dangle and with roll under foot    - added R knee flexion on roll for increased L knee ROM  - added R foot resistance for L knee extension, and L LAQ once roll is reaches max excursion.    - added patient manual palpation of L quad to ensure activation during LAQ.    OP EDUCATION:   Excellent review and demonstration of exercises including additions/modifications.  Patient voices understanding and intended compliance.    Goals:  Active       PT Problem       TUG       Start:  09/17/24    Expected End:  12/01/24       Improve TUG time to < 18.5 secs with rollator         10 meter walk        Start:  09/17/24    Expected End:  12/01/24       Improve 10 meter walk to < 18.0 secs         Gait       Start:  09/17/24    Expected End:  12/01/24       Patient will ambulate with greater right foot clearance, and less compensation for right swing. She will ambulate 210 ft with < 35 instances of toe drag/catching to improve safety and efficiency.          Right ankle Dorsiflexion       Start:  09/17/24    Expected End:  12/01/24       Right ankle DF PROM with knee extended > neutral, and knee flexed > 6 deg;            HEP       Start:  09/17/24    Expected End:  12/01/24       Patient will be independent with HEP to maximize her functional mobility         Patient stated goal       Start:  09/17/24    Expected End:  12/01/24       Reduce tightness and walk better

## 2024-10-29 ENCOUNTER — APPOINTMENT (OUTPATIENT)
Dept: PHYSICAL THERAPY | Facility: CLINIC | Age: 54
End: 2024-10-29
Payer: COMMERCIAL

## 2024-11-04 DIAGNOSIS — G35 MULTIPLE SCLEROSIS (MULTI): Primary | ICD-10-CM

## 2024-11-05 ENCOUNTER — APPOINTMENT (OUTPATIENT)
Dept: PHYSICAL THERAPY | Facility: CLINIC | Age: 54
End: 2024-11-05
Payer: COMMERCIAL

## 2024-11-12 ENCOUNTER — SPECIALTY PHARMACY (OUTPATIENT)
Dept: PHARMACY | Facility: CLINIC | Age: 54
End: 2024-11-12

## 2024-11-12 ENCOUNTER — TREATMENT (OUTPATIENT)
Dept: PHYSICAL THERAPY | Facility: CLINIC | Age: 54
End: 2024-11-12
Payer: COMMERCIAL

## 2024-11-12 DIAGNOSIS — R25.2 SPASTICITY: ICD-10-CM

## 2024-11-12 DIAGNOSIS — R26.9 ABNORMAL GAIT: Primary | ICD-10-CM

## 2024-11-12 DIAGNOSIS — R29.898 RIGHT LEG WEAKNESS: ICD-10-CM

## 2024-11-12 DIAGNOSIS — G35 MS (MULTIPLE SCLEROSIS) (MULTI): ICD-10-CM

## 2024-11-12 PROCEDURE — 97112 NEUROMUSCULAR REEDUCATION: CPT | Mod: GP | Performed by: PHYSICAL THERAPIST

## 2024-11-12 PROCEDURE — RXMED WILLOW AMBULATORY MEDICATION CHARGE

## 2024-11-12 NOTE — PROGRESS NOTES
Physical Therapy    Physical Therapy Treatment    Patient Name: Shu Dimas  MRN: [id number]  Today's Date: 11/12/2024  Time Calculation  Start Time: 0800  Stop Time: 0845  Time Calculation (min): 45 min     PT Therapeutic Procedures Time Entry  Neuromuscular Re-Education Time Entry: 45       Visit number: 3  Insurance: [place]  Plan of Care Dates: 9/17/2024 - 12/16/2024  Authorized visits: 30 PT per year ([place]) (previously had 4 visits in 2024).  Authorization end date: 12/16/2024  Primary diagnosis: R LE weakness      Assessment:   Patient presents for follow-up PT session this date.  Patient continues with productive cough for over 2 months.  Had PFTs last week, waiting for results.  She reports feeling about the same as usual - stiff, but no pain.  Patient has been able to work on the exercises and feels like they help her.     She tolerated all therapeutic activities well with out increase in pain, assisting with problem solving positional and activity challenges.  She is eager to work on new exercises and incorporate them into her routine.  Patient will continue to benefit from skilled PT to continue to improve strength, balance, gait, and overall functional mobility.    Plan: Continue per plan of care.        Current Problem  Problem List Items Addressed This Visit             ICD-10-CM    MS (multiple sclerosis) (Multi) G35    Abnormal gait - Primary R26.9    Right leg weakness R29.898    Spasticity R25.2         Subjective    Patient states she is feeling OK today despite productive cough.  Scheduled for Ocrevus infusion in December this year.    Precautions:   Precautions  STEADI Fall Risk Score (The score of 4 or more indicates an increased risk of falling): 5  Fall Risk    Pain   No pain today    Objective  (Per PT Initial Evaluation on 9/17/2024)  Outcome Measures:   10 meter walk = 20.44 secs  TUG 21.05 secs with rollator  1 lap around clinic 210 ft in 141 seconds and 64 right toe catches. Some toe  catches are severe and she needs to stop to recover anterior LOB with heavy reliance on rollator.     Assessment from Discharge on 4/2/24 for comparison:  Right ankle DF PROM knee extended = 3 deg, knee flexed = 7 deg.   Right ankle DF AROM = 4 deg with knee flexed (improved from 2 degrees).   No active javed DF with knee extended.   TUG = 19.75 secs with rollator (unchanged)  10 meter = 16.72 secs with rollator (3 toe catches) - improved from 19.74 secs  210 ft walk with rollator: 24 right toe catches     Posture: increased kyphosis, rounded shoulders and forward head     Gait: Ambulates with rollator: left lean and slight vaulting during right swing. LLE is adducted in stance. Reduced hip and knee flexion with R swing and unable to reach neutral dorsiflexion.   Stairs:   Transfers: Increased UE use and compensation for RLE weakness     Coordination: NT     Tone: No considerable hypertonicity was noted     Sensation: light touch intact throughout LEs     Lower Extremity Strength:  Date: eval RIGHT LEFT   Hip Flexion 2-    4+   Hip Extension 4+ 5   Hip Abduction 2 4+   Hip Adduction       Knee Extension 4+ 5   Knee Flexion 2 5   Ankle DF 2- 5   Ankle PF 4+ 5   Ankle INV 4- 5   Ankle EV 4- 5      Lower Extremity ROM:  Date: eval RIGHT LEFT   Hip Flexion       Hip Extension       Hip IR       Hip ER       Knee Extension       Knee Flexion       Ankle DF 0 deg knee extended; +4 knee flexed     Ankle PF 25 deg     Ankle INV       Ankle EV          Tight quads: 92 degrees knee flexion in prone  Outcome Measures:  None assessed this date (10/22/2024)    Treatments:  NEUROMUSCULAR REEDUCATION x 45 minutes  Recumbent LE ergometer - 5 minutes F/B, seat 6  Reviewed previous HEP with good accuracy and demonstration: 5 - 10 reps per.   - standing L LE quad stretch with L knee on chair, even and staggered stance.   - Added stretch strap at foot to assist with knee flexion from staggered stance position.   - standing L PF stretch  with foot on wedge, heel on floor.  Even and staggered stance.    - Added staggered stance with L knee flexion for increased soleus stretch   - seated L knee flexion/HS curl in dangle and with roll under foot  - added L LAQ/SLR once roll reaches max distance from seat  Gait exercise with rolling walker  - exaggerated HF with KF (march) followed by L step through 20'x2.   - Add to HEP, 10' at a time    OP EDUCATION:   Excellent review and demonstration of exercises including additions/modifications.  Patient voices understanding and intended compliance.    Goals:  Active       PT Problem       TUG       Start:  09/17/24    Expected End:  12/01/24       Improve TUG time to < 18.5 secs with rollator         10 meter walk        Start:  09/17/24    Expected End:  12/01/24       Improve 10 meter walk to < 18.0 secs         Gait       Start:  09/17/24    Expected End:  12/01/24       Patient will ambulate with greater right foot clearance, and less compensation for right swing. She will ambulate 210 ft with < 35 instances of toe drag/catching to improve safety and efficiency.          Right ankle Dorsiflexion       Start:  09/17/24    Expected End:  12/01/24       Right ankle DF PROM with knee extended > neutral, and knee flexed > 6 deg;            HEP       Start:  09/17/24    Expected End:  12/01/24       Patient will be independent with HEP to maximize her functional mobility         Patient stated goal       Start:  09/17/24    Expected End:  12/01/24       Reduce tightness and walk better

## 2024-11-14 ENCOUNTER — PHARMACY VISIT (OUTPATIENT)
Dept: PHARMACY | Facility: CLINIC | Age: 54
End: 2024-11-14
Payer: COMMERCIAL

## 2024-11-19 ENCOUNTER — TREATMENT (OUTPATIENT)
Dept: PHYSICAL THERAPY | Facility: CLINIC | Age: 54
End: 2024-11-19
Payer: COMMERCIAL

## 2024-11-19 DIAGNOSIS — G35 MS (MULTIPLE SCLEROSIS) (MULTI): ICD-10-CM

## 2024-11-19 DIAGNOSIS — R26.9 ABNORMAL GAIT: Primary | ICD-10-CM

## 2024-11-19 DIAGNOSIS — R25.2 SPASTICITY: ICD-10-CM

## 2024-11-19 DIAGNOSIS — R29.898 RIGHT LEG WEAKNESS: ICD-10-CM

## 2024-11-19 PROCEDURE — 97112 NEUROMUSCULAR REEDUCATION: CPT | Mod: GP | Performed by: PHYSICAL THERAPIST

## 2024-11-19 NOTE — PROGRESS NOTES
Physical Therapy    Physical Therapy Treatment    Patient Name: Shu Dimas  MRN: [id number]  Today's Date: 11/19/2024  Time Calculation  Start Time: 0800  Stop Time: 0845  Time Calculation (min): 45 min     PT Therapeutic Procedures Time Entry  Neuromuscular Re-Education Time Entry: 45       Visit number: 4  Insurance: [place]  Plan of Care Dates: 9/17/2024 - 12/16/2024  Authorized visits: 30 PT per year ([place]) (previously had 4 visits in 2024).  Authorization end date: 12/16/2024  Primary diagnosis: R LE weakness      Assessment:   Patient presents for follow-up PT session this date.  Patient continues with productive cough for over 2 months.  Had PFTs last week.  Hoping for results at doctor's appointment later today.  She reports feeling about the same as usual - stiff, but no pain.  Patient has been able to work on the exercises and feels like they help her.     She tolerated all therapeutic activities well with out increase in pain, assisting with problem solving positional and activity challenges.  She is eager to work on new exercises and incorporate them into her routine.  Patient will continue to benefit from skilled PT to continue to improve strength, balance, gait, and overall functional mobility.    Plan: Continue per plan of care.        Current Problem  Problem List Items Addressed This Visit             ICD-10-CM    MS (multiple sclerosis) (Multi) G35    Abnormal gait - Primary R26.9    Right leg weakness R29.898    Spasticity R25.2         Subjective    Patient states she is feeling OK today despite productive cough.  Scheduled for Ocrevus infusion in December this year.    Precautions:   Precautions  STEADI Fall Risk Score (The score of 4 or more indicates an increased risk of falling): 5  Moderate Fall Risk based on LE stiffness, weakness, impaired gait and coordination.  Significant falls history.    Pain   No pain today    Objective  (Per PT Initial Evaluation on 9/17/2024)  Outcome Measures:   10  meter walk = 20.44 secs  TUG 21.05 secs with rollator  1 lap around clinic 210 ft in 141 seconds and 64 right toe catches. Some toe catches are severe and she needs to stop to recover anterior LOB with heavy reliance on rollator.     Assessment from Discharge on 4/2/24 for comparison:  Right ankle DF PROM knee extended = 3 deg, knee flexed = 7 deg.   Right ankle DF AROM = 4 deg with knee flexed (improved from 2 degrees).   No active javed DF with knee extended.   TUG = 19.75 secs with rollator (unchanged)  10 meter = 16.72 secs with rollator (3 toe catches) - improved from 19.74 secs  210 ft walk with rollator: 24 right toe catches     Posture: increased kyphosis, rounded shoulders and forward head     Gait: Ambulates with rollator: left lean and slight vaulting during right swing. LLE is adducted in stance. Reduced hip and knee flexion with R swing and unable to reach neutral dorsiflexion.   Stairs:   Transfers: Increased UE use and compensation for RLE weakness     Coordination: NT     Tone: No considerable hypertonicity was noted     Sensation: light touch intact throughout LEs     Lower Extremity Strength:  Date: eval RIGHT LEFT   Hip Flexion 2-    4+   Hip Extension 4+ 5   Hip Abduction 2 4+   Hip Adduction       Knee Extension 4+ 5   Knee Flexion 2 5   Ankle DF 2- 5   Ankle PF 4+ 5   Ankle INV 4- 5   Ankle EV 4- 5      Lower Extremity ROM:  Date: eval RIGHT LEFT   Hip Flexion       Hip Extension       Hip IR       Hip ER       Knee Extension       Knee Flexion       Ankle DF 0 deg knee extended; +4 knee flexed     Ankle PF 25 deg     Ankle INV       Ankle EV          Tight quads: 92 degrees knee flexion in prone  Outcome Measures:  None assessed this date (10/22/2024)    Treatments:  NEUROMUSCULAR REEDUCATION x 45 minutes  Recumbent LE ergometer - 5 minutes F/B, seat 7    Reviewed previous HEP with good accuracy and demonstration: 5 - 10 reps per.   - standing L LE quad stretch with L knee on chair, even and  staggered stance.   - as above with stretch strap at foot to assist with knee flexion from staggered stance position.   - standing L PF stretch with foot on wedge, heel on floor.  Even and staggered stance.   - as above in staggered stance with L knee flexion for increased soleus stretch   - seated L knee flexion/HS curl in dangle and with roll under foot   - seated L LAQ/SLR once roll reaches max distance from seat  Gait exercise with rolling walker  - exaggerated HF with KF (march) followed by L step through 20'x2.   - Add to HEP, 10' at a time.  Gait with rolling walker - 200'x1 with rare R foot/toe drag.  Utilizing increased R hip flexion and momentum to perform r swing with better foot clearance and improved efficiency.    OP EDUCATION:   Excellent review and demonstration of exercises including additions/modifications.  Patient voices understanding and intended compliance.  Add in stationary bike riding at home at least 3x a week for 10-15 minutes.    Goals:  Active       PT Problem       TUG       Start:  09/17/24    Expected End:  12/01/24       Improve TUG time to < 18.5 secs with rollator         10 meter walk        Start:  09/17/24    Expected End:  12/01/24       Improve 10 meter walk to < 18.0 secs         Gait       Start:  09/17/24    Expected End:  12/01/24       Patient will ambulate with greater right foot clearance, and less compensation for right swing. She will ambulate 210 ft with < 35 instances of toe drag/catching to improve safety and efficiency.          Right ankle Dorsiflexion       Start:  09/17/24    Expected End:  12/01/24       Right ankle DF PROM with knee extended > neutral, and knee flexed > 6 deg;            HEP       Start:  09/17/24    Expected End:  12/01/24       Patient will be independent with HEP to maximize her functional mobility         Patient stated goal       Start:  09/17/24    Expected End:  12/01/24       Reduce tightness and walk better

## 2024-11-26 ENCOUNTER — APPOINTMENT (OUTPATIENT)
Dept: PHYSICAL THERAPY | Facility: CLINIC | Age: 54
End: 2024-11-26
Payer: COMMERCIAL

## 2024-12-05 ENCOUNTER — APPOINTMENT (OUTPATIENT)
Dept: PHYSICAL THERAPY | Facility: CLINIC | Age: 54
End: 2024-12-05
Payer: COMMERCIAL

## 2024-12-10 ENCOUNTER — TREATMENT (OUTPATIENT)
Dept: PHYSICAL THERAPY | Facility: CLINIC | Age: 54
End: 2024-12-10
Payer: COMMERCIAL

## 2024-12-10 DIAGNOSIS — G35 MS (MULTIPLE SCLEROSIS) (MULTI): ICD-10-CM

## 2024-12-10 DIAGNOSIS — R25.2 SPASTICITY: ICD-10-CM

## 2024-12-10 DIAGNOSIS — R26.9 ABNORMAL GAIT: Primary | ICD-10-CM

## 2024-12-10 DIAGNOSIS — R29.898 RIGHT LEG WEAKNESS: ICD-10-CM

## 2024-12-10 PROCEDURE — 97116 GAIT TRAINING THERAPY: CPT | Mod: GP | Performed by: PHYSICAL THERAPIST

## 2024-12-10 PROCEDURE — 97112 NEUROMUSCULAR REEDUCATION: CPT | Mod: GP | Performed by: PHYSICAL THERAPIST

## 2024-12-10 NOTE — PROGRESS NOTES
Physical Therapy    Physical Therapy Treatment    Patient Name: Shu Dimas  MRN: [id number]  Today's Date: 12/10/2024  Time Calculation  Start Time: 1105  Stop Time: 1205  Time Calculation (min): 60 min     PT Therapeutic Procedures Time Entry  Neuromuscular Re-Education Time Entry: 45  Gait Training Time Entry: 15       Visit number: 5  Insurance: [place]  Plan of Care Dates: 9/17/2024 - 12/16/2024  [place]ized visits[place] ([place] (previously had 4 visits in 2024).  Authorization end date: 12/16/2024  Primary diagnosis: R LE weakness    Assessment:   Patient presents for follow-up PT session this date.  Patient continues with productive cough for over 2 months.  Had PFTs, Chest CT recently and is waiting for follow up with physician.  NO improvement in cough.  Hoping for results at doctor's appointment later today.  She reports feeling about the same as usual - stiff, but no pain.  Patient has been able to work on the exercises and feels like they help her.  Patient demonstrates improving recall and compliance with HEP, improved safety and independence during gait with rolling walker by walking 210 feet with only 2 R toe catches (64 toe catches on Eval in 9/2024).    Patient tolerated all therapeutic activities well with out increase in pain, assisting with problem solving positional and activity challenges.  She is eager to work on new exercises and incorporate them into her routine.  Patient will continue to benefit from skilled PT to continue to improve strength, balance, gait, and overall functional mobility.    Plan: Continue per plan of care.        Current Problem  Problem List Items Addressed This Visit             ICD-10-CM    MS (multiple sclerosis) (Multi) G35    Abnormal gait - Primary R26.9    Right leg weakness R29.898    Spasticity R25.2         Subjective    Patient states she is feeling OK today despite productive cough.  Do you think the botox injections have helped me?  Precautions:    Precautions  STEADI Fall Risk Score (The score of 4 or more indicates an increased risk of falling): 5  Moderate Fall Risk based on LE stiffness, weakness, impaired gait and coordination.  Significant falls history.    Pain   No pain today    Objective  (Per PT Initial Evaluation on 9/17/2024)  Outcome Measures:   10 meter walk = 20.44 secs  TUG 21.05 secs with rollator  1 lap around clinic 210 ft in 141 seconds and 64 right toe catches. Some toe catches are severe and she needs to stop to recover anterior LOB with heavy reliance on rollator.     Assessment from Discharge on 4/2/24 for comparison:  Right ankle DF PROM knee extended = 3 deg, knee flexed = 7 deg.   Right ankle DF AROM = 4 deg with knee flexed (improved from 2 degrees).   No active javed DF with knee extended.   TUG = 19.75 secs with rollator (unchanged)  10 meter = 16.72 secs with rollator (3 toe catches) - improved from 19.74 secs  210 ft walk with rollator: 24 right toe catches     Posture: increased kyphosis, rounded shoulders and forward head     Gait: Ambulates with rollator: left lean and slight vaulting during right swing. LLE is adducted in stance. Reduced hip and knee flexion with R swing and unable to reach neutral dorsiflexion.   Stairs:   Transfers: Increased UE use and compensation for RLE weakness     Coordination: NT     Tone: No considerable hypertonicity was noted     Sensation: light touch intact throughout LEs     Lower Extremity Strength:  Date: eval RIGHT LEFT   Hip Flexion 2-    4+   Hip Extension 4+ 5   Hip Abduction 2 4+   Hip Adduction       Knee Extension 4+ 5   Knee Flexion 2 5   Ankle DF 2- 5   Ankle PF 4+ 5   Ankle INV 4- 5   Ankle EV 4- 5      Lower Extremity ROM:  Date: eval RIGHT LEFT   Hip Flexion       Hip Extension       Hip IR       Hip ER       Knee Extension       Knee Flexion       Ankle DF 0 deg knee extended; +4 knee flexed     Ankle PF 25 deg     Ankle INV       Ankle EV          Tight quads: 92 degrees knee  flexion in prone  Outcome Measures:  6MWT - 544' with rolling walker     Treatments:  NEUROMUSCULAR REEDUCATION x 45 minutes  Recumbent LE ergometer - 5 minutes F/B, seat 7  Reviewed previous HEP with good accuracy and demonstration: 5 - 10 reps per.   - standing L LE quad stretch with L knee on chair, even and staggered stance.   - standing L PF stretch with foot on wedge, heel on floor.  Even and staggered stance.   - as above in staggered stance with L knee flexion for increased soleus stretch   - seated L knee flexion/HS curl in dangle and with roll under foot   - seated L LAQ/SLR once roll reaches max distance from seat    GAIT TRAINING - 15 MINUTES  Gait with rolling walker - 200'x1 with rare R foot/toe drag (2X ').  Utilizing increased R hip flexion and momentum to perform r swing with better foot clearance and improved efficiency.  6MWT - 544' with rolling walker and supervision.    OP EDUCATION:   Excellent review and demonstration of exercises including additions/modifications.  Patient voices understanding and intended compliance.  Add in stationary bike riding at home at least 3x a week for 10-15 minutes.    Goals:  Active       PT Problem       TUG       Start:  09/17/24    Expected End:  12/01/24       Improve TUG time to < 18.5 secs with rollator         10 meter walk        Start:  09/17/24    Expected End:  12/01/24       Improve 10 meter walk to < 18.0 secs         Gait       Start:  09/17/24    Expected End:  12/01/24       Patient will ambulate with greater right foot clearance, and less compensation for right swing. She will ambulate 210 ft with < 35 instances of toe drag/catching to improve safety and efficiency.          Right ankle Dorsiflexion       Start:  09/17/24    Expected End:  12/01/24       Right ankle DF PROM with knee extended > neutral, and knee flexed > 6 deg;            HEP       Start:  09/17/24    Expected End:  12/01/24       Patient will be independent with HEP to  maximize her functional mobility         Patient stated goal       Start:  09/17/24    Expected End:  12/01/24       Reduce tightness and walk better

## 2024-12-12 ENCOUNTER — APPOINTMENT (OUTPATIENT)
Dept: INFUSION THERAPY | Facility: CLINIC | Age: 54
End: 2024-12-12
Payer: COMMERCIAL

## 2024-12-12 PROCEDURE — RXMED WILLOW AMBULATORY MEDICATION CHARGE

## 2024-12-16 ENCOUNTER — PHARMACY VISIT (OUTPATIENT)
Dept: PHARMACY | Facility: CLINIC | Age: 54
End: 2024-12-16
Payer: COMMERCIAL

## 2024-12-17 ENCOUNTER — APPOINTMENT (OUTPATIENT)
Dept: PHYSICAL THERAPY | Facility: CLINIC | Age: 54
End: 2024-12-17
Payer: COMMERCIAL

## 2024-12-23 ENCOUNTER — APPOINTMENT (OUTPATIENT)
Dept: PHYSICAL THERAPY | Facility: CLINIC | Age: 54
End: 2024-12-23
Payer: COMMERCIAL

## 2024-12-31 ENCOUNTER — TREATMENT (OUTPATIENT)
Dept: PHYSICAL THERAPY | Facility: CLINIC | Age: 54
End: 2024-12-31
Payer: COMMERCIAL

## 2024-12-31 DIAGNOSIS — G35 MS (MULTIPLE SCLEROSIS) (MULTI): ICD-10-CM

## 2024-12-31 DIAGNOSIS — R26.9 ABNORMAL GAIT: ICD-10-CM

## 2024-12-31 DIAGNOSIS — R29.898 RIGHT LEG WEAKNESS: ICD-10-CM

## 2024-12-31 DIAGNOSIS — R25.2 SPASTICITY: ICD-10-CM

## 2024-12-31 PROCEDURE — 97112 NEUROMUSCULAR REEDUCATION: CPT | Mod: GP | Performed by: PHYSICAL THERAPIST

## 2024-12-31 PROCEDURE — 97116 GAIT TRAINING THERAPY: CPT | Mod: GP | Performed by: PHYSICAL THERAPIST

## 2024-12-31 NOTE — PROGRESS NOTES
Physical Therapy    Physical Therapy Treatment    Patient Name: Shu Dimas  MRN: [id number]  Today's Date: 12/31/2024  Time Calculation  Start Time: 1105  Stop Time: 1150  Time Calculation (min): 45 min     PT Therapeutic Procedures Time Entry  Neuromuscular Re-Education Time Entry: 30  Gait Training Time Entry: 15       Visit number: 6  Insurance: [place]  Plan of Care Dates: 9/17/2024 - 12/16/2024; 12/17/2024 - 3/20/2025  [place]ized visits[place] ([place] (previously had 4 visits in 2024).  Primary diagnosis: R LE weakness    Assessment:   Patient presents for follow-up PT session this date.  Patient continues with productive cough for over 3 months.   She reports feeling about the same as usual - stiff, but no pain.  Patient feels like they the exercises help her.  And that she is walking better lately.  Patient demonstrates improving recall and compliance with HEP, improved safety and independence during gait with rolling walker by walking 210 feet with only 2 R toe catches (64 toe catches on Eval in 9/2024).    Patient tolerated all therapeutic activities well with out increase in pain, assisting with problem solving positional and activity challenges.  She is eager to work on new exercises and incorporate them into her routine.  Patient will continue to benefit from skilled PT to continue to improve strength, balance, gait, and overall functional mobility.    Plan: Continue per plan of care.        Current Problem  Problem List Items Addressed This Visit             ICD-10-CM    MS (multiple sclerosis) (Multi) G35    Abnormal gait R26.9    Right leg weakness R29.898    Spasticity R25.2         Subjective    Patient states she is feeling OK, but didn't get to work on stretches while traveling for holidays last week.     Precautions:   Precautions  STEADI Fall Risk Score (The score of 4 or more indicates an increased risk of falling): 5  Moderate Fall Risk based on LE stiffness, weakness, impaired gait and  coordination.  Significant falls history.    Pain   No pain today    Objective  (Per PT Initial Evaluation on 9/17/2024)  Outcome Measures:   10 meter walk = 20.44 secs  TUG 21.05 secs with rollator  1 lap around clinic 210 ft in 141 seconds and 64 right toe catches. Some toe catches are severe and she needs to stop to recover anterior LOB with heavy reliance on rollator.     Assessment from Discharge on 4/2/24 for comparison:  Right ankle DF PROM knee extended = 3 deg, knee flexed = 7 deg.   Right ankle DF AROM = 4 deg with knee flexed (improved from 2 degrees).   No active javed DF with knee extended.   TUG = 19.75 secs with rollator (unchanged)  10 meter = 16.72 secs with rollator (3 toe catches) - improved from 19.74 secs  210 ft walk with rollator: 24 right toe catches     Posture: increased kyphosis, rounded shoulders and forward head     Gait: Ambulates with rollator: left lean and slight vaulting during right swing. LLE is adducted in stance. Reduced hip and knee flexion with R swing and unable to reach neutral dorsiflexion.   Stairs:   Transfers: Increased UE use and compensation for RLE weakness     Coordination: NT     Tone: No considerable hypertonicity was noted     Sensation: light touch intact throughout LEs     Lower Extremity Strength:  Date: eval RIGHT LEFT   Hip Flexion 2-    4+   Hip Extension 4+ 5   Hip Abduction 2 4+   Hip Adduction       Knee Extension 4+ 5   Knee Flexion 2 5   Ankle DF 2- 5   Ankle PF 4+ 5   Ankle INV 4- 5   Ankle EV 4- 5      Lower Extremity ROM:  Date: eval RIGHT LEFT   Hip Flexion       Hip Extension       Hip IR       Hip ER       Knee Extension       Knee Flexion       Ankle DF 0 deg knee extended; +4 knee flexed     Ankle PF 25 deg     Ankle INV       Ankle EV          Tight quads: 92 degrees knee flexion in prone  Outcome Measures:  6MWT - 544' with rolling walker     Treatments:  NEUROMUSCULAR REEDUCATION x 30 minutes  Recumbent stepper- 10 minutes, LEs only, L 3.0  for last 5 minutes  Reviewed previous HEP with good accuracy and demonstration: 5 - 10 reps per.   - standing L LE quad stretch with L/R knee on chair, even and staggered stance.   - standing L/R PF stretch with foot on wedge, heel on floor.  Even and staggered stance.   - as above in staggered stance with L/R knee flexion for increased soleus stretch   - seated L knee flexion/HS curl in dangle and with roll under foot   - seated L LAQ/SLR once roll reaches max distance from seat R active, R Active assisted    GAIT TRAINING - 15 MINUTES  Patient entered and exited clinic ambulating with rolling walker and supervision 50' x 2.   Gait with rolling walker - 200'x1 with rare R foot/toe drag (1X ').  Utilizing increased R hip flexion and momentum to perform r swing with better foot clearance and improved efficiency.      OP EDUCATION:   Excellent review and demonstration of exercises including additions/modifications.  Patient voices understanding and intended compliance.  Add in stationary bike riding at home at least 3x a week for 10-15 minutes.    Goals:  Active       PT Problem       TUG       Start:  09/17/24    Expected End:  12/01/24       Improve TUG time to < 18.5 secs with rollator         10 meter walk        Start:  09/17/24    Expected End:  12/01/24       Improve 10 meter walk to < 18.0 secs         Gait       Start:  09/17/24    Expected End:  12/01/24       Patient will ambulate with greater right foot clearance, and less compensation for right swing. She will ambulate 210 ft with < 35 instances of toe drag/catching to improve safety and efficiency.          Right ankle Dorsiflexion       Start:  09/17/24    Expected End:  12/01/24       Right ankle DF PROM with knee extended > neutral, and knee flexed > 6 deg;            HEP       Start:  09/17/24    Expected End:  12/01/24       Patient will be independent with HEP to maximize her functional mobility         Patient stated goal       Start:   09/17/24    Expected End:  12/01/24       Reduce tightness and walk better

## 2025-01-07 ENCOUNTER — TREATMENT (OUTPATIENT)
Dept: PHYSICAL THERAPY | Facility: CLINIC | Age: 55
End: 2025-01-07
Payer: COMMERCIAL

## 2025-01-07 DIAGNOSIS — R26.9 ABNORMAL GAIT: ICD-10-CM

## 2025-01-07 DIAGNOSIS — G35 MS (MULTIPLE SCLEROSIS) (MULTI): ICD-10-CM

## 2025-01-07 DIAGNOSIS — R25.2 SPASTICITY: ICD-10-CM

## 2025-01-07 DIAGNOSIS — R29.898 RIGHT LEG WEAKNESS: Primary | ICD-10-CM

## 2025-01-07 PROCEDURE — 97112 NEUROMUSCULAR REEDUCATION: CPT | Mod: GP | Performed by: PHYSICAL THERAPIST

## 2025-01-07 NOTE — PROGRESS NOTES
Physical Therapy    Physical Therapy Treatment    Patient Name: Shu Dimas  MRN: [id number]  Today's Date: 1/7/2025  Time Calculation  Start Time: 1105  Stop Time: 1200  Time Calculation (min): 55 min     PT Therapeutic Procedures Time Entry  Neuromuscular Re-Education Time Entry: 55       Visit number: EMILY in 2025 (7 total)  Insurance: [place]  Plan of Care Dates: 9/17/2024 - 12/16/2024; 12/17/2024 - 3/20/2025  [place]ized visits[place] ([place] (previously had 4 visits in 2024).  Primary diagnosis: R LE weakness    Assessment:   Patient presents for follow-up PT session this date.  She reports feeling about the same as usual - stiff, but no pain.  Patient feels like the exercises help her and that she is walking better lately.  Patient demonstrates improving recall and compliance with HEP.    Patient tolerated all therapeutic activities well with out increase in pain, assisting with problem solving positional and activity challenges.  She is eager to work on new exercises and incorporate them into her routine.  Patient will continue to benefit from skilled PT to continue to improve strength, balance, gait, and overall functional mobility.    Plan: Continue per plan of care.        Current Problem  Problem List Items Addressed This Visit             ICD-10-CM    MS (multiple sclerosis) (Multi) G35    Abnormal gait R26.9    Right leg weakness - Primary R29.898    Spasticity R25.2           Subjective    Patient states she is feeling OK.  No complaints    Precautions:   Precautions  STEADI Fall Risk Score (The score of 4 or more indicates an increased risk of falling): 5  Moderate Fall Risk based on LE stiffness, weakness, impaired gait and coordination.  Significant falls history.    Pain   No pain today    Objective  (Per PT Initial Evaluation on 9/17/2024)  Outcome Measures:   10 meter walk = 20.44 secs  TUG 21.05 secs with rollator  1 lap around clinic 210 ft in 141 seconds and 64 right toe catches. Some toe  catches are severe and she needs to stop to recover anterior LOB with heavy reliance on rollator.     Assessment from Discharge on 4/2/24 for comparison:  Right ankle DF PROM knee extended = 3 deg, knee flexed = 7 deg.   Right ankle DF AROM = 4 deg with knee flexed (improved from 2 degrees).   No active javed DF with knee extended.   TUG = 19.75 secs with rollator (unchanged)  10 meter = 16.72 secs with rollator (3 toe catches) - improved from 19.74 secs  210 ft walk with rollator: 24 right toe catches     Posture: increased kyphosis, rounded shoulders and forward head     Gait: Ambulates with rollator: left lean and slight vaulting during right swing. LLE is adducted in stance. Reduced hip and knee flexion with R swing and unable to reach neutral dorsiflexion.   Stairs:   Transfers: Increased UE use and compensation for RLE weakness     Coordination: NT     Tone: No considerable hypertonicity was noted     Sensation: light touch intact throughout LEs     Lower Extremity Strength:  Date: eval RIGHT LEFT   Hip Flexion 2-    4+   Hip Extension 4+ 5   Hip Abduction 2 4+   Hip Adduction       Knee Extension 4+ 5   Knee Flexion 2 5   Ankle DF 2- 5   Ankle PF 4+ 5   Ankle INV 4- 5   Ankle EV 4- 5      Lower Extremity ROM:  Date: eval RIGHT LEFT   Hip Flexion       Hip Extension       Hip IR       Hip ER       Knee Extension       Knee Flexion       Ankle DF 0 deg knee extended; +4 knee flexed     Ankle PF 25 deg     Ankle INV       Ankle EV          Tight quads: 92 degrees knee flexion in prone  Outcome Measures:  6MWT - 544' with rolling walker     Treatments:  NEUROMUSCULAR REEDUCATION x 55 minutes  Recumbent stepper- 8 minutes, LEs only, L 3.0   Reviewed previous HEP with good accuracy and demonstration: 5 - 10 reps per.   - standing L LE quad stretch with L/R knee on chair, even and staggered stance.   - standing L/R PF stretch with foot on wedge, heel on floor.  Even and staggered stance.   - standing with B UE  support and feet flat on the floor in even stance with B knee flexion for increased soleus stretch   - seated L knee flexion/HS curl in dangle and with roll under foot   - seated L LAQ/SLR once roll reaches max distance from seat R active, R Active assisted  Gait exercises in parallel bars:   - Side stepping with cued R knee flexion prior to attempted lift/motion. - 10'x4 R/L   - lunge walk - 10' x 6   - walk with exaggerated hip/knee flexion - 10'x6    - retro walk with emphasis on heel lift and knee flexion prior to step back - 10'x4.      OP EDUCATION:   Excellent review and demonstration of exercises including additions/modifications.  Patient voices understanding and intended compliance.  Add in stationary bike riding at home at least 3x a week for 10-15 minutes.    Goals:  Active       PT Problem       TUG       Start:  09/17/24    Expected End:  12/01/24       Improve TUG time to < 18.5 secs with rollator         10 meter walk        Start:  09/17/24    Expected End:  12/01/24       Improve 10 meter walk to < 18.0 secs         Gait       Start:  09/17/24    Expected End:  12/01/24       Patient will ambulate with greater right foot clearance, and less compensation for right swing. She will ambulate 210 ft with < 35 instances of toe drag/catching to improve safety and efficiency.          Right ankle Dorsiflexion       Start:  09/17/24    Expected End:  12/01/24       Right ankle DF PROM with knee extended > neutral, and knee flexed > 6 deg;            HEP       Start:  09/17/24    Expected End:  12/01/24       Patient will be independent with HEP to maximize her functional mobility         Patient stated goal       Start:  09/17/24    Expected End:  12/01/24       Reduce tightness and walk better

## 2025-01-08 PROCEDURE — RXMED WILLOW AMBULATORY MEDICATION CHARGE

## 2025-01-09 ENCOUNTER — PHARMACY VISIT (OUTPATIENT)
Dept: PHARMACY | Facility: CLINIC | Age: 55
End: 2025-01-09
Payer: COMMERCIAL

## 2025-01-14 ENCOUNTER — TREATMENT (OUTPATIENT)
Dept: PHYSICAL THERAPY | Facility: CLINIC | Age: 55
End: 2025-01-14
Payer: COMMERCIAL

## 2025-01-14 DIAGNOSIS — R29.898 RIGHT LEG WEAKNESS: Primary | ICD-10-CM

## 2025-01-14 DIAGNOSIS — R25.2 SPASTICITY: ICD-10-CM

## 2025-01-14 DIAGNOSIS — G35 MS (MULTIPLE SCLEROSIS) (MULTI): ICD-10-CM

## 2025-01-14 DIAGNOSIS — R26.9 ABNORMAL GAIT: ICD-10-CM

## 2025-01-14 PROCEDURE — 97112 NEUROMUSCULAR REEDUCATION: CPT | Mod: GP | Performed by: PHYSICAL THERAPIST

## 2025-01-14 NOTE — PROGRESS NOTES
Physical Therapy    Physical Therapy Treatment    Patient Name: Shu Dimas  MRN: [id number]  Today's Date: 1/14/2025  Time Calculation  Start Time: 1400  Stop Time: 1455  Time Calculation (min): 55 min     PT Therapeutic Procedures Time Entry  Neuromuscular Re-Education Time Entry: 55       Visit number: 2 in 2025 (8 total)  Insurance: [employer]  Plan of Care Dates: 9/17/2024 - 12/16/2024; 12/17/2024 - 3/20/2025  Authorized visits: 30 PT per year ([place]) (previously had 4 visits in 2024).  Primary diagnosis: R LE weakness    Assessment:   Patient presents for follow-up PT session this date.  She reports feeling about the same as usual - stiff, but no pain.  Patient feels like the exercises help her and that she is walking better lately.  Patient demonstrates improving recall and compliance with HEP.    Patient tolerated all therapeutic activities well with out increase in pain.  She is eager to work on new exercises and incorporate them into her routine.  Patient will continue to benefit from skilled PT to continue to improve strength, balance, gait, and overall functional mobility.    Plan: Continue per plan of care.        Current Problem  Problem List Items Addressed This Visit             ICD-10-CM    MS (multiple sclerosis) (Multi) G35    Abnormal gait R26.9    Right leg weakness - Primary R29.898    Spasticity R25.2           Subjective    Patient states she is feeling OK.  No complaints.  Ready for chest/lung CT this week and meeting with Pulmonologist early next week.    Precautions:   Precautions  STEADI Fall Risk Score (The score of 4 or more indicates an increased risk of falling): 5  Moderate Fall Risk based on LE stiffness, weakness, impaired gait and coordination.  Significant falls history.    Pain   No pain today    Objective  (Per PT Initial Evaluation on 9/17/2024)  Outcome Measures:   10 meter walk = 20.44 secs  TUG 21.05 secs with rollator  1 lap around clinic 210 ft in 141 seconds and 64 right toe  catches. Some toe catches are severe and she needs to stop to recover anterior LOB with heavy reliance on rollator.     Posture: increased kyphosis, rounded shoulders and forward head     Gait: Ambulates with rollator: left lean and slight vaulting during right swing. LLE is adducted in stance. Reduced hip and knee flexion with R swing and unable to reach neutral dorsiflexion.   Stairs:   Transfers: Increased UE use and compensation for RLE weakness     Coordination: NT     Tone: No considerable hypertonicity was noted     Sensation: light touch intact throughout LEs     Lower Extremity Strength:  Date: eval RIGHT LEFT   Hip Flexion 2-    4+   Hip Extension 4+ 5   Hip Abduction 2 4+   Hip Adduction       Knee Extension 4+ 5   Knee Flexion 2 5   Ankle DF 2- 5   Ankle PF 4+ 5   Ankle INV 4- 5   Ankle EV 4- 5      Lower Extremity ROM:  Date: eval RIGHT LEFT   Hip Flexion       Hip Extension       Hip IR       Hip ER       Knee Extension       Knee Flexion       Ankle DF 0 deg knee extended; +4 knee flexed     Ankle PF 25 deg     Ankle INV       Ankle EV          Tight quads: 92 degrees knee flexion in prone  Outcome Measures:  6MWT - 544' with rolling walker     Treatments:  NEUROMUSCULAR REEDUCATION x 55 minutes  Patient entered and exited clinic ambulating with rolling walker and supervision 50' x 2.   Recumbent stepper- 8 minutes, LEs only, L 3.0   Reviewed previous HEP with good accuracy and demonstration: 5 - 10 reps per.   - standing L LE quad stretch with L/R knee on chair, even and staggered stance.   - seated L knee flexion/HS curl in dangle    - seated L LAQ/SLR once roll reaches max distance from seat R active, R Active assisted  Standing mini squats at counter with mimicked open cabinet door for improved knee clearance.   Supine SAQ, assisted heel slides, assisted SKTC, isometric and eccentric knee flexion in hooklying.  Gait after tone reduction:   - with rolling walker - 2 interruptions for caught /R  toe   - cues for R and L turns with exaggerated L step then big R swing through   - 200' x 3.    Not performed on 1/13/2025:   - standing L/R PF stretch with foot on wedge, heel on floor.  Even and staggered stance.   - standing with B UE support and feet flat on the floor in even stance with B knee flexion for increased soleus stretch   - Side stepping with cued R knee flexion prior to attempted lift/motion. - 10'x4 R/L   - lunge walk - 10' x 6   - walk with exaggerated hip/knee flexion - 10'x6    - retro walk with emphasis on heel lift and knee flexion prior to step back - 10'x4.    OP EDUCATION:   Excellent review and demonstration of exercises including additions/modifications.  Patient voices understanding and intended compliance.  Add in stationary bike riding at home at least 3x a week for 10-15 minutes.    Goals:  Active       PT Problem       TUG       Start:  09/17/24    Expected End:  12/01/24       Improve TUG time to < 18.5 secs with rollator         10 meter walk        Start:  09/17/24    Expected End:  12/01/24       Improve 10 meter walk to < 18.0 secs         Gait       Start:  09/17/24    Expected End:  12/01/24       Patient will ambulate with greater right foot clearance, and less compensation for right swing. She will ambulate 210 ft with < 35 instances of toe drag/catching to improve safety and efficiency.          Right ankle Dorsiflexion       Start:  09/17/24    Expected End:  12/01/24       Right ankle DF PROM with knee extended > neutral, and knee flexed > 6 deg;            HEP       Start:  09/17/24    Expected End:  12/01/24       Patient will be independent with HEP to maximize her functional mobility         Patient stated goal       Start:  09/17/24    Expected End:  12/01/24       Reduce tightness and walk better

## 2025-01-21 ENCOUNTER — APPOINTMENT (OUTPATIENT)
Dept: PHYSICAL THERAPY | Facility: CLINIC | Age: 55
End: 2025-01-21
Payer: COMMERCIAL

## 2025-01-27 ENCOUNTER — APPOINTMENT (OUTPATIENT)
Dept: INFUSION THERAPY | Facility: CLINIC | Age: 55
End: 2025-01-27
Payer: COMMERCIAL

## 2025-01-30 ENCOUNTER — APPOINTMENT (OUTPATIENT)
Dept: PHYSICAL THERAPY | Facility: CLINIC | Age: 55
End: 2025-01-30
Payer: COMMERCIAL

## 2025-02-04 ENCOUNTER — APPOINTMENT (OUTPATIENT)
Dept: INFUSION THERAPY | Facility: CLINIC | Age: 55
End: 2025-02-04
Payer: COMMERCIAL

## 2025-02-07 PROCEDURE — RXMED WILLOW AMBULATORY MEDICATION CHARGE

## 2025-02-08 ENCOUNTER — PHARMACY VISIT (OUTPATIENT)
Dept: PHARMACY | Facility: CLINIC | Age: 55
End: 2025-02-08
Payer: COMMERCIAL

## 2025-02-10 ENCOUNTER — APPOINTMENT (OUTPATIENT)
Dept: PHYSICAL THERAPY | Facility: CLINIC | Age: 55
End: 2025-02-10
Payer: COMMERCIAL

## 2025-02-10 DIAGNOSIS — R29.898 RIGHT LEG WEAKNESS: Primary | ICD-10-CM

## 2025-02-10 DIAGNOSIS — G35 MS (MULTIPLE SCLEROSIS) (MULTI): ICD-10-CM

## 2025-02-10 DIAGNOSIS — R25.2 SPASTICITY: ICD-10-CM

## 2025-02-10 DIAGNOSIS — R26.9 ABNORMAL GAIT: ICD-10-CM

## 2025-02-10 PROCEDURE — 97112 NEUROMUSCULAR REEDUCATION: CPT | Mod: GP | Performed by: PHYSICAL THERAPIST

## 2025-02-10 NOTE — PROGRESS NOTES
"Physical Therapy    Physical Therapy Treatment    Patient Name: Shu Dimas  MRN: [id number]  Today's Date: 2/10/2025  Time Calculation  Start Time: 0805  Stop Time: 0900  Time Calculation (min): 55 min     PT Therapeutic Procedures Time Entry  Neuromuscular Re-Education Time Entry: 55       Visit number: 3 in 2025 (9 total)  Insurance: [employer]  Plan of Care Dates: 9/17/2024 - 12/16/2024; 12/17/2024 - 3/20/2025  Authorized visits: 30 PT per year ([place])   Primary diagnosis: R LE weakness    Assessment:   Patient presents for follow-up PT session this date.  She reports she contracted Influenza A 2 weeks ago and that she was hospitalized with pneumonia last weekend.  Her Ocrevus infusion was pushed back again due to these infections and is scheduled for next week.  Patient reports she was feeling tired using the LE ergometer for 10 minutes and cough has been persistent.  Patient feels weaker  then before getting sick, and (appropriately) has not been consistent with HEP.  NO falls.  Patient would like to  PT POC where she left off and is eager to schedule follow up appointments.     Patient tolerated all therapeutic activities well with out increase in pain.  She is eager to work on new exercises and incorporate them into her routine.  Patient will continue to benefit from skilled PT to continue to improve strength, balance, gait, and overall functional mobility.    Plan: Continue per plan of care.  Therapist requested patient contact PCP for \"return to PT recommendation\" after recent hospitalization.       Current Problem  Problem List Items Addressed This Visit             ICD-10-CM    MS (multiple sclerosis) (Multi) G35    Abnormal gait R26.9    Right leg weakness - Primary R29.898    Spasticity R25.2           Subjective    Patient states she is feeling better than she was last week when she was in the hospital.  No complaints.  Plans Ocrevus infusion next week.    Precautions:    Moderate Fall Risk based on " LE stiffness, weakness, impaired gait and coordination.  Significant falls history.    Pain   No pain today    Objective  (Per PT Initial Evaluation on 9/17/2024)  Outcome Measures:   10 meter walk = 20.44 secs  TUG 21.05 secs with rollator  1 lap around clinic 210 ft in 141 seconds and 64 right toe catches. Some toe catches are severe and she needs to stop to recover anterior LOB with heavy reliance on rollator.     Posture: increased kyphosis, rounded shoulders and forward head     Gait: Ambulates with rollator: left lean and slight vaulting during right swing. LLE is adducted in stance. Reduced hip and knee flexion with R swing and unable to reach neutral dorsiflexion.   Stairs:   Transfers: Increased UE use and compensation for RLE weakness     Coordination: NT     Tone: No considerable hypertonicity was noted     Sensation: light touch intact throughout LEs     Lower Extremity Strength:  Date: eval RIGHT LEFT   Hip Flexion 2-    4+   Hip Extension 4+ 5   Hip Abduction 2 4+   Hip Adduction       Knee Extension 4+ 5   Knee Flexion 2 5   Ankle DF 2- 5   Ankle PF 4+ 5   Ankle INV 4- 5   Ankle EV 4- 5      Lower Extremity ROM:  Date: eval RIGHT LEFT   Hip Flexion       Hip Extension       Hip IR       Hip ER       Knee Extension       Knee Flexion       Ankle DF 0 deg knee extended; +4 knee flexed     Ankle PF 25 deg     Ankle INV       Ankle EV           Treatments:  NEUROMUSCULAR REEDUCATION x 55 minutes  Patient entered and exited clinic ambulating with rolling walker and supervision 50' x 2.   Recumbent LE ergometer- 10 minutes, 5F/5B. LEs only, L 1.0   Reviewed previous HEP with good accuracy and demonstration: 5 - 10 reps per.  Re-initiate   - standing L LE quad stretch with L/R knee on chair, even and staggered stance.   - seated L knee flexion/HS curl in dangle    - seated L LAQ/SLR once roll reaches max distance from seat R active, R Active assisted   - standing L/R PF stretch with foot on wedge, heel on  floor.  Even and staggered stance.  Standing mini squats at counter with mimicked open cabinet door for improved knee clearance.   Gait after tone reduction:   - gait ex in parallel bars: staggered stance R back with calf stretch, then big swing with exaggerated HF/knee advancement - repeated over 10' - 10x   - gait in parallel bars - 10' x 6   - with rolling walker - 1 interruption for caught /R toe   - cues for R and L turns with exaggerated L step then big R swing through   - 80' x 1.    Not performed on 2/10/2025:   - standing with B UE support and feet flat on the floor in even stance with B knee flexion for increased soleus stretch   - Side stepping with cued R knee flexion prior to attempted lift/motion. - 10'x4 R/L   - lunge walk - 10' x 6   - walk with exaggerated hip/knee flexion - 10'x6   - retro walk with emphasis on heel lift and knee flexion prior to step back - 10'x4.    OP EDUCATION:   Review and demonstration of exercises indicated good recall of HEP - continue/restart.  Patient voices understanding and intended compliance.      Goals:  Active       PT Problem       TUG       Start:  09/17/24    Expected End:  12/01/24       Improve TUG time to < 18.5 secs with rollator         10 meter walk        Start:  09/17/24    Expected End:  12/01/24       Improve 10 meter walk to < 18.0 secs         Gait       Start:  09/17/24    Expected End:  12/01/24       Patient will ambulate with greater right foot clearance, and less compensation for right swing. She will ambulate 210 ft with < 35 instances of toe drag/catching to improve safety and efficiency.          Right ankle Dorsiflexion       Start:  09/17/24    Expected End:  12/01/24       Right ankle DF PROM with knee extended > neutral, and knee flexed > 6 deg;            HEP       Start:  09/17/24    Expected End:  12/01/24       Patient will be independent with HEP to maximize her functional mobility         Patient stated goal       Start:  09/17/24     Expected End:  12/01/24       Reduce tightness and walk better

## 2025-02-18 ENCOUNTER — APPOINTMENT (OUTPATIENT)
Dept: INFUSION THERAPY | Facility: CLINIC | Age: 55
End: 2025-02-18
Payer: COMMERCIAL

## 2025-02-18 VITALS
TEMPERATURE: 96.9 F | HEART RATE: 96 BPM | BODY MASS INDEX: 33.43 KG/M2 | SYSTOLIC BLOOD PRESSURE: 107 MMHG | RESPIRATION RATE: 16 BRPM | OXYGEN SATURATION: 96 % | WEIGHT: 207.12 LBS | DIASTOLIC BLOOD PRESSURE: 75 MMHG

## 2025-02-18 DIAGNOSIS — G35 MULTIPLE SCLEROSIS (MULTI): ICD-10-CM

## 2025-02-18 DIAGNOSIS — G35 MS (MULTIPLE SCLEROSIS) (MULTI): ICD-10-CM

## 2025-02-18 DIAGNOSIS — Z79.899 DRUG-INDUCED IMMUNODEFICIENCY (MULTI): ICD-10-CM

## 2025-02-18 DIAGNOSIS — D84.821 DRUG-INDUCED IMMUNODEFICIENCY (MULTI): ICD-10-CM

## 2025-02-18 LAB
BASOPHILS # BLD AUTO: 0.03 X10*3/UL (ref 0–0.1)
BASOPHILS NFR BLD AUTO: 0.6 %
EOSINOPHIL # BLD AUTO: 0.07 X10*3/UL (ref 0–0.7)
EOSINOPHIL NFR BLD AUTO: 1.5 %
ERYTHROCYTE [DISTWIDTH] IN BLOOD BY AUTOMATED COUNT: 13.2 % (ref 11.5–14.5)
HCT VFR BLD AUTO: 40.6 % (ref 36–46)
HGB BLD-MCNC: 13.9 G/DL (ref 12–16)
IGA SERPL-MCNC: 51 MG/DL (ref 70–400)
IGG SERPL-MCNC: 365 MG/DL (ref 700–1600)
IGM SERPL-MCNC: 23 MG/DL (ref 40–230)
IMM GRANULOCYTES # BLD AUTO: 0.02 X10*3/UL (ref 0–0.7)
IMM GRANULOCYTES NFR BLD AUTO: 0.4 % (ref 0–0.9)
LYMPHOCYTES # BLD AUTO: 1.29 X10*3/UL (ref 1.2–4.8)
LYMPHOCYTES NFR BLD AUTO: 26.9 %
MCH RBC QN AUTO: 29.9 PG (ref 26–34)
MCHC RBC AUTO-ENTMCNC: 34.2 G/DL (ref 32–36)
MCV RBC AUTO: 87 FL (ref 80–100)
MONOCYTES # BLD AUTO: 0.39 X10*3/UL (ref 0.1–1)
MONOCYTES NFR BLD AUTO: 8.1 %
NEUTROPHILS # BLD AUTO: 2.99 X10*3/UL (ref 1.2–7.7)
NEUTROPHILS NFR BLD AUTO: 62.5 %
NRBC BLD-RTO: 0 /100 WBCS (ref 0–0)
PLATELET # BLD AUTO: 236 X10*3/UL (ref 150–450)
RBC # BLD AUTO: 4.65 X10*6/UL (ref 4–5.2)
WBC # BLD AUTO: 4.8 X10*3/UL (ref 4.4–11.3)

## 2025-02-18 PROCEDURE — 2500000004 HC RX 250 GENERAL PHARMACY W/ HCPCS (ALT 636 FOR OP/ED): Performed by: NURSE PRACTITIONER

## 2025-02-18 PROCEDURE — 85025 COMPLETE CBC W/AUTO DIFF WBC: CPT

## 2025-02-18 PROCEDURE — 82784 ASSAY IGA/IGD/IGG/IGM EACH: CPT

## 2025-02-18 PROCEDURE — 96413 CHEMO IV INFUSION 1 HR: CPT | Performed by: NURSE PRACTITIONER

## 2025-02-18 PROCEDURE — 88184 FLOWCYTOMETRY/ TC 1 MARKER: CPT

## 2025-02-18 PROCEDURE — 88185 FLOWCYTOMETRY/TC ADD-ON: CPT

## 2025-02-18 PROCEDURE — 88187 FLOWCYTOMETRY/READ 2-8: CPT | Performed by: PSYCHIATRY & NEUROLOGY

## 2025-02-18 PROCEDURE — 96375 TX/PRO/DX INJ NEW DRUG ADDON: CPT | Performed by: NURSE PRACTITIONER

## 2025-02-18 PROCEDURE — 96415 CHEMO IV INFUSION ADDL HR: CPT | Performed by: NURSE PRACTITIONER

## 2025-02-18 RX ORDER — DIPHENHYDRAMINE HYDROCHLORIDE 50 MG/ML
50 INJECTION INTRAMUSCULAR; INTRAVENOUS AS NEEDED
OUTPATIENT
Start: 2025-06-12

## 2025-02-18 RX ORDER — ONDANSETRON HYDROCHLORIDE 2 MG/ML
4 INJECTION, SOLUTION INTRAVENOUS ONCE
Status: COMPLETED | OUTPATIENT
Start: 2025-02-18 | End: 2025-02-18

## 2025-02-18 RX ORDER — FAMOTIDINE 10 MG/ML
20 INJECTION, SOLUTION INTRAVENOUS ONCE AS NEEDED
OUTPATIENT
Start: 2025-06-12

## 2025-02-18 RX ORDER — DIPHENHYDRAMINE HYDROCHLORIDE 50 MG/ML
25 INJECTION INTRAMUSCULAR; INTRAVENOUS ONCE
Status: COMPLETED | OUTPATIENT
Start: 2025-02-18 | End: 2025-02-18

## 2025-02-18 RX ORDER — DIPHENHYDRAMINE HYDROCHLORIDE 50 MG/ML
25 INJECTION INTRAMUSCULAR; INTRAVENOUS ONCE
OUTPATIENT
Start: 2025-06-12

## 2025-02-18 RX ORDER — ALBUTEROL SULFATE 0.83 MG/ML
2.5 SOLUTION RESPIRATORY (INHALATION) 2 TIMES DAILY
COMMUNITY

## 2025-02-18 RX ORDER — ONDANSETRON 4 MG/1
4 TABLET, FILM COATED ORAL ONCE
Status: COMPLETED | OUTPATIENT
Start: 2025-02-18 | End: 2025-02-18

## 2025-02-18 RX ORDER — EPINEPHRINE 0.3 MG/.3ML
0.3 INJECTION SUBCUTANEOUS EVERY 5 MIN PRN
OUTPATIENT
Start: 2025-06-12

## 2025-02-18 RX ORDER — ALBUTEROL SULFATE 0.83 MG/ML
3 SOLUTION RESPIRATORY (INHALATION) AS NEEDED
OUTPATIENT
Start: 2025-06-12

## 2025-02-18 RX ORDER — ONDANSETRON HYDROCHLORIDE 2 MG/ML
4 INJECTION, SOLUTION INTRAVENOUS ONCE
OUTPATIENT
Start: 2025-06-12 | End: 2025-06-12

## 2025-02-18 RX ORDER — ACETAMINOPHEN 325 MG/1
650 TABLET ORAL ONCE
OUTPATIENT
Start: 2025-06-12

## 2025-02-18 RX ORDER — FLUTICASONE PROPIONATE 50 MCG
1 SPRAY, SUSPENSION (ML) NASAL DAILY
COMMUNITY

## 2025-02-18 RX ORDER — ACETAMINOPHEN 325 MG/1
650 TABLET ORAL ONCE
Status: COMPLETED | OUTPATIENT
Start: 2025-02-18 | End: 2025-02-18

## 2025-02-18 RX ADMIN — ONDANSETRON 4 MG: 4 TABLET, FILM COATED ORAL at 14:55

## 2025-02-18 RX ADMIN — ONDANSETRON HYDROCHLORIDE 4 MG: 2 INJECTION, SOLUTION INTRAVENOUS at 09:22

## 2025-02-18 RX ADMIN — DIPHENHYDRAMINE HYDROCHLORIDE 25 MG: 50 INJECTION INTRAMUSCULAR; INTRAVENOUS at 09:22

## 2025-02-18 RX ADMIN — ACETAMINOPHEN 650 MG: 325 TABLET ORAL at 09:07

## 2025-02-18 ASSESSMENT — ENCOUNTER SYMPTOMS
FEVER: 0
NUMBNESS: 0
NERVOUS/ANXIOUS: 0
FATIGUE: 0
COUGH: 1
CONFUSION: 0
DIZZINESS: 0
NAUSEA: 0
APPETITE CHANGE: 0
LEG SWELLING: 0
DECREASED CONCENTRATION: 0
VOMITING: 0
EYE PROBLEMS: 0
DEPRESSION: 0
DYSURIA: 0
WOUND: 0
TROUBLE SWALLOWING: 1
PALPITATIONS: 0
ARTHRALGIAS: 0
MYALGIAS: 0
SHORTNESS OF BREATH: 0
ADENOPATHY: 0
CONSTIPATION: 1
DIARRHEA: 0
SEIZURES: 0
SORE THROAT: 0

## 2025-02-18 NOTE — PROGRESS NOTES
[facility]   Infusion Clinic Note   Date: 2025   Name: Shu Dimas  : 1970   MRN: [id number]         Reason for Visit: OP Infusion (PT. HERE FOR Q 6 MONTH OCREVUS 600 MG IV INFUSION.)         Today: We administered acetaminophen, methylPREDNISolone sod succinate, diphenhydrAMINE, ondansetron, and ocrelizumab (Ocrevus) 600 mg in sodium chloride 0.9% 520 mL IV.       Ordered By: Pete Calloway MD P*       For a Diagnosis of: Multiple sclerosis (Multi)    Drug-induced immunodeficiency (Multi)    MS (multiple sclerosis) (Multi)       At today's visit patient accompanied by:       Vitals:   Vitals:    25 0747 25 1025 25 1053 25 1118   BP: 109/76 116/81 103/70 102/70   Pulse: 89 87 81 80   Resp: 18 16 16 16   Temp: 36.2 °C (97.2 °F) 36.2 °C (97.2 °F) 36.4 °C (97.6 °F) 36.2 °C (97.1 °F)   SpO2: 99% 96% 95% 95%   Weight: 93.9 kg (207 lb 2 oz)                Pre - Treatment Checklist:      - Previous reaction to current treatment: yes,PT. GETS NAUSEATED, USES ZOFRAN FOR    PREMED.   Assess patient for the concerns below. Document provider notification as appropriate.  - Active or recent infection with/without current antibiotic use: no  - Recent or planned invasive dental work: yes, PT. GETTING A BRIDGE.  - Recent or planned surgeries: no  - Recently received or plans to receive vaccinations: no  - Has treatment related toxicities: no  - Any chance may be pregnant:  no      Pain: 0   - Is the pain different from normal: n/a   - Is prescribing Doctor aware:  n/a      Labs: CBC-D, IgGAM, B-CELL PHENOTYPING DRAWN AND SENT PRE INFUSION.      Fall Risk Screening: Rajesh Fall Risk  History of Falling, Immediate or Within 3 Months: Yes (PT. FEELS SHE HAS FALLEN 1-2 TIMES IN LAST 3 MONTHS R/T HAVING FLU AND PNEUMONIA.)  Secondary Diagnosis: No  Ambulatory Aid: Crutches/cane/walker (PT. USES ROLLATOR.)  Intravenous Therapy/Heparin Lock:  Yes  Gait/Transferring: Impaired   Mental Status: Oriented to own ability  Mena Fall Risk Score: 80       Review Of Systems:  Review of Systems   Constitutional:  Negative for appetite change, fatigue and fever.   HENT:   Positive for mouth sores and trouble swallowing. Negative for hearing loss and sore throat.         PT. DOES HAVE SCABBED OVER COLD SORES UNDER LEFT NARE.  PT. ADMITS TO DYSPHAGIA, POSSIBLE ASPIRATION, HAS HAD ESOPHAGUS DILATED A FEW TIMES.   Eyes:  Negative for eye problems.   Respiratory:  Positive for cough. Negative for shortness of breath.         PT. HAD INFLUENZA A, AND PNEUMONIA IN JAN/ FEBRUARY.  ATB'S COMPLETE, PT. DENIES FEVER.  DOES HAVE OCCASIONAL COUGH, FEELS IT IS A LOT BETTER.  ON NEBULIZER TREATMENTS AT HOME.  PT. MAY BE HAVING ASPIRATION.  OCREVUS POSTPONED IN DECEMBER, PT. STATES MD OK WITH OCREVUS TODAY.   Cardiovascular:  Negative for chest pain, leg swelling and palpitations.   Gastrointestinal:  Positive for constipation. Negative for diarrhea, nausea and vomiting.        OCCASIONAL CONSTIPATION, USES MIRALAX PRN.   Genitourinary:  Negative for dysuria.         HX OF RIGHT BREAST CANCER, S/P LUMPECTOMY, CHEMO, RADIATION IN 2022.   Musculoskeletal:  Positive for gait problem. Negative for arthralgias and myalgias.   Skin:  Negative for itching, rash and wound.        LEFT CHEST PORT WELL HEALED.   Neurological:  Positive for gait problem. Negative for dizziness, numbness and seizures.        PT. WITH HX OF MULTIPLE SCLEROSIS, ADMITS TO RIGHT SIDED WEAKNESS, RIGHT FOOT DROP. DOES USE ROLLATOR.   WEAKNESS  INCREASED WITH SICKNESS, IE FLU, PNEUMONIA, FEVER.  PT. IS GOING THROUGH PHYSICAL THERAPY.  GETTING BOTOX TO RIGHT CALF AREA.   Hematological:  Negative for adenopathy.   Psychiatric/Behavioral:  Negative for confusion, decreased concentration and depression. The patient is not nervous/anxious.          Infusion Readiness:  - Assessment Concerns Related to Infusion: No  -  "Provider notified: n/a      New Patient Education:    N/A (returning patient for continuation of therapy. Ongoing education provided as needed.)        Treatment Conditions & Drug Specific Questions:    Ocrelizumab  (OCREVUS)    (Unless otherwise specified on patient specific therapy plan):     TREATMENT CONDITIONS:  Unless otherwise specified on patient specific therapy plan HOLD and notify provider prior to proceeding with today's infusion if patient with:  o Positive Hepatitis B Surface Ag or panel  o Positive T-Spot    Lab Results   Component Value Date    TBSIN Negative 06/29/2020      Lab Results   Component Value Date    HEPBSAG Nonreactive 06/11/2024      No results found for: \"HEPATOT\", \"HEPAIGM\", \"HEPBCIGM\", \"HEPBCAB\", \"HBEAG\", \"HEPCAB\"   No results found for: \"NONUHFIRE\", \"NONUHSWGH\", \"NONUHFISH\", \"EXTHEPBSAG\"    Patient meets treatment conditions? Yes    DRUG SPECIFIC QUESTIONS:  Any signs or symptoms of Progressive multifocal leukoencephalopathy (PML) which may include: memory loss, trouble thinking, dizziness, loss of balance, difficulty talking / walking or loss of vision? No    (If YES HOLD and notify provider)      REMINDER:  Recommended Vitals/Observation:  Vitals: Obtain vital signs every 30 minutes / with each ramp up. Once maximum rate is reached obtain vitals hourly until infusion is complete. Obtain vitals at end of observation period and PRN.  Observation: Observe patient for 60 minutes after each infusion. Observation complete.          Weight Based Drug Calculations:    WEIGHT BASED DRUGS: NOT APPLICABLE / FLAT DOSE       Post Treatment: Patient tolerated treatment without issue and was discharged in no apparent distress. PT. ADMITS TO SLIGHT NAUSEA AFTER INFUSION COMPLETE, GIVEN GINGER ALE TO SIP ON. PT. PLAYING CARDS WITH .  PT. STILL SLIGHTLY NAUSEATED, GIVEN ZOFRAN 4 MG ODT AT 1455. PT. HAS A 75 MINUTE DRIVE HOME. DOES HAVE A PRN ORDER FOR AT HOME IF NEEDED. PT. STATES NAUSEA CAN " LAST A DAY OR SO.      Note Authored / Patient Cared for By: Maryse Martínez RN

## 2025-02-18 NOTE — PATIENT INSTRUCTIONS
Today :We administered acetaminophen, methylPREDNISolone sod succinate, diphenhydrAMINE, ondansetron, and ocrelizumab (Ocrevus) 600 mg in sodium chloride 0.9% 520 mL IV.     For:   1. Multiple sclerosis (Multi)    2. Drug-induced immunodeficiency (Multi)    3. MS (multiple sclerosis) (Multi)         Your next appointment is due in: 6 MONTHS       Please read the  Medication Guide that was given to you and reviewed during todays visit.     (Tell all doctors including dentists that you are taking this medication)     Go to the emergency room or call 911 if:  -You have signs of allergic reaction:   -Rash, hives, itching.   -Swollen, blistered, peeling skin.   -Swelling of face, lips, mouth, tongue or throat.   -Tightness of chest, trouble breathing, swallowing or talking     Call your doctor:  - If IV / injection site gets red, warm, swollen, itchy or leaks fluid or pus.     (Leave dressing on your IV site for at least 2 hours and keep area clean and dry  - If you get sick or have symptoms of infection or are not feeling well for any reason.    (Wash your hands often, stay away from people who are sick)  - If you have side effects from your medication that do not go away or are bothersome.     (Refer to the teaching your nurse gave you for side effects to call your doctor about)    - Common side effects may include:  stuffy nose, headache, feeling tired, muscle aches, upset stomach  - Before receiving any vaccines     - Call the [facility] at   If:  - You get sick, are on antibiotics, have had a recent vaccine, have surgery or dental work and your doctor wants your visit rescheduled.  - You need to cancel and reschedule your visit for any reason. Call at least 2 days before your visit if you need to cancel.   - Your insurance changes before your next visit.    (We will need to get approval from your new insurance. This can take up to two weeks.)     The [facility] is opened Monday thru  Friday. We are closed on weekends and holidays.   Voice mail will take your call if the center is closed. If you leave a message please allow 24 hours for a call back during weekdays. If you leave a message on a weekend/holiday, we will call you back the next business day.    A pharmacist is available Monday - Friday from 8:30AM to 3:30PM to help answer any questions you may have about your prescriptions(s). Please call pharmacy at:    [facility]: [phone]  [facility]: [phone]  [facility]: [phone]

## 2025-02-19 LAB
CD19 CELLS # BLD: 0.02 X10E9/L
CD19 CELLS NFR BLD: 1.5 %
CD19+CD24++CD38++%: 1.5 %
CD19+CD24-CD38++%: 0.2 %
CD19+CD27+IGD+%: 1.5 %
CD19+CD27+IGD-%: 0.3 %
CD19+CD27-IGD+%: 97.7 %
FLOW CYTOMETRY SPECIALIST REVIEW: ABNORMAL
LYMPHOCYTES # SPEC AUTO: 1.29 X10*3/UL
PATH REVIEW, B CELL PHENOTYPING, EXTENDED: ABNORMAL

## 2025-02-25 ENCOUNTER — TREATMENT (OUTPATIENT)
Dept: PHYSICAL THERAPY | Facility: CLINIC | Age: 55
End: 2025-02-25
Payer: COMMERCIAL

## 2025-02-25 DIAGNOSIS — G35 MS (MULTIPLE SCLEROSIS) (MULTI): ICD-10-CM

## 2025-02-25 DIAGNOSIS — R25.2 SPASTICITY: ICD-10-CM

## 2025-02-25 DIAGNOSIS — R26.9 ABNORMAL GAIT: ICD-10-CM

## 2025-02-25 DIAGNOSIS — R29.898 RIGHT LEG WEAKNESS: ICD-10-CM

## 2025-02-25 PROCEDURE — 97112 NEUROMUSCULAR REEDUCATION: CPT | Mod: GP | Performed by: PHYSICAL THERAPIST

## 2025-02-25 NOTE — PROGRESS NOTES
"Physical Therapy    Physical Therapy Treatment    Patient Name: Shu Dimas  MRN: [id number]  Today's Date: 2/25/2025  Time Calculation  Start Time: 1100  Stop Time: 1200  Time Calculation (min): 60 min     PT Therapeutic Procedures Time Entry  Neuromuscular Re-Education Time Entry: 60       Visit number: 4 in 2025 (9 total)  Insurance: [employer]  Plan of Care Dates: 9/17/2024 - 12/16/2024; 12/17/2024 - 3/20/2025  Authorized visits: 30 PT per year ([place])   Primary diagnosis: R LE weakness    Assessment:   Patient presents for follow-up PT session this date.  She reports she had her Ocrevus infusion last week and did not have the usual illness afterwards, due to a change in her pre-infusion medications.  She reports feeling fully recovered from the flu and hospitalization earlier in the year.  She also reports her cough is better and she has completed her steroids and antibiotics.  More consistent with HEP. No falls.   Patient tolerated all therapeutic activities well with out increase in pain.  Patient will continue to benefit from skilled PT to continue to improve strength, balance, gait, and overall functional mobility.    Plan: Continue per plan of care.  With focus on stretching and motor control as well as R LE strengthening throughout.     Current Problem  Problem List Items Addressed This Visit             ICD-10-CM    MS (multiple sclerosis) (Multi) G35    Abnormal gait R26.9    Right leg weakness R29.898    Spasticity R25.2           Subjective    Patient asked how she can increase R HF strength and if she should walk for longer distances, even if \"my form falls apart?\"    Precautions:    Moderate Fall Risk based on LE stiffness, weakness, impaired gait and coordination.  Significant falls history.    Pain   No pain today    Objective  (Per PT Initial Evaluation on 9/17/2024)  Outcome Measures:   10 meter walk = 20.44 secs  TUG 21.05 secs with rollator  1 lap around clinic 210 ft in 141 seconds and 64 right toe " catches. Some toe catches are severe and she needs to stop to recover anterior LOB with heavy reliance on rollator.     Posture: increased kyphosis, rounded shoulders and forward head     Gait: Ambulates with rollator: left lean and slight vaulting during right swing. LLE is adducted in stance. Reduced hip and knee flexion with R swing and unable to reach neutral dorsiflexion.   Stairs:   Transfers: Increased UE use and compensation for RLE weakness     Coordination: NT     Tone: No considerable hypertonicity was noted     Sensation: light touch intact throughout LEs     Lower Extremity Strength:  Date: eval RIGHT LEFT   Hip Flexion 2-    4+   Hip Extension 4+ 5   Hip Abduction 2 4+   Hip Adduction       Knee Extension 4+ 5   Knee Flexion 2 5   Ankle DF 2- 5   Ankle PF 4+ 5   Ankle INV 4- 5   Ankle EV 4- 5      Lower Extremity ROM:  Date: eval RIGHT LEFT   Hip Flexion       Hip Extension       Hip IR       Hip ER       Knee Extension       Knee Flexion       Ankle DF 0 deg knee extended; +4 knee flexed     Ankle PF 25 deg     Ankle INV       Ankle EV           Treatments:  NEUROMUSCULAR REEDUCATION x 60 minutes  Patient entered and exited clinic ambulating with rolling walker and supervision 50' x 2.   Recumbent LE ergometer- 10 minutes, 5F/5B. LEs only, L 1.0   Reviewed previous HEP with good accuracy and demonstration: 5 - 10 reps per.    - standing L/R LE quad stretch with L/R knee on chair, even and staggered stance.   - standing L/R PF stretch with foot on wedge, heel on floor.  Even and staggered stance.   - standing with B UE support and feet flat on the floor in even stance with B knee flexion for increased soleus stretch   - seated L knee flexion/HS curl in dangle    - seated bolster rolls - unilateral, HS curl.   - seated L LAQ/SLR once roll reaches max distance from seat R active, R Active assisted   - standing L/R PF stretch with foot on wedge, heel on floor.  Even and staggered stance.  Standing mini  squats at counter with mimicked open cabinet door for improved knee clearance.   Gait after tone reduction:   - 400' x 1.    Not performed on 2/10/2025:   - Side stepping with cued R knee flexion prior to attempted lift/motion. - 10'x4 R/L   - lunge walk - 10' x 6   - walk with exaggerated hip/knee flexion - 10'x6   - retro walk with emphasis on heel lift and knee flexion prior to step back - 10'x4.    OP EDUCATION:   Review and demonstration of exercises indicated good recall of HEP - continue/restart.  Patient voices understanding and intended compliance.      Goals:  Active       PT Problem       TUG       Start:  09/17/24    Expected End:  12/01/24       Improve TUG time to < 18.5 secs with rollator         10 meter walk        Start:  09/17/24    Expected End:  12/01/24       Improve 10 meter walk to < 18.0 secs         Gait       Start:  09/17/24    Expected End:  12/01/24       Patient will ambulate with greater right foot clearance, and less compensation for right swing. She will ambulate 210 ft with < 35 instances of toe drag/catching to improve safety and efficiency.          Right ankle Dorsiflexion       Start:  09/17/24    Expected End:  12/01/24       Right ankle DF PROM with knee extended > neutral, and knee flexed > 6 deg;            HEP       Start:  09/17/24    Expected End:  12/01/24       Patient will be independent with HEP to maximize her functional mobility         Patient stated goal       Start:  09/17/24    Expected End:  12/01/24       Reduce tightness and walk better

## 2025-02-27 ENCOUNTER — APPOINTMENT (OUTPATIENT)
Dept: NEUROLOGY | Facility: CLINIC | Age: 55
End: 2025-02-27
Payer: COMMERCIAL

## 2025-03-04 ENCOUNTER — TREATMENT (OUTPATIENT)
Dept: PHYSICAL THERAPY | Facility: CLINIC | Age: 55
End: 2025-03-04
Payer: COMMERCIAL

## 2025-03-04 ENCOUNTER — APPOINTMENT (OUTPATIENT)
Dept: UROLOGY | Facility: CLINIC | Age: 55
End: 2025-03-04
Payer: COMMERCIAL

## 2025-03-04 VITALS
HEIGHT: 66 IN | BODY MASS INDEX: 33.11 KG/M2 | HEART RATE: 88 BPM | WEIGHT: 206 LBS | SYSTOLIC BLOOD PRESSURE: 121 MMHG | DIASTOLIC BLOOD PRESSURE: 84 MMHG | TEMPERATURE: 97.3 F

## 2025-03-04 DIAGNOSIS — N31.9 NEUROGENIC BLADDER: ICD-10-CM

## 2025-03-04 DIAGNOSIS — G35 MS (MULTIPLE SCLEROSIS) (MULTI): ICD-10-CM

## 2025-03-04 DIAGNOSIS — R26.9 ABNORMAL GAIT: ICD-10-CM

## 2025-03-04 DIAGNOSIS — R39.15 URGENCY OF URINATION: ICD-10-CM

## 2025-03-04 DIAGNOSIS — R29.898 RIGHT LEG WEAKNESS: ICD-10-CM

## 2025-03-04 DIAGNOSIS — N36.44 DETRUSOR SPHINCTER DYSSYNERGIA: ICD-10-CM

## 2025-03-04 DIAGNOSIS — N39.41 URGENCY INCONTINENCE: ICD-10-CM

## 2025-03-04 DIAGNOSIS — N39.41 URGE INCONTINENCE OF URINE: Primary | ICD-10-CM

## 2025-03-04 DIAGNOSIS — R25.2 SPASTICITY: ICD-10-CM

## 2025-03-04 LAB
POC APPEARANCE, URINE: CLEAR
POC BILIRUBIN, URINE: NEGATIVE
POC BLOOD, URINE: NEGATIVE
POC COLOR, URINE: YELLOW
POC GLUCOSE, URINE: NEGATIVE MG/DL
POC KETONES, URINE: NEGATIVE MG/DL
POC LEUKOCYTES, URINE: NEGATIVE
POC NITRITE,URINE: NEGATIVE
POC PH, URINE: 6 PH
POC PROTEIN, URINE: NEGATIVE MG/DL
POC SPECIFIC GRAVITY, URINE: 1.01
POC UROBILINOGEN, URINE: 0.2 EU/DL

## 2025-03-04 PROCEDURE — 51798 US URINE CAPACITY MEASURE: CPT | Performed by: NURSE PRACTITIONER

## 2025-03-04 PROCEDURE — 3074F SYST BP LT 130 MM HG: CPT | Performed by: NURSE PRACTITIONER

## 2025-03-04 PROCEDURE — 81003 URINALYSIS AUTO W/O SCOPE: CPT | Performed by: NURSE PRACTITIONER

## 2025-03-04 PROCEDURE — 97112 NEUROMUSCULAR REEDUCATION: CPT | Mod: GP | Performed by: PHYSICAL THERAPIST

## 2025-03-04 PROCEDURE — 3079F DIAST BP 80-89 MM HG: CPT | Performed by: NURSE PRACTITIONER

## 2025-03-04 PROCEDURE — 3008F BODY MASS INDEX DOCD: CPT | Performed by: NURSE PRACTITIONER

## 2025-03-04 PROCEDURE — 1036F TOBACCO NON-USER: CPT | Performed by: NURSE PRACTITIONER

## 2025-03-04 PROCEDURE — 99213 OFFICE O/P EST LOW 20 MIN: CPT | Performed by: NURSE PRACTITIONER

## 2025-03-04 RX ORDER — MIRABEGRON 50 MG/1
50 TABLET, FILM COATED, EXTENDED RELEASE ORAL NIGHTLY
Qty: 90 TABLET | Refills: 3 | Status: SHIPPED | OUTPATIENT
Start: 2025-03-04 | End: 2026-03-04

## 2025-03-04 ASSESSMENT — PATIENT HEALTH QUESTIONNAIRE - PHQ9
1. LITTLE INTEREST OR PLEASURE IN DOING THINGS: NOT AT ALL
2. FEELING DOWN, DEPRESSED OR HOPELESS: NOT AT ALL
SUM OF ALL RESPONSES TO PHQ9 QUESTIONS 1 AND 2: 0

## 2025-03-04 NOTE — PROGRESS NOTES
"Physical Therapy    Physical Therapy Treatment    Patient Name: Shu Dimas  MRN: [id number]  Today's Date: 3/4/2025  Time Calculation  Start Time: 1105  Stop Time: 1200  Time Calculation (min): 55 min     PT Therapeutic Procedures Time Entry  Neuromuscular Re-Education Time Entry: 55       Visit number: 5 in 2025 (10 total)  Insurance: [employer]  Plan of Care Dates: 9/17/2024 - 12/16/2024; 12/17/2024 - 3/20/2025  Authorized visits: 30 PT per year ([place])   Primary diagnosis: R LE weakness    Assessment:   Patient presents for follow-up PT session this date.  She reports she had her Ocrevus infusion last week and did not have the usual illness afterwards, due to a change in her pre-infusion medications.  She reports feeling fully recovered from the flu and hospitalization earlier in the year.  She also reports her cough is better and she has completed her steroids and antibiotics.  More consistent with HEP. No falls.   Patient tolerated all therapeutic activities well with out increase in pain.  Patient will continue to benefit from skilled PT to continue to improve strength, balance, gait, and overall functional mobility.    Plan: Continue per plan of care.  With focus on stretching and motor control as well as R LE strengthening throughout.     Current Problem  Problem List Items Addressed This Visit             ICD-10-CM    MS (multiple sclerosis) (Multi) G35    Abnormal gait R26.9    Right leg weakness R29.898    Spasticity R25.2           Subjective    Patient asked how she can increase R HF strength and if she should walk for longer distances, even if \"my form falls apart?\"    Precautions:    Moderate Fall Risk based on LE stiffness, weakness, impaired gait and coordination.  Significant falls history.    Pain   No pain today    Objective  (Per PT Initial Evaluation on 9/17/2024)  Outcome Measures:   10 meter walk = 20.44 secs  TUG 21.05 secs with rollator  1 lap around clinic 210 ft in 141 seconds and 64 right toe " catches. Some toe catches are severe and she needs to stop to recover anterior LOB with heavy reliance on rollator.     Posture: increased kyphosis, rounded shoulders and forward head     Gait: Ambulates with rollator: left lean and slight vaulting during right swing. LLE is adducted in stance. Reduced hip and knee flexion with R swing and unable to reach neutral dorsiflexion.   Stairs:   Transfers: Increased UE use and compensation for RLE weakness     Coordination: NT     Tone: No considerable hypertonicity was noted     Sensation: light touch intact throughout LEs     Lower Extremity Strength:  Date: eval RIGHT LEFT   Hip Flexion 2-    4+   Hip Extension 4+ 5   Hip Abduction 2 4+   Hip Adduction       Knee Extension 4+ 5   Knee Flexion 2 5   Ankle DF 2- 5   Ankle PF 4+ 5   Ankle INV 4- 5   Ankle EV 4- 5      Lower Extremity ROM:  Date: eval RIGHT LEFT   Hip Flexion       Hip Extension       Hip IR       Hip ER       Knee Extension       Knee Flexion       Ankle DF 0 deg knee extended; +4 knee flexed     Ankle PF 25 deg     Ankle INV       Ankle EV           Treatments:  NEUROMUSCULAR REEDUCATION x 55 minutes  Patient entered and exited clinic ambulating with rolling walker and supervision 50' x 2.   Recumbent stepper - 8 minutes, LEs only  L 3.0   Reviewed previous HEP with good accuracy and demonstration: 5 - 10 reps per.    - standing L/R LE quad stretch with L/R knee on chair, even and staggered stance.   - standing L/R PF stretch with foot on wedge, heel on floor.  Even and staggered stance, knees flexed/extended.   - seated L knee flexion/HS curl in dangle with yellow TB - tension with variable resistance facilitating ecentric, concentric and isometric HS contractions.   - seated bolster rolls - R/L, HS curl.   - seated L LAQ/SLR once roll reaches max distance from seat R active, R Active assisted  Standing mini squats at counter with mimicked open cabinet door for improved knee clearance.   Gait after tone  reduction:   - 400' x 1.   - cues to initiate L DF during early swing for improved toe clearance.    Not performed on 2/10/2025:   - Side stepping with cued R knee flexion prior to attempted lift/motion. - 10'x4 R/L   - lunge walk - 10' x 6   - walk with exaggerated hip/knee flexion - 10'x6   - retro walk with emphasis on heel lift and knee flexion prior to step back - 10'x4.    OP EDUCATION:   Review and demonstration of exercises indicated good recall of HEP - continue/restart.  Patient voices understanding and intended compliance.      Goals:  Active       PT Problem       TUG       Start:  09/17/24    Expected End:  12/01/24       Improve TUG time to < 18.5 secs with rollator         10 meter walk        Start:  09/17/24    Expected End:  12/01/24       Improve 10 meter walk to < 18.0 secs         Gait       Start:  09/17/24    Expected End:  12/01/24       Patient will ambulate with greater right foot clearance, and less compensation for right swing. She will ambulate 210 ft with < 35 instances of toe drag/catching to improve safety and efficiency.          Right ankle Dorsiflexion       Start:  09/17/24    Expected End:  12/01/24       Right ankle DF PROM with knee extended > neutral, and knee flexed > 6 deg;            HEP       Start:  09/17/24    Expected End:  12/01/24       Patient will be independent with HEP to maximize her functional mobility         Patient stated goal       Start:  09/17/24    Expected End:  12/01/24       Reduce tightness and walk better

## 2025-03-04 NOTE — PATIENT INSTRUCTIONS
Plan:   Continue Myrbetriq 50 mg  UTI prevention, Dmanose 2000 mg daily   Follow up one year [facility]  Nurse line [phone]

## 2025-03-04 NOTE — PROGRESS NOTES
"03/04/25   [id number]    Follow up urge incontinence, hx MS, neurogenic bladder     Subjective      HPI Shu Dimas is a 55 y.o. female who presents for follow up hx MS with neurogenic bladder, urge incontinence d/t DO and DSD, voiding dysfunction; Last seen 3/1/24,     Breast cancer survivor, 2023, radiation    Rough year, Covid, Influenza A, pneumonia; dealing with cough; seeing pulmonary, maybe aspirated with hx MS per patient;     Leaking with coughing; but now out of hospital, cough better and doing better; feels she is ok for now if she continues to improve, not interested in procedure or PFPT at this time;     Had been started on Myrbetriq by Dr. Humphries and was on combination Myrbetriq and Vesicare at one point but w elevated PVR backed off Vesicare, no UTIs;     ml, no urgency, frequency or UUI right now per patient; 80% improvement in bladder symptoms with Myrbetriq, doesn't have urgency; affordable right now;     Creatinine 0.56 GFR > 60 on 12/12/24    PVR 21 ml, UA negative, maybe one UTI in past year, not sure; no hematuria; constipation under control right now;     Doing well, pad for security now;     Sexually active, no dyspareunia, using lubricants, doesn't feel she needs vaginal estrogen;     Objective     /84   Pulse 88   Temp 36.3 °C (97.3 °F)   Ht 1.676 m (5' 6\")   Wt 93.4 kg (206 lb)   BMI 33.25 kg/m²    Physical Exam  General: Appears comfortable and in no apparent distress, well nourished  Head: Normocephalic, atraumatic  Neck: trachea midline  Respiratory: respirations unlabored, no wheezes, and no use of accessory muscles  Cardiovascular: at rest no dyspnea, well perfused  Skin: no visible rashes or lesions  Neurologic: grossly intact, oriented to person, place, and time  Psychiatric: mood and affect appropriate  Musculoskeletal: in chair for appt. no difficulty w upper body movement, ambulating w rollator      Assessment/Plan   Problem List Items Addressed This Visit       "    Genitourinary and Reproductive    Neurogenic bladder    Relevant Orders    POCT UA Automated manually resulted    Post-Void Residual    Urge incontinence of urine - Primary    Relevant Orders    POCT UA Automated manually resulted    Post-Void Residual     Other Visit Diagnoses       Urgency of urination        Detrusor sphincter dyssynergia                Orders Placed This Encounter   Procedures    Post-Void Residual    POCT UA Automated manually resulted     Order Specific Question:   Release result to Jacked     Answer:   Immediate [1]           Plan:   Continue Myrbetriq 50 mg  UTI prevention, Dmanose 2000 mg daily   Follow up one year [facility]  Nurse line [phone]    Pat Atkinson, APRN-CNP  Lab Results   Component Value Date    GLUCOSE 81 12/02/2020    CALCIUM 9.5 12/02/2020     12/02/2020    K 3.6 12/02/2020    CO2 31 12/02/2020    CL 99 12/02/2020    BUN 7 12/02/2020    CREATININE 0.72 12/02/2020

## 2025-03-06 ENCOUNTER — APPOINTMENT (OUTPATIENT)
Dept: NEUROLOGY | Facility: CLINIC | Age: 55
End: 2025-03-06
Payer: COMMERCIAL

## 2025-03-06 VITALS — WEIGHT: 206 LBS | BODY MASS INDEX: 33.25 KG/M2

## 2025-03-06 DIAGNOSIS — M62.838 MUSCLE SPASM: ICD-10-CM

## 2025-03-06 DIAGNOSIS — G35 MULTIPLE SCLEROSIS (MULTI): Primary | ICD-10-CM

## 2025-03-06 NOTE — PATIENT INSTRUCTIONS
You were injected with a total of 200 units of botox. You tolerated the procedure well. The effect of botox usually kicks in after 3 to 5 days and lasts for three months. Please call office after about a week to report how you are doing. Follow up after three months for repeat injections.      Please continue physical therapy     Please continue daily stretching.     Thank you for visiting the clinic today    Bobby Noonan MD

## 2025-03-06 NOTE — PROGRESS NOTES
Subjective []Expand by Default  HPI        She is a 55 y.o. right handed female patient with hx of MS, breast cancer s/p lumpectomy/radiation/chemotherapy, and HTN who is referred by Dr. Calloway for advanced spasticity evaluation. She is seen along with her . Will share the note with the referring physician via EMR        Interval HX: walking is a bit better but she and her physical therapist cannot tell if it's due to botox or not but prefer not to change anything. No side effects. Injected the same dose.      Cause of spasticity: MS  Cerebral spasticity: possible  Spinal spasticity: yes  Mobility: walker  Transfers: on her own   Falls: yes twice a year  AFO:  could not walk with it. She has a heel lift on the left.      Spasticity interferes with residual function: yes spasticity affects walking.   Spasticity interferes with ROM and posture: ankle feels stiff but no abnormal posture  Spasticity causes pain and/or discomfort:  infrequent isometric spasms in the right calf, twice a year or so, painful, relieved by movement.   Spasticity interferes with hygiene or skin care: no   UTIs and last urine check: yes but no effect on spasticity.   temperature effect: very cold weather causes her whole body to be tight     Tonic spasms:   Flexor: no   Extensor: no   Adductor:  no   Isometric (cramps):   infrequent isometric spasms in the right calf, twice a year or so, painful, relieved by movement.   Complex (more than 1 at a time): no   Painful:  yes see above   Focal dystonia:   no   Myoclonus:  no  Spontaneous clonus: no     Tremor:  no   RLS:  no      Treatment:  Baclofen: took it in the past but stopped it because it wasn't helping. Does not recall the dose. could not tolerate a retry.   Tizanidine:  Tried but did not tolerate. Caused anxiety and restlessness and she was not able to sleep.  Dantrolene: not taking  Gabapentin: took it in the past for shingles   pregabalin: no   Tegretol:  no   Trileptal:  no    Anticholinergics: no    Dopamine agonist: no   Botulinum toxin: here to discuss.   Baclofen pump: no  Other treatments: no      PT: doing currently, helpful   OT: did in the past, helpful   Stretching: yes daily   Exercise: no but used to do stationary bike in the past.      Goals of spasticity management:   Function: improve walking and gait.   ROM/posture:  no goals  Comfort:  no goals  Hygiene and skin care:  no goals.            Review of Systems  All other system have been reviewed and are negative for complaint.        Objective   Neurological Exam     Physical Exam                      Spasticity exam:     Modified Doris Scale (MAS)  Elbow Extension Score: Slight increase at the end of the range of motion  Elbow Flexion Score: No increase  Wrist Extension Score: No increase  Finger Extension Score: No increase  Thumb ABduction Score: No increase  Hamstring Extension Score: Slight increase, minimal resistance throughout the remainder (less than half) of the range of motion  Quadriceps Extension Score: Slight increase, minimal resistance throughout the remainder (less than half) of the range of motion  Ankle Dorsiflexion-Gastrocnemius Score: Considerable increase  Ankle Dorsiflexion-Soleus Score: Considerable increase  Modified Doris Scale Comments: on the right        Monster Spasm Frequency Scale:        Score = 1     ----------------------------  Extensor spasms: no   Flexor spasms: no   Adductor spasms: no  Isometric spasms: not on exam  Complex spasms: no  Abnormal fixed posture or fixed dystonia: no  Paroxysmal focal dystonia: no  Inducible clonus: two beats of unsustained ankle clonus on the left  Spontaneous clonus: no   Myoclonus: no  Tremor: no  RLS/akathisia: no               Procedure: written consent obtained. Mechanism of action, efficacy, side effects, risks, and alternatives associated with the medication were discussed and information material was provided. All questions answered.  Patient  was in the supine position. Area cleaned with alcohol. The following muscles were injected with EMG-guided needle: Rt med GC 50 units, Rt lat GC 50 units, and Rt soleus 100 units. Patient tolerated the procedure well.     LOT number: [id number]  EXP: 4/2027    Assessment/Plan   This is a 45 yo originally right handed female with hx of MS, breast cancer s/p lumpectomy/radiation/chemotherapy, and HTN who is referred for advanced spasticity management. Main concern is improving gait and right ankle stiffness. Exam positive for RLE weakness with moderate spasticity of the right hemibody most pronounced around the right ankle. No tonic spasms, myoclonus, or tremor on exam. oral baclofen has been ineffective in the past but she cannot recall the maximum dose she tried. Patient can be a good candidate for repeat oral baclofen trial and targeted botulinum toxin injection to the following muscles on the right: med GC 25 units, lat GC 25 units, and soleus 50 units. The doses can be increased over time as tolerated. ITB is not indicated given focal spasticity and lack of sufficient oral and injectable trials. she did not have that much signal on injection.      1/25/2024: could not tolerate baclofen and stopped it. First injection session tolerated well. Low signal on EMG. May consider tizanidine in the future.    5/23/2024:Mild  benefit from Botox last visit.Was able to move ankle better.We doubled the dose this time and injected 200 units and ordered Tizanidine 2 mg bedtime.the signal in Soleus was louder than Gastrocnemius.    9/26/2024:Reports she does not see a lot of benefit. But similar to the prior injection. No SE. After last injection she had surgery on left ankle so it is hard to tell if any benefit. Tizanidine at bedtime made her anxious and restless so she stopped taking. Will repeat same injection today.     3/6/2025: walking is a bit better but she and her physical therapist cannot tell if it's due to botox or not  but prefer not to change anything. No side effects. Injected the same dose. She will switch care to Dr. Garcia or Dr. Giron for better access.      PLAN:  You were injected with a total of 200 units of botox. You tolerated the procedure well. The effect of botox usually kicks in after 3 to 5 days and lasts for three months. Please call office after about a week to report how you are doing. Follow up after three months for repeat injections.      continue physical and occupational therapy.      Please continue daily stretching.       Bobby Noonan MD         Physical Exam  See above

## 2025-03-10 DIAGNOSIS — G35 MULTIPLE SCLEROSIS (MULTI): ICD-10-CM

## 2025-03-10 PROCEDURE — RXMED WILLOW AMBULATORY MEDICATION CHARGE

## 2025-03-10 RX ORDER — DALFAMPRIDINE 10 MG/1
10 TABLET, FILM COATED, EXTENDED RELEASE ORAL 2 TIMES DAILY
Qty: 60 TABLET | Refills: 5 | Status: SHIPPED | OUTPATIENT
Start: 2025-03-10

## 2025-03-11 ENCOUNTER — TREATMENT (OUTPATIENT)
Dept: PHYSICAL THERAPY | Facility: CLINIC | Age: 55
End: 2025-03-11
Payer: COMMERCIAL

## 2025-03-11 ENCOUNTER — SPECIALTY PHARMACY (OUTPATIENT)
Dept: PHARMACY | Facility: CLINIC | Age: 55
End: 2025-03-11

## 2025-03-11 DIAGNOSIS — R26.9 ABNORMAL GAIT: ICD-10-CM

## 2025-03-11 DIAGNOSIS — R25.2 SPASTICITY: ICD-10-CM

## 2025-03-11 DIAGNOSIS — G35 MS (MULTIPLE SCLEROSIS) (MULTI): ICD-10-CM

## 2025-03-11 DIAGNOSIS — R29.898 RIGHT LEG WEAKNESS: ICD-10-CM

## 2025-03-11 PROCEDURE — 97112 NEUROMUSCULAR REEDUCATION: CPT | Mod: GP | Performed by: PHYSICAL THERAPIST

## 2025-03-11 NOTE — PROGRESS NOTES
"Physical Therapy    Physical Therapy Treatment    Patient Name: Shu Dimas  MRN: [id number]  Today's Date: 3/11/2025  Time Calculation  Start Time: 0845  Stop Time: 0945  Time Calculation (min): 60 min     PT Therapeutic Procedures Time Entry  Neuromuscular Re-Education Time Entry: 60       Visit number: 6 in 2025 (10 total)  Insurance: [employer]  Plan of Care Dates: 9/17/2024 - 12/16/2024; 12/17/2024 - 3/20/2025  Authorized visits: 30 PT per year ([place])   Primary diagnosis: R LE weakness    Assessment:   Patient presents for follow-up PT session this date.  She reports she had her Botox injections last week and received instructions to continue therapy and stretching to determine if the injections are helpful.  Patient has been enjoying using the [employer] fit exercise programs with family over the past week.  More consistent with HEP. No falls.   Patient tolerated all therapeutic activities well with out increase in pain.  Patient will continue to benefit from skilled PT to continue to improve strength, balance, gait, and overall functional mobility.    Plan: Continue per plan of care.  With focus on stretching and motor control as well as R LE strengthening throughout.     Current Problem  Problem List Items Addressed This Visit             ICD-10-CM    MS (multiple sclerosis) (Multi) G35    Abnormal gait R26.9    Right leg weakness R29.898    Spasticity R25.2           Subjective    Patient asked how she can increase R HF strength and if she should walk for longer distances, even if \"my form falls apart?\"    Precautions:    Moderate Fall Risk based on LE stiffness, weakness, impaired gait and coordination.  Significant falls history.    Pain   No pain today    Objective    no functional outcomes assessed today      Treatments:  NEUROMUSCULAR REEDUCATION x 60 minutes  Patient entered and exited clinic ambulating with rolling walker and supervision 50' x 2.   Recumbent stepper - 8 minutes, LEs only  L 3.0   Reviewed " previous HEP with good accuracy and demonstration: 5 - 10 reps per.    - standing L/R LE quad stretch with L/R knee on chair, even and staggered stance.   - standing L/R PF stretch with foot on wedge, heel on floor.  Even and staggered stance, knees flexed/extended.   - seated L knee flexion/HS curl in dangle with yellow TB - tension with variable resistance facilitating ecentric, concentric and isometric HS contractions.   - seated bolster rolls - R/L, HS curl.   - seated L LAQ/SLR once roll reaches max distance from seat R active, R Active assisted  Standing mini squats at counter with mimicked open cabinet door for improved knee clearance.   Gait after tone reduction:   - 400' x 1.   - cues to initiate L DF during early swing for improved toe clearance.  Gait exercises in parallel bars:  - Side stepping with cued R knee flexion prior to attempted lift/motion. - 10' x 3 each R/L   - lunge walk - 10' x 4   - retro walk with emphasis on heel lift and knee flexion prior to step back - 10'x4.    OP EDUCATION:   Review and demonstration of exercises indicated good recall of HEP - continue/restart.  Patient voices understanding and intended compliance.      Goals:  Active       PT Problem       TUG       Start:  09/17/24    Expected End:  12/01/24       Improve TUG time to < 18.5 secs with rollator         10 meter walk        Start:  09/17/24    Expected End:  12/01/24       Improve 10 meter walk to < 18.0 secs         Gait       Start:  09/17/24    Expected End:  12/01/24       Patient will ambulate with greater right foot clearance, and less compensation for right swing. She will ambulate 210 ft with < 35 instances of toe drag/catching to improve safety and efficiency.          Right ankle Dorsiflexion       Start:  09/17/24    Expected End:  12/01/24       Right ankle DF PROM with knee extended > neutral, and knee flexed > 6 deg;            HEP       Start:  09/17/24    Expected End:  12/01/24       Patient will be  independent with HEP to maximize her functional mobility         Patient stated goal       Start:  09/17/24    Expected End:  12/01/24       Reduce tightness and walk better

## 2025-03-13 ENCOUNTER — PHARMACY VISIT (OUTPATIENT)
Dept: PHARMACY | Facility: CLINIC | Age: 55
End: 2025-03-13
Payer: COMMERCIAL

## 2025-03-18 ENCOUNTER — TREATMENT (OUTPATIENT)
Dept: PHYSICAL THERAPY | Facility: CLINIC | Age: 55
End: 2025-03-18
Payer: COMMERCIAL

## 2025-03-18 DIAGNOSIS — G35 MS (MULTIPLE SCLEROSIS) (MULTI): ICD-10-CM

## 2025-03-18 DIAGNOSIS — R29.898 RIGHT LEG WEAKNESS: Primary | ICD-10-CM

## 2025-03-18 DIAGNOSIS — R25.2 SPASTICITY: ICD-10-CM

## 2025-03-18 DIAGNOSIS — R26.9 ABNORMAL GAIT: ICD-10-CM

## 2025-03-18 DIAGNOSIS — G35 MULTIPLE SCLEROSIS (MULTI): ICD-10-CM

## 2025-03-18 PROCEDURE — 97112 NEUROMUSCULAR REEDUCATION: CPT | Mod: GP | Performed by: PHYSICAL THERAPIST

## 2025-03-18 ASSESSMENT — 10 METER WALK TEST (10METWT): AVERAGE: 17.7

## 2025-03-18 NOTE — PROGRESS NOTES
"Physical Therapy    Physical Therapy Treatment    Patient Name: Shu Dimas  MRN: [id number]  Today's Date: 3/18/2025                Visit number: 7 in 2025 (10 total)  Insurance: [place]  Plan of Care Dates: 9/17/2024 - 12/16/2024; 12/17/2024 - 3/20/2025  Authorized visits: 30 PT per year ([place])   Primary diagnosis: R LE weakness    Assessment:   Patient presents for follow-up PT session this date.  She reports she hasn't done much walking outside the house this week, but has been walking more and linger distances in the house.  Patient has been enjoying using the [employer] fit exercise programs with family over the past week.  More consistent with HEP. No falls.   Patient tolerated all therapeutic activities well with out increase in pain.  Patient will continue to benefit from skilled PT to continue to improve strength, balance, gait, and overall functional mobility.    Plan: Continue per plan of care.  With focus on stretching and motor control as well as R LE strengthening throughout.     Current Problem  Problem List Items Addressed This Visit             ICD-10-CM    MS (multiple sclerosis) (Multi) G35    Abnormal gait R26.9    Right leg weakness - Primary R29.898    Spasticity R25.2     Other Visit Diagnoses         Codes    Multiple sclerosis (Multi)     G35            Subjective    Patient asked how she can increase R HF strength and if she should walk for longer distances, even if \"my form falls apart?\"    Precautions:    Moderate Fall Risk based on LE stiffness, weakness, impaired gait and coordination.  Significant falls history.    Pain   No pain today    Objective    TUG - 18.4 sec with rolling walker   10MWT - 17.7 sec  with rolling walker   10MWT (fast) - 13.9 sec with rolling walker     Treatments:  NEUROMUSCULAR REEDUCATION x 60 minutes  Patient entered and exited clinic ambulating with rolling walker and supervision 50' x 2.   Recumbent LE ergometer - LEs only  L1.0, 5 minutes F/B - 10 minutes " total  Reviewed previous HEP with good accuracy and demonstration: 5 - 10 reps per.    - standing L/R LE quad stretch with L/R knee on chair, even and staggered stance.   - standing L/R PF stretch with foot on wedge, heel on floor.  Even and staggered stance, knees flexed/extended.  L DF activation/flexibility   - able to tolerate standing on wedge for PF stretch - achieves 10 degrees DF passively   - seated active DF - 0-5 degrees in sitting 90/90.   - seated with heel propped on floor - -10-0 degrees DF  Gait with rolling walker    - 200' x1  - 3x R toe drag    Not performed on 3/18/2025   - seated L knee flexion/HS curl in dangle with yellow TB - tension with variable resistance facilitating ecentric, concentric and isometric HS contractions.   - seated bolster rolls - R/L, HS curl.   - seated L LAQ/SLR once roll reaches max distance from seat R active, R Active assisted  Standing mini squats at counter with mimicked open cabinet door for improved knee clearance.   Gait exercises in parallel bars:  - Side stepping with cued R knee flexion prior to attempted lift/motion. - 10' x 3 each R/L   - lunge walk - 10' x 4   - retro walk with emphasis on heel lift and knee flexion prior to step back - 10'x4.    OP EDUCATION:   Review and demonstration of exercises indicated good recall of HEP - continue/restart.  Patient voices understanding and intended compliance.      Goals:  Active       PT Problem       TUG       Start:  09/17/24    Expected End:  12/01/24       Improve TUG time to < 18.5 secs with rollator         10 meter walk        Start:  09/17/24    Expected End:  12/01/24       Improve 10 meter walk to < 18.0 secs         Gait       Start:  09/17/24    Expected End:  12/01/24       Patient will ambulate with greater right foot clearance, and less compensation for right swing. She will ambulate 210 ft with < 35 instances of toe drag/catching to improve safety and efficiency.          Right ankle Dorsiflexion        Start:  09/17/24    Expected End:  12/01/24       Right ankle DF PROM with knee extended > neutral, and knee flexed > 6 deg;            HEP       Start:  09/17/24    Expected End:  12/01/24       Patient will be independent with HEP to maximize her functional mobility         Patient stated goal       Start:  09/17/24    Expected End:  12/01/24       Reduce tightness and walk better

## 2025-03-25 ENCOUNTER — TREATMENT (OUTPATIENT)
Dept: PHYSICAL THERAPY | Facility: CLINIC | Age: 55
End: 2025-03-25
Payer: COMMERCIAL

## 2025-03-25 DIAGNOSIS — G35 MS (MULTIPLE SCLEROSIS) (MULTI): ICD-10-CM

## 2025-03-25 DIAGNOSIS — R29.898 RIGHT LEG WEAKNESS: ICD-10-CM

## 2025-03-25 DIAGNOSIS — R25.2 SPASTICITY: ICD-10-CM

## 2025-03-25 DIAGNOSIS — R26.9 ABNORMAL GAIT: ICD-10-CM

## 2025-03-25 PROCEDURE — 97112 NEUROMUSCULAR REEDUCATION: CPT | Mod: GP | Performed by: PHYSICAL THERAPIST

## 2025-03-25 NOTE — PROGRESS NOTES
"Physical Therapy    Physical Therapy Treatment    Patient Name: Shu Dimas  MRN: [id number]  Today's Date: 3/25/2025  Time Calculation  Start Time: 0845  Stop Time: 0945  Time Calculation (min): 60 min     PT Therapeutic Procedures Time Entry  Neuromuscular Re-Education Time Entry: 60       Visit number: 8 in 2025   Insurance: [place]  Plan of Care Dates: 3/21/2025 - 5/30/2025; 12/17/2024 - 3/20/2025; 9/17/2024 - 12/16/2024  Authorized visits: 30 PT per year ([place])   Primary diagnosis: R LE weakness    Assessment:   Patient presents for follow-up PT session this date.  She reports she has been doing her exercises, but not much walking outside the house.  No falls.  Patient   Patient tolerated all therapeutic activities well with out increase in pain.  Patient will continue to benefit from skilled PT to continue to improve strength, balance, gait, and overall functional mobility.    Plan: Continue per plan of care.  With focus on stretching and motor control as well as R LE strengthening throughout.     Current Problem  Problem List Items Addressed This Visit             ICD-10-CM    MS (multiple sclerosis) (Multi) G35    Abnormal gait R26.9    Right leg weakness R29.898    Spasticity R25.2       Subjective    Patient asked how she can increase R HF strength and if she should walk for longer distances, even if \"my form falls apart?\"    Precautions:   Precautions  STEADI Fall Risk Score (The score of 4 or more indicates an increased risk of falling): 5 Moderate Fall Risk based on L LE stiffness, weakness, impaired gait and coordination.  Significant falls history.    Pain   Occasional lateral L ankle pain while walking during weightbearing - 3/10 at times.  Currently 0/10.  No instability    Objective    No functional measures performed today.    Treatments:  NEUROMUSCULAR REEDUCATION x 60 minutes  Patient entered and exited clinic ambulating with rolling walker and supervision 50' x 2.   Recumbent stepper - LEs only  " L3.0, 8 minutes total  Reviewed previous HEP with good accuracy and demonstration: 5 - 10 reps per.    - standing L/R LE quad stretch with L/R knee on chair, even and staggered stance.   - standing L/R PF stretch with foot on wedge, heel on floor.  Even and staggered stance, knees flexed/extended.   - seated L knee flexion/HS curl in dangle with yellow TB - tension with variable resistance facilitating ecentric, concentric and isometric HS contractions.   - seated bolster rolls - R/L, HS curl.   - cued for ballistic HS curl with increased force and range   L DF activation/flexibility   - able to tolerate standing on wedge for PF stretch - achieves 10 degrees DF passively   - seated active DF - 0-5 degrees in sitting 90/90.   - seated with heel propped on floor -10-0 degrees DF  LAQ - ballistic ball kick  Gait with rolling walker    - 400' x1       Not performed on 3/18/2025   - seated L knee flexion/HS curl in dangle with yellow TB - tension with variable resistance facilitating ecentric, concentric and isometric HS contractions.   - seated bolster rolls - R/L, HS curl.   - seated L LAQ/SLR once roll reaches max distance from seat R active, R Active assisted  Standing mini squats at counter with mimicked open cabinet door for improved knee clearance.   Gait exercises in parallel bars:  - Side stepping with cued R knee flexion prior to attempted lift/motion. - 10' x 3 each R/L   - lunge walk - 10' x 4   - retro walk with emphasis on heel lift and knee flexion prior to step back - 10'x4.    OP EDUCATION:   Review and demonstration of exercises indicated good recall of HEP - continue/restart.  Patient voices understanding and intended compliance.      Goals:  Active       PT Problem       TUG (Met)       Start:  09/17/24    Expected End:  05/30/25    Resolved:  03/18/25    Improve TUG time to < 18.5 secs with rollator         10 meter walk  (Met)       Start:  09/17/24    Expected End:  05/30/25    Resolved:  03/18/25     Improve 10 meter walk to < 18.0 secs         Gait (Met)       Start:  09/17/24    Expected End:  05/30/25    Resolved:  03/18/25    Patient will ambulate with greater right foot clearance, and less compensation for right swing. She will ambulate 210 ft with < 35 instances of toe drag/catching to improve safety and efficiency.          Right ankle Dorsiflexion (Progressing)       Start:  09/17/24    Expected End:  05/30/25       Right ankle DF PROM with knee extended > neutral, and knee flexed > 6 deg;            HEP (Met)       Start:  09/17/24    Expected End:  05/30/25    Resolved:  03/18/25    Patient will be independent with HEP to maximize her functional mobility         Patient stated goal (Progressing)       Start:  09/17/24    Expected End:  12/01/24       Reduce tightness and walk better

## 2025-04-01 ENCOUNTER — APPOINTMENT (OUTPATIENT)
Dept: PHYSICAL THERAPY | Facility: CLINIC | Age: 55
End: 2025-04-01
Payer: COMMERCIAL

## 2025-04-07 PROCEDURE — RXMED WILLOW AMBULATORY MEDICATION CHARGE

## 2025-04-08 ENCOUNTER — TREATMENT (OUTPATIENT)
Dept: PHYSICAL THERAPY | Facility: CLINIC | Age: 55
End: 2025-04-08
Payer: COMMERCIAL

## 2025-04-08 ENCOUNTER — PHARMACY VISIT (OUTPATIENT)
Dept: PHARMACY | Facility: CLINIC | Age: 55
End: 2025-04-08
Payer: COMMERCIAL

## 2025-04-08 DIAGNOSIS — R25.2 SPASTICITY: ICD-10-CM

## 2025-04-08 DIAGNOSIS — G35 MS (MULTIPLE SCLEROSIS) (MULTI): ICD-10-CM

## 2025-04-08 DIAGNOSIS — R29.898 RIGHT LEG WEAKNESS: ICD-10-CM

## 2025-04-08 DIAGNOSIS — R26.9 ABNORMAL GAIT: ICD-10-CM

## 2025-04-08 PROCEDURE — 97112 NEUROMUSCULAR REEDUCATION: CPT | Mod: GP | Performed by: PHYSICAL THERAPIST

## 2025-04-08 NOTE — PROGRESS NOTES
Physical Therapy    Physical Therapy Treatment    Patient Name: Shu Dimas  MRN: [id number]  Today's Date: 4/8/2025  Time Calculation  Start Time: 1300  Stop Time: 1350  Time Calculation (min): 50 min     PT Therapeutic Procedures Time Entry  Neuromuscular Re-Education Time Entry: 50       Visit number: 8 in 2025   Insurance: [place]  Plan of Care Dates: 3/21/2025 - 5/30/2025; 12/17/2024 - 3/20/2025; 9/17/2024 - 12/16/2024  Authorized visits: 30 PT per year ([place])   Primary diagnosis: R LE weakness    Assessment:   Patient presents for follow-up PT session this date.  She reports she has been doing her exercises, but not much walking outside the house.  No falls.  Patient tolerated all therapeutic activities well with out increase in pain.  Patient will continue to benefit from skilled PT to continue to improve strength, balance, gait, and overall functional mobility.    Plan: Continue per plan of care.  With focus on stretching and motor control as well as R LE strengthening throughout.     Current Problem  Problem List Items Addressed This Visit             ICD-10-CM    MS (multiple sclerosis) (Multi) G35    Abnormal gait R26.9    Right leg weakness R29.898    Spasticity R25.2       Subjective    Patient reports her kids are graduating from school soon, looking for jobs and deciding on graduate programs.    Precautions:   Precautions  STEADI Fall Risk Score (The score of 4 or more indicates an increased risk of falling): 5 Moderate Fall Risk based on L LE stiffness, weakness, impaired gait and coordination.  Significant falls history.    Pain  0/10 pain today.  L ankle has not bothered her since last report 2 weeks ago.    Objective    No functional measures performed today.    Treatments:  NEUROMUSCULAR REEDUCATION x 50 minutes  Patient entered and exited clinic ambulating with rolling walker and supervision 50' x 2.   Recumbent LE ergometer - LEs only  L1.0, 5 min forward, 5 min back  Reviewed previous HEP with  good accuracy and demonstration: 5 - 10 reps per.    - standing L/R LE quad stretch with L/R knee on chair, even and staggered stance.   - standing L/R PF stretch with foot on wedge, heel on floor.  Even and staggered stance, knees flexed/extended.   - seated L knee flexion/HS curl in dangle with red TB - tension with variable resistance facilitating ecentric, concentric and isometric HS contractions.   - seated bolster rolls - R/L, HS curl.   - cued for ballistic HS curl with increased force and range   L DF activation/flexibility   - able to tolerate standing on wedge for PF stretch - achieves 10 degrees DF passively   - seated active DF - 0-5 degrees in sitting 90/90.   - seated with heel propped on floor -10-0 degrees DF  LAQ - ballistic ball kick  Gait with rolling walker    - 400' x1, 200' x 1   - cues forearlier DF with R ankle when fatigue makes toe drag/catch more frequent.    Not performed on 3/18/2025   - seated L LAQ/SLR once roll reaches max distance from seat R active, R Active assisted  Standing mini squats at counter with mimicked open cabinet door for improved knee clearance.   Gait exercises in parallel bars:  - Side stepping with cued R knee flexion prior to attempted lift/motion. - 10' x 3 each R/L   - lunge walk - 10' x 4   - retro walk with emphasis on heel lift and knee flexion prior to step back - 10'x4.    OP EDUCATION:   Review and demonstration of exercises indicated good recall of HEP - continue/restart.  Patient voices understanding and intended compliance.      Goals:  Active       PT Problem       TUG (Met)       Start:  09/17/24    Expected End:  05/30/25    Resolved:  03/18/25    Improve TUG time to < 18.5 secs with rollator         10 meter walk  (Met)       Start:  09/17/24    Expected End:  05/30/25    Resolved:  03/18/25    Improve 10 meter walk to < 18.0 secs         Gait (Met)       Start:  09/17/24    Expected End:  05/30/25    Resolved:  03/18/25    Patient will ambulate with  greater right foot clearance, and less compensation for right swing. She will ambulate 210 ft with < 35 instances of toe drag/catching to improve safety and efficiency.          Right ankle Dorsiflexion (Progressing)       Start:  09/17/24    Expected End:  05/30/25       Right ankle DF PROM with knee extended > neutral, and knee flexed > 6 deg;            HEP (Met)       Start:  09/17/24    Expected End:  05/30/25    Resolved:  03/18/25    Patient will be independent with HEP to maximize her functional mobility         Patient stated goal (Progressing)       Start:  09/17/24    Expected End:  12/01/24       Reduce tightness and walk better

## 2025-04-15 ENCOUNTER — APPOINTMENT (OUTPATIENT)
Dept: PHYSICAL THERAPY | Facility: CLINIC | Age: 55
End: 2025-04-15
Payer: COMMERCIAL

## 2025-04-18 ENCOUNTER — OFFICE VISIT (OUTPATIENT)
Dept: NEUROLOGY | Facility: HOSPITAL | Age: 55
End: 2025-04-18
Payer: COMMERCIAL

## 2025-04-18 VITALS
RESPIRATION RATE: 18 BRPM | WEIGHT: 210 LBS | SYSTOLIC BLOOD PRESSURE: 126 MMHG | BODY MASS INDEX: 33.75 KG/M2 | DIASTOLIC BLOOD PRESSURE: 84 MMHG | TEMPERATURE: 97.1 F | HEIGHT: 66 IN | HEART RATE: 83 BPM

## 2025-04-18 DIAGNOSIS — G35 MULTIPLE SCLEROSIS (MULTI): Primary | ICD-10-CM

## 2025-04-18 PROCEDURE — 99214 OFFICE O/P EST MOD 30 MIN: CPT | Performed by: PSYCHIATRY & NEUROLOGY

## 2025-04-18 PROCEDURE — 3079F DIAST BP 80-89 MM HG: CPT | Performed by: PSYCHIATRY & NEUROLOGY

## 2025-04-18 PROCEDURE — 3008F BODY MASS INDEX DOCD: CPT | Performed by: PSYCHIATRY & NEUROLOGY

## 2025-04-18 PROCEDURE — 1036F TOBACCO NON-USER: CPT | Performed by: PSYCHIATRY & NEUROLOGY

## 2025-04-18 PROCEDURE — 3074F SYST BP LT 130 MM HG: CPT | Performed by: PSYCHIATRY & NEUROLOGY

## 2025-04-18 ASSESSMENT — PAIN SCALES - GENERAL: PAINLEVEL_OUTOF10: 0-NO PAIN

## 2025-04-18 NOTE — PROGRESS NOTES
Subjective   Shu Dimas is a 55 y.o. year old female that presents for follow-up visit for multiple sclerosis.    History of Present Illness   DISEASE SUMMARY/DIAGNOSTICS:  Onset: ~   Diagnosis of MS:   Previous disease therapies: IVMP May 2016, Copaxone  Current disease therapies: Resumed Ocrevus, resumed 2023 new induction after chemo, last infusion 2024  Most recent MRI brain: 2023 vs 2021, stable inactive  Most recent MRI cervical spine: 2019: stable, no changes compared to old scans  Most recent MRI thoracic spine: 11/15  CSF: 16, prot 50, high IgG index and synthesis rate, pos OCB  g/22 493 (stable)    Last seen 10/24  Had surgery on the left foot in 2024. Had GI infection after that in July, followed by respiratory issues, feeling better with antibiotics. Then, while in [place], got Covid in August, cannot get rid of the cough, following with PCP, says next step is seeing pulmonary. Lately also had a bad UTI for which she had Ab for 2 weeks, has not had one in a while and thinks was due to long trip in the car. Only got 2 PT sessions b/o all of the above. Not sure if Botox is even helping for her spasticity.    Interval History:   Ended up in the hospital for 2 days after a flu complicated by PNA, was 2 days in the hospital, not [facility].  Saw Dr Noonan  for Botox which is still helping. Feels she is walking better, also doing PT, feels she clears her feet more easily. Has PT until the end of May. Will be getting botox from Dani Giron in the future.   A pulmonologist has her on albuterol, no further infections. Tried steroids, abx, eventually cleared the cought that was lasting for months.     Patient Active Problem List   Diagnosis    Multiple sclerosis (Multi)    Drug-induced immunodeficiency (Multi)    Atypical nevus    Otalgia    Abnormal gait    Abnormal mammogram    Anemia    Breast cancer (Multi)    Chronic back pain    Chronic neck pain    Chronic GERD     "Gastroesophageal reflux disease without esophagitis    Decreased coordination    Decreased  strength of right hand    Disease of spinal cord (Multi)    Dysfunctional voiding of urine    Dysphagia    Dysuria    Eosinophilic esophagitis    Essential hypertension    History of hysterectomy    Impacted cerumen of both ears    Inclusion cyst    Lateral epicondylitis of left elbow    Lumbar radiculopathy    Lumbar stenosis    Nerve root disorder    Radiculopathy    Spinal stenosis of cervical region    Morbid obesity (Multi)    Neurogenic bladder    Postherpetic neuralgia    Neuropathy    Sensory disturbance    Somatic dysfunction of thoracic region    Urge incontinence of urine    Vitamin B12 deficiency    Hypogammaglobulinemia, acquired (Multi)    Impaired motor control    Right leg weakness      RX Allergies[1]     Current Medications[2]     Objective   /84   Pulse 83   Temp 36.2 °C (97.1 °F)   Resp 18   Ht 1.676 m (5' 6\")   Wt 95.3 kg (210 lb)   BMI 33.89 kg/m²     Neurological Exam  MENTAL STATE: Orientation was normal to person, place, situaiton, date. Recent and remote memory was intact. Attention span and concentration were normal. Language normal for comprehension, naming, repetition and expression. General fund of knowledge was intact.   CRANIAL NERVES:   CN 3, 4, 6 EOMs normal alignment, full range with normal saccades, pursuit and convergence.   CN 5 Facial sensation intact bilaterally to light touch  CN 7 Normal and symmetric facial strength. Nasolabial folds symmetric.   CN 8 Hearing intact to conversation.                     R L  Delt           5 5  Bicep         5 5  Tricep        5 5   Wrist Flex  5 5   Wrist Ext  5 5             5 5  Finger abd 4+ 5  APB  4 5    Mild tremor intention on UE b/l  No clear spasticity today.  GAIT: Paretic gait with right foot drop. Uses a walker.     Assessment/Plan   This is a 55 y.o. year old  right handed woman with a history of primary " progressive multiple sclerosis, breast cancer (s/p chemotherapy and radiation 2023), HTN, GERD, who presents follow-up appointment for multiple sclerosis.  She has been doing well recently and her legs strength and gait have improved a bit with PT. Had one episode of flu and PNA in the last 6 months. I think it is ok to c/w Ocrevus.  Will keep monitoring IgG.  Will repeat an MRI brain next in June 2025.  She will c/w physical therapy.    Diagnoses and all orders for this visit:  Multiple sclerosis (Multi) (Primary)  -     MR brain w and wo IV contrast; Future  -     Follow Up In Neurology; Future                 [1] No Known Allergies  [2]   Current Outpatient Medications:     albuterol 2.5 mg /3 mL (0.083 %) nebulizer solution, Take 3 mL (2.5 mg) by nebulization 2 times a day., Disp: , Rfl:     ALBUTEROL INHL, Inhale., Disp: , Rfl:     bisoproloL-hydrochlorothiazide (Ziac) 5-6.25 mg tablet, TAKE 1 TABLET BY MOUTH IN THE MORNING, Disp: , Rfl:     cholecalciferol (Vitamin D-3) 50 mcg (2,000 unit) capsule, Take 3 capsules (150 mcg) by mouth once daily., Disp: , Rfl:     dalfampridine (Ampyra) 10 mg tablet extended release 12 hr 12 hr tablet, Take 1 tablet (10 mg) by mouth 2 times a day., Disp: 60 tablet, Rfl: 5    diphenhydrAMINE (Benadryl Allergy) 25 mg tablet, Benadryl Allergy 25 MG Oral Tablet  Refills: 0     Active, Disp: , Rfl:     fluticasone (Flonase) 50 mcg/actuation nasal spray, Administer 1 spray into each nostril once daily. Shake gently. Before first use, prime pump. After use, clean tip and replace cap., Disp: , Rfl:     guaiFENesin (Mucinex) 600 mg 12 hr tablet, Take 1 tablet (600 mg) by mouth 2 times a day., Disp: , Rfl:     mirabegron (Myrbetriq) 50 mg tablet extended release 24 hr 24 hr tablet, Take 1 tablet (50 mg) by mouth once daily at bedtime., Disp: 90 tablet, Rfl: 3    montelukast (Singulair) 10 mg tablet, Take 1 tablet (10 mg) by mouth once daily at bedtime., Disp: , Rfl:     multivitamin  tablet, Take 1 tablet by mouth once daily., Disp: , Rfl:     ocrelizumab (Ocrevus) 30 mg/mL, Infuse 20 mL (600 mg total) into a venous catheter every 6 months., Disp: 20 mL, Rfl: 1    omeprazole (PriLOSEC) 20 mg DR capsule, Take 1 capsule (20 mg) by mouth once daily., Disp: , Rfl:     ondansetron (Zofran) 4 mg/2 mL injection, Infuse 4 mg IVPB once as needed for nausea and vomiting during Ocrevus infusion. Withdraw 4mg and add to 50 mL of normal saline. Infuse over 15 minutes., Disp: , Rfl:     polyethylene glycol (Miralax) 17 gram/dose powder, MIX 1 CAPFUL (17GM) IN 8 OUNCES OF WATER, JUICE, OR TEA AND DRINK DAILY., Disp: , Rfl:     tiZANidine (Zanaflex) 2 mg tablet, Take one daily, Disp: 90 tablet, Rfl: 3    Current Facility-Administered Medications:     onabotulinumtoxinA (Botox) injection 200 Units, 200 Units, intramuscular, Once, Bobby Noonan MD

## 2025-04-22 ENCOUNTER — TREATMENT (OUTPATIENT)
Dept: PHYSICAL THERAPY | Facility: CLINIC | Age: 55
End: 2025-04-22
Payer: COMMERCIAL

## 2025-04-22 DIAGNOSIS — R29.898 RIGHT LEG WEAKNESS: ICD-10-CM

## 2025-04-22 DIAGNOSIS — R25.2 SPASTICITY: ICD-10-CM

## 2025-04-22 DIAGNOSIS — G35 MS (MULTIPLE SCLEROSIS) (MULTI): ICD-10-CM

## 2025-04-22 DIAGNOSIS — R26.9 ABNORMAL GAIT: ICD-10-CM

## 2025-04-22 PROCEDURE — 97112 NEUROMUSCULAR REEDUCATION: CPT | Mod: GP | Performed by: PHYSICAL THERAPIST

## 2025-04-22 NOTE — PROGRESS NOTES
Physical Therapy    Physical Therapy Treatment    Patient Name: Shu Dimas  MRN: [id number]  Today's Date: 4/22/2025  Time Calculation  Start Time: 1030  Stop Time: 1125  Time Calculation (min): 55 min     PT Therapeutic Procedures Time Entry  Neuromuscular Re-Education Time Entry: 55       Visit number: 10 in 2025   Insurance: [place]  Plan of Care Dates: 3/21/2025 - 5/30/2025; 12/17/2024 - 3/20/2025; 9/17/2024 - 12/16/2024  Authorized visits: 30 PT per year ([place])   Primary diagnosis: R LE weakness    Assessment:   Patient presents for follow-up PT session this date.  She reports she has been doing her exercises, but not much walking outside the house.  No falls.  Patient tolerated all therapeutic activities well with out increase in pain.  Patient will continue to benefit from skilled PT to continue to improve strength, balance, gait, and overall functional mobility.    Plan: Continue per plan of care.  With focus on stretching and motor control as well as R LE strengthening throughout.     Current Problem  Problem List Items Addressed This Visit           ICD-10-CM    MS (multiple sclerosis) (Multi) G35    Abnormal gait R26.9    Right leg weakness R29.898    Spasticity R25.2       Subjective    Patient reports she went to see Neurologist who said her R ankle is stronger and she is walking better.    Precautions:     Moderate Fall Risk based on L LE stiffness, weakness, impaired gait and coordination.  Significant falls history.    Pain  0/10 pain today.     Objective    No functional measures performed today.    Treatments:  NEUROMUSCULAR REEDUCATION x 50 minutes  Patient entered and exited clinic ambulating with rolling walker and supervision 50' x 2.   Recumbent LE ergometer - LEs only  L1.0, 5 min forward, 5 min back  Reviewed previous HEP with good accuracy and demonstration: 5 - 10 reps per.    - standing L/R LE quad stretch with L/R knee on chair, even and staggered stance.   - standing L/R PF stretch with  foot on wedge, heel on floor.  Even and staggered stance, knees flexed/extended.   - seated L knee flexion/HS curl in dangle with red TB - tension with variable resistance facilitating ecentric, concentric and isometric HS contractions.   - seated bolster rolls - R/L, HS curl.   - cued for ballistic HS curl with increased force and range   L DF activation/flexibility   - able to tolerate standing on wedge for PF stretch - achieves 10 degrees DF passively   - seated active DF - 0-5 degrees in sitting 90/90.   - seated with heel propped on floor -10-0 degrees DF  LAQ - ballistic ball kick  Gait with rolling walker    - 400' x1, 200' x 1   - cues forearlier DF with R ankle when fatigue makes toe drag/catch more frequent.    Not performed on 4/22/2025   - seated L LAQ/SLR once roll reaches max distance from seat R active, R Active assisted  Standing mini squats at counter with mimicked open cabinet door for improved knee clearance.   Gait exercises in parallel bars:  - Side stepping with cued R knee flexion prior to attempted lift/motion. - 10' x 3 each R/L   - lunge walk - 10' x 4   - retro walk with emphasis on heel lift and knee flexion prior to step back - 10'x4.    OP EDUCATION:   Review and demonstration of exercises indicated good recall of HEP - continue/restart.  Patient voices understanding and intended compliance.      Goals:  Active       PT Problem       TUG (Met)       Start:  09/17/24    Expected End:  05/30/25    Resolved:  03/18/25    Improve TUG time to < 18.5 secs with rollator         10 meter walk  (Met)       Start:  09/17/24    Expected End:  05/30/25    Resolved:  03/18/25    Improve 10 meter walk to < 18.0 secs         Gait (Met)       Start:  09/17/24    Expected End:  05/30/25    Resolved:  03/18/25    Patient will ambulate with greater right foot clearance, and less compensation for right swing. She will ambulate 210 ft with < 35 instances of toe drag/catching to improve safety and  efficiency.          Right ankle Dorsiflexion (Progressing)       Start:  09/17/24    Expected End:  05/30/25       Right ankle DF PROM with knee extended > neutral, and knee flexed > 6 deg;            HEP (Met)       Start:  09/17/24    Expected End:  05/30/25    Resolved:  03/18/25    Patient will be independent with HEP to maximize her functional mobility         Patient stated goal (Progressing)       Start:  09/17/24    Expected End:  12/01/24       Reduce tightness and walk better

## 2025-04-29 ENCOUNTER — TREATMENT (OUTPATIENT)
Dept: PHYSICAL THERAPY | Facility: CLINIC | Age: 55
End: 2025-04-29
Payer: COMMERCIAL

## 2025-04-29 DIAGNOSIS — R26.9 ABNORMAL GAIT: ICD-10-CM

## 2025-04-29 DIAGNOSIS — R29.898 RIGHT LEG WEAKNESS: ICD-10-CM

## 2025-04-29 DIAGNOSIS — G35 MS (MULTIPLE SCLEROSIS) (MULTI): ICD-10-CM

## 2025-04-29 DIAGNOSIS — R25.2 SPASTICITY: ICD-10-CM

## 2025-04-29 PROCEDURE — 97112 NEUROMUSCULAR REEDUCATION: CPT | Mod: GP | Performed by: PHYSICAL THERAPIST

## 2025-04-29 NOTE — PROGRESS NOTES
Physical Therapy    Physical Therapy Treatment    Patient Name: Shu Dimas  MRN: [id number]  Today's Date: 4/29/2025  Time Calculation  Start Time: 0930  Stop Time: 1025  Time Calculation (min): 55 min     PT Therapeutic Procedures Time Entry  Neuromuscular Re-Education Time Entry: 55       Visit number: 11 in 2025 (last assessment 3/18/2025)  Insurance: [place]  Plan of Care Dates: 3/21/2025 - 5/30/2025; 12/17/2024 - 3/20/2025; 9/17/2024 - 12/16/2024  Authorized visits: 30 PT per year ([place]   Primary diagnosis: R LE weakness    Assessment:   Patient presents for follow-up PT session this date.  She reports she has been doing her exercises, but not much walking outside the house.  No falls.  Patient tolerated all therapeutic activities well with out increase in pain.  Patient will continue to benefit from skilled PT to continue to improve strength, balance, gait, and overall functional mobility.    Plan: Continue per plan of care.  With focus on stretching and motor control as well as R LE strengthening throughout.     Current Problem  Problem List Items Addressed This Visit           ICD-10-CM    MS (multiple sclerosis) (Multi) G35    Abnormal gait R26.9    Right leg weakness R29.898    Spasticity R25.2       Subjective    Patient reports she continues to work on using reciprocating pattern up and down steps and it feels like it is easier.    Precautions:     Moderate Fall Risk based on L LE stiffness, weakness, impaired gait and coordination.  Significant falls history.    Pain  0/10 pain today.     Objective    No functional measures performed today.    Treatments:  NEUROMUSCULAR REEDUCATION x 55 minutes  Patient entered and exited clinic ambulating with rolling walker and supervision 50' x 2.   Recumbent stepper - LEs only L2.5 , 8 minutes  Reviewed previous HEP with good accuracy and demonstration: 5 - 10 reps per.    - standing L/R LE quad stretch with L/R knee on seat of chair, even and staggered stance.   -  "standing L/R PF stretch with foot on wedge, heel on floor.  Even and staggered stance, knees flexed/extended.   - seated L knee flexion/HS curl in dangle with red TB - tension with variable resistance facilitating ecentric, concentric and isometric HS contractions.   - seated bolster rolls - R/L, HS curl.   - cued for ballistic HS curl with increased force and range   L DF activation/flexibility   - able to tolerate standing on wedge for PF stretch - achieves 10 degrees DF passively   - seated active DF - 0-5 degrees in sitting 90/90.   - seated with heel propped on floor -10-0 degrees DF  Gait with rolling walker    - 400' x1, (second 200' at faster speed)   - cues for earlier DF with R ankle when fatigue makes toe drag/catch more frequent  4\" step ups in parallel bars   - R lead up, L lead down    Not performed on 4/22/2025   - seated L LAQ/SLR once roll reaches max distance from seat R active, R Active assisted  Standing mini squats at counter with mimicked open cabinet door for improved knee clearance.   Gait exercises in parallel bars:  - Side stepping with cued R knee flexion prior to attempted lift/motion. - 10' x 3 each R/L   - lunge walk - 10' x 4   - retro walk with emphasis on heel lift and knee flexion prior to step back - 10'x4.    OP EDUCATION:   Review and demonstration of exercises indicated good recall of HEP - continue/restart.  Patient voices understanding and intended compliance.      Goals:  Active       PT Problem       TUG (Met)       Start:  09/17/24    Expected End:  05/30/25    Resolved:  03/18/25    Improve TUG time to < 18.5 secs with rollator         10 meter walk  (Met)       Start:  09/17/24    Expected End:  05/30/25    Resolved:  03/18/25    Improve 10 meter walk to < 18.0 secs         Gait (Met)       Start:  09/17/24    Expected End:  05/30/25    Resolved:  03/18/25    Patient will ambulate with greater right foot clearance, and less compensation for right swing. She will ambulate " 210 ft with < 35 instances of toe drag/catching to improve safety and efficiency.          Right ankle Dorsiflexion (Progressing)       Start:  09/17/24    Expected End:  05/30/25       Right ankle DF PROM with knee extended > neutral, and knee flexed > 6 deg;            HEP (Met)       Start:  09/17/24    Expected End:  05/30/25    Resolved:  03/18/25    Patient will be independent with HEP to maximize her functional mobility         Patient stated goal (Progressing)       Start:  09/17/24    Expected End:  12/01/24       Reduce tightness and walk better

## 2025-05-05 PROCEDURE — RXMED WILLOW AMBULATORY MEDICATION CHARGE

## 2025-05-06 ENCOUNTER — TREATMENT (OUTPATIENT)
Dept: PHYSICAL THERAPY | Facility: CLINIC | Age: 55
End: 2025-05-06
Payer: COMMERCIAL

## 2025-05-06 DIAGNOSIS — R26.9 ABNORMAL GAIT: ICD-10-CM

## 2025-05-06 DIAGNOSIS — R29.898 RIGHT LEG WEAKNESS: ICD-10-CM

## 2025-05-06 DIAGNOSIS — G35 MS (MULTIPLE SCLEROSIS) (MULTI): ICD-10-CM

## 2025-05-06 DIAGNOSIS — R25.2 SPASTICITY: ICD-10-CM

## 2025-05-06 PROCEDURE — 97112 NEUROMUSCULAR REEDUCATION: CPT | Mod: GP | Performed by: PHYSICAL THERAPIST

## 2025-05-06 NOTE — PROGRESS NOTES
"Physical Therapy    Physical Therapy Treatment    Patient Name: Shu Dimas  MRN: [id number]  Today's Date: 5/6/2025  Time Calculation  Start Time: 0930  Stop Time: 1020  Time Calculation (min): 50 min     PT Therapeutic Procedures Time Entry  Neuromuscular Re-Education Time Entry: 50       Visit number: 12 in 2025 (last assessment 3/18/2025)  Insurance: [place]  Plan of Care Dates: 3/21/2025 - 5/30/2025; 12/17/2024 - 3/20/2025; 9/17/2024 - 12/16/2024  Authorized visits: 30 PT per year ([place])   Primary diagnosis: R LE weakness    Assessment:   Patient presents for follow-up PT session this date.  She reports she has been doing some of her stretches but not everyday because of the traveling this week.  No falls.  Patient tolerated all therapeutic activities well without increase in pain.  Patient will continue to benefit from skilled PT to continue to improve strength, balance, gait, and overall functional mobility.    Plan: Continue per plan of care.  With focus on stretching and motor control as well as R LE strengthening throughout.     Current Problem  Problem List Items Addressed This Visit           ICD-10-CM    MS (multiple sclerosis) (Multi) G35    Abnormal gait R26.9    Right leg weakness R29.898    Spasticity R25.2       Subjective    Patient reports she had a busy weekend going to out of town graduation ceremonies and parties.  \" I feel like walking, exercises and going up and down the steps are getting easier.    Precautions:   Precautions  STEADI Fall Risk Score (The score of 4 or more indicates an increased risk of falling): 5 Moderate Fall Risk based on L LE stiffness, weakness, impaired gait and coordination.  Significant falls history.    Pain  0/10 pain today.     Objective    No functional measures performed today.    Treatments:  NEUROMUSCULAR REEDUCATION x 55 minutes  Patient entered and exited clinic ambulating with rolling walker and supervision 50' x 2.   Recumbent stepper - LEs only L2.5 , 8 " minutes  Reviewed previous HEP with good accuracy and demonstration: 5 - 10 reps per.    - standing L/R LE quad stretch with L/R knee on seat of chair, even and staggered stance.   - standing L/R PF stretch with foot on wedge, heel on floor.  Even and staggered stance, knees flexed/extended.   - seated L knee flexion/HS curl in dangle with red TB - tension with variable resistance facilitating ecentric, concentric and isometric HS contractions.   - seated bolster rolls - R/L, HS curl.   - cued for ballistic HS curl with increased force and range   L DF activation/flexibility   - able to tolerate standing on wedge for PF stretch - achieves 10 degrees DF passively   - seated active DF - 0-5 degrees in sitting 90/90.   - seated with heel propped on floor -10-0 degrees DF  Gait with rolling walker    - 400' x1, (second 200' at faster speed)   - cues for earlier DF with R ankle when fatigue makes toe drag/catch more frequent   - Side stepping with cued R knee flexion prior to attempted lift/motion. - 8' x 5 each R/L      Not performed on 4/22/2025   - seated L LAQ/SLR once roll reaches max distance from seat R active, R Active assisted  Standing mini squats at counter with mimicked open cabinet door for improved knee clearance.   Gait exercises in parallel bars:   - lunge walk - 10' x 4   - retro walk with emphasis on heel lift and knee flexion prior to step back - 10'x4.    OP EDUCATION:   Review and demonstration of exercises indicated good recall of HEP - continue/restart.  Patient voices understanding and intended compliance.      Goals:  Active       PT Problem       TUG (Met)       Start:  09/17/24    Expected End:  05/30/25    Resolved:  03/18/25    Improve TUG time to < 18.5 secs with rollator         10 meter walk  (Met)       Start:  09/17/24    Expected End:  05/30/25    Resolved:  03/18/25    Improve 10 meter walk to < 18.0 secs         Gait (Met)       Start:  09/17/24    Expected End:  05/30/25    Resolved:   03/18/25    Patient will ambulate with greater right foot clearance, and less compensation for right swing. She will ambulate 210 ft with < 35 instances of toe drag/catching to improve safety and efficiency.          Right ankle Dorsiflexion (Progressing)       Start:  09/17/24    Expected End:  05/30/25       Right ankle DF PROM with knee extended > neutral, and knee flexed > 6 deg;            HEP (Met)       Start:  09/17/24    Expected End:  05/30/25    Resolved:  03/18/25    Patient will be independent with HEP to maximize her functional mobility         Patient stated goal (Progressing)       Start:  09/17/24    Expected End:  12/01/24       Reduce tightness and walk better

## 2025-05-08 ENCOUNTER — PHARMACY VISIT (OUTPATIENT)
Dept: PHARMACY | Facility: CLINIC | Age: 55
End: 2025-05-08
Payer: COMMERCIAL

## 2025-05-13 ENCOUNTER — TREATMENT (OUTPATIENT)
Dept: PHYSICAL THERAPY | Facility: CLINIC | Age: 55
End: 2025-05-13
Payer: COMMERCIAL

## 2025-05-13 DIAGNOSIS — R29.898 RIGHT LEG WEAKNESS: ICD-10-CM

## 2025-05-13 DIAGNOSIS — G35 MS (MULTIPLE SCLEROSIS) (MULTI): ICD-10-CM

## 2025-05-13 DIAGNOSIS — R26.9 ABNORMAL GAIT: ICD-10-CM

## 2025-05-13 DIAGNOSIS — R25.2 SPASTICITY: ICD-10-CM

## 2025-05-13 PROCEDURE — 97112 NEUROMUSCULAR REEDUCATION: CPT | Mod: GP | Performed by: PHYSICAL THERAPIST

## 2025-05-13 NOTE — PROGRESS NOTES
"Physical Therapy    Physical Therapy Treatment    Patient Name: Suh Dimas  MRN: [id number]  Today's Date: 5/13/2025  Time Calculation  Start Time: 0930  Stop Time: 1015  Time Calculation (min): 45 min     PT Therapeutic Procedures Time Entry  Neuromuscular Re-Education Time Entry: 45       Visit number: 13 in 2025 (last assessment 3/18/2025)  Insurance: [employer]  Plan of Care Dates: 3/21/2025 - 5/30/2025; 12/17/2024 - 3/20/2025; 9/17/2024 - 12/16/2024  Authorized visits: 30 PT per year ([place])   Primary diagnosis: R LE weakness    Assessment:   Patient presents for follow-up PT session this date.  She reports she has been doing some of her stretches but not everyday because of the traveling this week.  No falls, lots of walking.  Patient tolerated all therapeutic activities well without increase in pain.  Patient will continue to benefit from skilled PT to continue to improve strength, balance, gait, and overall functional mobility.    Plan: Continue per plan of care.  With focus on stretching and motor control as well as R LE strengthening throughout.     Current Problem  Problem List Items Addressed This Visit           ICD-10-CM    MS (multiple sclerosis) (Multi) G35    Abnormal gait R26.9    Right leg weakness R29.898    Spasticity R25.2       Subjective    Patient reports she had a busy weekend going to out of town graduation celebration, moving son out of college a, mass and mother's day.  \" I feel like walking, exercises and going up and down the steps are getting easier.\"    Precautions:     Moderate Fall Risk based on L LE stiffness, weakness, impaired gait and coordination.  Significant falls history.    Pain  0/10 pain today.     Objective    No functional measures performed today.    Treatments:  NEUROMUSCULAR REEDUCATION x 45 minutes  Patient entered and exited clinic ambulating with rolling walker and supervision 50' x 2.   Recumbent LE ergometer - LEs only L2.5 , 5 minutes F, 5 minutes B.  Reviewed " "previous HEP with good accuracy and demonstration: 5 - 10 reps per.    - standing L/R LE quad stretch with L/R knee on seat of chair, even and staggered stance.   - standing L/R PF stretch with foot on wedge, heel on floor.  Even and staggered stance, knees flexed/extended.   - seated L knee flexion/HS curl in dangle with red TB - tension with variable resistance facilitating ecentric, concentric and isometric HS contractions.   - seated bolster rolls - R/L, HS curl.   - cued for ballistic HS curl with increased force and range, red TB.  L DF activation/flexibility   - able to tolerate standing on wedge with heel on floor for PF stretch - achieves 10 degrees DF passively  Gait with rolling walker    - 400' x1, (second 200' at faster speed)   - cues for earlier DF with R ankle when fatigue makes toe drag/catch more frequent  Stair climbing - 12 6\" steps,  2 rails with recip pattern up and down.   Side stepping with cued R knee flexion prior to attempted lift/motion. - 10' x 2each R/L      Not performed on 4/22/2025   - seated L LAQ/SLR once roll reaches max distance from seat R active, R Active assisted  Standing mini squats at counter with mimicked open cabinet door for improved knee clearance.   Gait exercises in parallel bars:   - lunge walk - 10' x 4   - retro walk with emphasis on heel lift and knee flexion prior to step back - 10'x4.    OP EDUCATION:   Review and demonstration of exercises indicated good recall of HEP - continue/restart.  Patient voices understanding and intended compliance.      Goals:  Active       PT Problem       TUG (Met)       Start:  09/17/24    Expected End:  05/30/25    Resolved:  03/18/25    Improve TUG time to < 18.5 secs with rollator         10 meter walk  (Met)       Start:  09/17/24    Expected End:  05/30/25    Resolved:  03/18/25    Improve 10 meter walk to < 18.0 secs         Gait (Met)       Start:  09/17/24    Expected End:  05/30/25    Resolved:  03/18/25    Patient will " ambulate with greater right foot clearance, and less compensation for right swing. She will ambulate 210 ft with < 35 instances of toe drag/catching to improve safety and efficiency.          Right ankle Dorsiflexion (Progressing)       Start:  09/17/24    Expected End:  05/30/25       Right ankle DF PROM with knee extended > neutral, and knee flexed > 6 deg;            HEP (Met)       Start:  09/17/24    Expected End:  05/30/25    Resolved:  03/18/25    Patient will be independent with HEP to maximize her functional mobility         Patient stated goal (Progressing)       Start:  09/17/24    Expected End:  12/01/24       Reduce tightness and walk better

## 2025-05-19 ENCOUNTER — TELEPHONE (OUTPATIENT)
Dept: NEUROLOGY | Facility: CLINIC | Age: 55
End: 2025-05-19
Payer: COMMERCIAL

## 2025-05-19 NOTE — TELEPHONE ENCOUNTER
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

## 2025-05-19 NOTE — TELEPHONE ENCOUNTER
[place] [id number]  Per [place] the auth on file for  follows the member. No update of new review is required when the member changes providers and/or facilities.  BOTOX 300u buy&bill  Spastic Quedriparesis  1/23/24 - 1/22/26  Auth#[id number]

## 2025-05-20 ENCOUNTER — TREATMENT (OUTPATIENT)
Dept: PHYSICAL THERAPY | Facility: CLINIC | Age: 55
End: 2025-05-20
Payer: COMMERCIAL

## 2025-05-20 DIAGNOSIS — R25.2 SPASTICITY: ICD-10-CM

## 2025-05-20 DIAGNOSIS — R26.9 ABNORMAL GAIT: ICD-10-CM

## 2025-05-20 DIAGNOSIS — R29.898 RIGHT LEG WEAKNESS: ICD-10-CM

## 2025-05-20 DIAGNOSIS — G35 MS (MULTIPLE SCLEROSIS) (MULTI): ICD-10-CM

## 2025-05-20 PROCEDURE — 97112 NEUROMUSCULAR REEDUCATION: CPT | Mod: GP | Performed by: PHYSICAL THERAPIST

## 2025-05-20 NOTE — PROGRESS NOTES
Physical Therapy    Physical Therapy Treatment    Patient Name: Shu Dimas  MRN: [id number]  Today's Date: 5/20/2025  Time Calculation  Start Time: 0930  Stop Time: 1025  Time Calculation (min): 55 min     PT Therapeutic Procedures Time Entry  Neuromuscular Re-Education Time Entry: 55       Visit number: 14 in 2025 (last assessment 3/18/2025)  Insurance: [place]  Plan of Care Dates: 3/21/2025 - 5/30/2025; 12/17/2024 - 3/20/2025; 9/17/2024 - 12/16/2024  Authorized visits: 30 PT per year ([place])   Primary diagnosis: R LE weakness    Assessment:   Patient presents for follow-up PT session this date.  She reports she has been doing more of her stretch this week with less travel.  No falls, lots of walking.  Nice Patient tolerated all therapeutic activities well without increase in pain.  Patient will continue to benefit from skilled PT to continue to improve strength, balance, gait, and overall functional mobility.    Plan: Continue per plan of care.  With focus on stretching and motor control as well as R LE strengthening throughout.     Current Problem  Problem List Items Addressed This Visit           ICD-10-CM    MS (multiple sclerosis) (Multi) G35    Abnormal gait R26.9    Right leg weakness R29.898    Spasticity R25.2       Subjective    Patient reports she has settled back into her routine at home with less travel this week.  Continues with HEP and more walking around the house this week.    Precautions:     Moderate Fall Risk based on L LE stiffness, weakness, impaired gait and coordination.  Significant falls history.    Pain  0/10 pain today.     Objective    No functional measures performed today.    Treatments:  NEUROMUSCULAR REEDUCATION x 55 minutes  Patient entered and exited clinic ambulating with rolling walker and supervision 50' x 2.   Recumbent LE ergometer - LEs only L2.5 , 5 minutes F, 5 minutes B.  Reviewed previous HEP with good accuracy and demonstration: 5 - 10 reps per.    - standing L/R LE quad  stretch with L/R knee on seat of chair, even and staggered stance.   - standing L/R PF stretch with foot on wedge, heel on floor.  Even and staggered stance, knees flexed/extended.   - seated L knee flexion/HS curl in dangle with red TB - tension with variable resistance facilitating ecentric, concentric and isometric HS contractions.   - seated bolster rolls - R/L, HS curl.   - cued for ballistic HS curl with increased force and range, red TB.  L DF activation/flexibility   - able to tolerate standing on wedge with heel on floor for PF stretch - achieves 10 degrees DF passively  Gait with rolling walker    - 400' x1, (second 200' at faster speed)   - cues for earlier DF with R ankle when fatigue makes toe drag/catch more frequent  ADDED into HEP:   - modified bridge push with feet on floor, push hips forward and up lifting buttocks off chair    - seated hamstring stretch with heel propped on rollator seat.      Not performed on 4/22/2025   - seated L LAQ/SLR once roll reaches max distance from seat R active, R Active assisted  Standing mini squats at counter with mimicked open cabinet door for improved knee clearance.   Gait exercises in parallel bars:   - lunge walk - 10' x 4   - retro walk with emphasis on heel lift and knee flexion prior to step back - 10'x4.    OP EDUCATION:   Review and demonstration of exercises indicated good recall of HEP - continue/restart.  Patient voices understanding and intended compliance.      Goals:  Active       PT Problem       TUG (Met)       Start:  09/17/24    Expected End:  05/30/25    Resolved:  03/18/25    Improve TUG time to < 18.5 secs with rollator         10 meter walk  (Met)       Start:  09/17/24    Expected End:  05/30/25    Resolved:  03/18/25    Improve 10 meter walk to < 18.0 secs         Gait (Met)       Start:  09/17/24    Expected End:  05/30/25    Resolved:  03/18/25    Patient will ambulate with greater right foot clearance, and less compensation for right  swing. She will ambulate 210 ft with < 35 instances of toe drag/catching to improve safety and efficiency.          Right ankle Dorsiflexion (Progressing)       Start:  09/17/24    Expected End:  05/30/25       Right ankle DF PROM with knee extended > neutral, and knee flexed > 6 deg;            HEP (Met)       Start:  09/17/24    Expected End:  05/30/25    Resolved:  03/18/25    Patient will be independent with HEP to maximize her functional mobility         Patient stated goal (Progressing)       Start:  09/17/24    Expected End:  12/01/24       Reduce tightness and walk better

## 2025-05-23 ENCOUNTER — APPOINTMENT (OUTPATIENT)
Dept: RADIOLOGY | Facility: CLINIC | Age: 55
End: 2025-05-23
Payer: COMMERCIAL

## 2025-05-23 DIAGNOSIS — G35 MULTIPLE SCLEROSIS (MULTI): ICD-10-CM

## 2025-05-23 PROCEDURE — A9575 INJ GADOTERATE MEGLUMI 0.1ML: HCPCS | Mod: JZ | Performed by: PSYCHIATRY & NEUROLOGY

## 2025-05-23 PROCEDURE — 2550000001 HC RX 255 CONTRASTS: Mod: JZ | Performed by: PSYCHIATRY & NEUROLOGY

## 2025-05-23 PROCEDURE — 70553 MRI BRAIN STEM W/O & W/DYE: CPT | Performed by: RADIOLOGY

## 2025-05-23 PROCEDURE — 70553 MRI BRAIN STEM W/O & W/DYE: CPT

## 2025-05-23 RX ORDER — GADOTERATE MEGLUMINE 376.9 MG/ML
20 INJECTION INTRAVENOUS
Status: COMPLETED | OUTPATIENT
Start: 2025-05-23 | End: 2025-05-23

## 2025-05-23 RX ADMIN — GADOTERATE MEGLUMINE 20 ML: 376.9 INJECTION INTRAVENOUS at 09:11

## 2025-05-27 ENCOUNTER — APPOINTMENT (OUTPATIENT)
Dept: PHYSICAL THERAPY | Facility: CLINIC | Age: 55
End: 2025-05-27
Payer: COMMERCIAL

## 2025-06-04 PROCEDURE — RXMED WILLOW AMBULATORY MEDICATION CHARGE

## 2025-06-06 ENCOUNTER — PHARMACY VISIT (OUTPATIENT)
Dept: PHARMACY | Facility: CLINIC | Age: 55
End: 2025-06-06
Payer: COMMERCIAL

## 2025-06-13 ENCOUNTER — APPOINTMENT (OUTPATIENT)
Dept: NEUROLOGY | Facility: CLINIC | Age: 55
End: 2025-06-13
Payer: COMMERCIAL

## 2025-06-13 VITALS
HEIGHT: 66 IN | BODY MASS INDEX: 34.72 KG/M2 | DIASTOLIC BLOOD PRESSURE: 80 MMHG | SYSTOLIC BLOOD PRESSURE: 117 MMHG | WEIGHT: 216 LBS | HEART RATE: 87 BPM

## 2025-06-13 DIAGNOSIS — M62.838 MUSCLE SPASM: ICD-10-CM

## 2025-06-13 DIAGNOSIS — G81.10 SPASTIC HEMIPARESIS (MULTI): Primary | ICD-10-CM

## 2025-06-13 RX ORDER — LORATADINE 10 MG/1
10 TABLET ORAL
COMMUNITY
Start: 2024-09-23

## 2025-06-13 RX ORDER — BUDESONIDE, GLYCOPYRROLATE, AND FORMOTEROL FUMARATE 160; 9; 4.8 UG/1; UG/1; UG/1
AEROSOL, METERED RESPIRATORY (INHALATION)
COMMUNITY

## 2025-06-13 ASSESSMENT — PATIENT HEALTH QUESTIONNAIRE - PHQ9
2. FEELING DOWN, DEPRESSED OR HOPELESS: NOT AT ALL
1. LITTLE INTEREST OR PLEASURE IN DOING THINGS: NOT AT ALL
SUM OF ALL RESPONSES TO PHQ9 QUESTIONS 1 & 2: 0

## 2025-06-13 ASSESSMENT — ENCOUNTER SYMPTOMS
OCCASIONAL FEELINGS OF UNSTEADINESS: 1
LOSS OF SENSATION IN FEET: 0
DEPRESSION: 0

## 2025-06-13 NOTE — PROGRESS NOTES
Subjective []Expand by Default  HPI        She is a 55 y.o. right handed female patient with hx of MS, breast cancer s/p lumpectomy/radiation/chemotherapy, and HTN who is here for Botox, previous patient of Dr. Noonan        Interval HX: Feels Botox has been helping with walking. No hx of side effects. It has partly worn off recently.     Cause of spasticity: MS  Cerebral spasticity: possible  Spinal spasticity: yes  Mobility: walker  Transfers: on her own   Falls: yes twice a year  AFO:  could not walk with it. She has a heel lift on the left.      Spasticity interferes with residual function: yes spasticity affects walking.   Spasticity interferes with ROM and posture: ankle feels stiff but no abnormal posture  Spasticity causes pain and/or discomfort:  infrequent isometric spasms in the right calf, twice a year or so, painful, relieved by movement.   Spasticity interferes with hygiene or skin care: no   UTIs and last urine check: yes but no effect on spasticity.   temperature effect: very cold weather causes her whole body to be tight     Tonic spasms:   Flexor: no   Extensor: no   Adductor:  no   Isometric (cramps):   infrequent isometric spasms in the right calf, twice a year or so, painful, relieved by movement.   Complex (more than 1 at a time): no   Painful:  yes see above   Focal dystonia:   no   Myoclonus:  no  Spontaneous clonus: no     Tremor:  no   RLS:  no      Treatment:  Baclofen: took it in the past but stopped it because it wasn't helping. Does not recall the dose. could not tolerate a retry.   Tizanidine:  Tried but did not tolerate. Caused anxiety and restlessness and she was not able to sleep.  Dantrolene: not taking  Gabapentin: took it in the past for shingles   pregabalin: no   Tegretol:  no   Trileptal:  no   Anticholinergics: no    Dopamine agonist: no   Botulinum toxin: here to discuss.   Baclofen pump: no  Other treatments: no      PT: doing currently, helpful   OT: did in the past,  helpful   Stretching: yes daily   Exercise: no but used to do stationary bike in the past.      Goals of spasticity management:   Function: improve walking and gait.   ROM/posture:  no goals  Comfort:  no goals  Hygiene and skin care:  no goals.            Review of Systems  All other system have been reviewed and are negative for complaint.        Objective   Neurological Exam     Physical Exam                      Spasticity exam:     Modified Doris Scale (MAS)  Elbow Extension Score: Slight increase at the end of the range of motion  Elbow Flexion Score: No increase  Wrist Extension Score: No increase  Finger Extension Score: No increase  Thumb ABduction Score: No increase  Hamstring Extension Score: Slight increase, minimal resistance throughout the remainder (less than half) of the range of motion  Quadriceps Extension Score: Slight increase, minimal resistance throughout the remainder (less than half) of the range of motion  Ankle Dorsiflexion-Gastrocnemius Score: Considerable increase  Ankle Dorsiflexion-Soleus Score: Considerable increase  Modified Doris Scale Comments: on the right        [place] Spasm Frequency Scale:        Score = 1     ----------------------------  Extensor spasms: no   Flexor spasms: no   Adductor spasms: no  Isometric spasms: not on exam  Complex spasms: no  Abnormal fixed posture or fixed dystonia: no  Paroxysmal focal dystonia: no  Inducible clonus: two beats of unsustained ankle clonus on the left  Spontaneous clonus: no   Myoclonus: no  Tremor: no  RLS/akathisia: no          Procedure  The risks, benefits, and alternatives of onabotulinumtoxinA (Botox) injection were discussed. The risks that were discussed include bleeding, infection, damage to local structures, over-weakness or under-weakness including limb weakness and trouble walking and rare incidents of systemic side effects including dysphagia or diplopia. The alternatives that were discussed include various  anti-spasmodic treatments, or no treatment at all. The patient gave verbal and signed informed consent for this procedure.    The patient was prepped in the usual sterile fashion, Onabotulinumtoxin A (Botox) was injected as follows:    Rt med GC 50 units, Rt lat GC 50 units, and Rt soleus 100 units.     Total units used was 200    EMG guidance was used for muscle localization.    The patient tolerated the procedure well and will return in 3 months for repeat injection.     Assessment/Plan   This is a 47 yo originally right handed female with hx of MS, breast cancer s/p lumpectomy/radiation/chemotherapy, and HTN who is referred for advanced spasticity management. Main concern is improving gait and right ankle stiffness. Exam positive for RLE weakness with moderate spasticity of the right hemibody most pronounced around the right ankle. No tonic spasms, myoclonus, or tremor on exam. oral baclofen has been ineffective in the past but she cannot recall the maximum dose she tried. Patient can be a good candidate for repeat oral baclofen trial and targeted botulinum toxin injection to the following muscles on the right: med GC 25 units, lat GC 25 units, and soleus 50 units. The doses can be increased over time as tolerated. ITB is not indicated given focal spasticity and lack of sufficient oral and injectable trials. she did not have that much signal on injection.     Mark Giron MD         Physical Exam  See above

## 2025-06-17 ENCOUNTER — TREATMENT (OUTPATIENT)
Dept: PHYSICAL THERAPY | Facility: CLINIC | Age: 55
End: 2025-06-17
Payer: COMMERCIAL

## 2025-06-17 DIAGNOSIS — R26.9 ABNORMAL GAIT: Primary | ICD-10-CM

## 2025-06-17 DIAGNOSIS — G35 MS (MULTIPLE SCLEROSIS) (MULTI): ICD-10-CM

## 2025-06-17 DIAGNOSIS — G35 MULTIPLE SCLEROSIS (MULTI): ICD-10-CM

## 2025-06-17 DIAGNOSIS — R29.898 RIGHT LEG WEAKNESS: ICD-10-CM

## 2025-06-17 DIAGNOSIS — R25.2 SPASTICITY: ICD-10-CM

## 2025-06-17 PROCEDURE — 97112 NEUROMUSCULAR REEDUCATION: CPT | Mod: GP | Performed by: PHYSICAL THERAPIST

## 2025-06-17 NOTE — PROGRESS NOTES
Physical Therapy    Physical Therapy Treatment    Patient Name: Shu Dimas  MRN: [id number]  Today's Date: 2025  Time Calculation  Start Time: 0800  Stop Time: 0845  Time Calculation (min): 45 min     PT Therapeutic Procedures Time Entry  Neuromuscular Re-Education Time Entry: 45       Visit number: 15 in  (last assessment 3/18/2025)  Insurance: [place]  Plan of Care Dates: 3/21/2025 - 2025; 2024 - 3/20/2025; 2024 - 2024  Authorized visits: 30 PT per year ([place])   Primary diagnosis: R LE weakness    Assessment:   Patient presents for follow-up PT session this date.  She reports she has been continuing to perform her stretches and exercises at home and is also doing more walking lately.  Patient reports she had Botox injection in R lower LE last week and understands the importance of continuing to stretch after finishing PT POC.  Patient tolerated all therapeutic activities well without increase in pain.  Patient has achieved all goals set at initial evaluation and is prepared to be discharged from outpatient PT services today.  She is very happy with her progress, feels stronger and that she is walking better.    Plan: Discharge PT POC today.  Patient to continue HEP with independence at home.    Current Problem  Problem List Items Addressed This Visit           ICD-10-CM    MS (multiple sclerosis) (Multi) G35    Abnormal gait - Primary R26.9    Right leg weakness R29.898    Spasticity R25.2    Multiple sclerosis (Multi) G35         Subjective    Patient reports she has continued following HEP at home and tolerated Botox shots fine last week.  One shot was more painful than usual, but is fine now.    Precautions:     Moderate Fall Risk based on L LE stiffness, weakness, impaired gait and coordination.  Significant falls history.    Pain  0/10 pain today.     Objective    R ankle DF in standin-4 with knee extended, 0-10 degrees with knee flexed.    Treatments:  NEUROMUSCULAR  REEDUCATION x 45 minutes  Patient entered and exited clinic ambulating with rolling walker and supervision 50' x 2.   Recumbent LE ergometer - LEs only L1 , 5 minutes F, 5 minutes B.  Reviewed previous HEP with good accuracy and demonstration: 5 - 10 reps per.    - standing L/R LE quad stretch with L/R knee on seat of chair, even and staggered stance.   - standing L/R PF stretch with foot on wedge, heel on floor.  Even and staggered stance, knees flexed/extended.   - seated L knee flexion/HS curl in dangle with red TB - tension with variable resistance facilitating ecentric, concentric and isometric HS contractions.   - seated bolster rolls - R/L, HS curl.   - cued for ballistic HS curl with increased force and range, red TB.  L DF activation/flexibility   - able to tolerate standing on wedge with heel on floor for PF stretch   - knee flexed and knee extended.   - patient reports she feels like L ankle is more flexible than R since Botox shots.    OP EDUCATION:   Review and demonstration of exercises indicated good recall of HEP - continue/restart.  Patient voices understanding and intended compliance.      Goals:  Resolved       PT Problem       TUG (Met)       Start:  09/17/24    Expected End:  05/30/25    Resolved:  03/18/25    Improve TUG time to < 18.5 secs with rollator         10 meter walk  (Met)       Start:  09/17/24    Expected End:  05/30/25    Resolved:  03/18/25    Improve 10 meter walk to < 18.0 secs         Gait (Met)       Start:  09/17/24    Expected End:  05/30/25    Resolved:  03/18/25    Patient will ambulate with greater right foot clearance, and less compensation for right swing. She will ambulate 210 ft with < 35 instances of toe drag/catching to improve safety and efficiency.          Right ankle Dorsiflexion (Met)       Start:  09/17/24    Expected End:  05/30/25    Resolved:  06/17/25    Right ankle DF PROM with knee extended > neutral, and knee flexed > 6 deg;            HEP (Met)        Start:  09/17/24    Expected End:  05/30/25    Resolved:  03/18/25    Patient will be independent with HEP to maximize her functional mobility         Patient stated goal (Met)       Start:  09/17/24    Expected End:  12/01/24    Resolved:  06/17/25    Reduce tightness and walk better

## 2025-06-30 PROCEDURE — RXMED WILLOW AMBULATORY MEDICATION CHARGE

## 2025-07-01 ENCOUNTER — PHARMACY VISIT (OUTPATIENT)
Dept: PHARMACY | Facility: CLINIC | Age: 55
End: 2025-07-01
Payer: COMMERCIAL

## 2025-07-01 DIAGNOSIS — G35 MULTIPLE SCLEROSIS (MULTI): ICD-10-CM

## 2025-07-30 PROCEDURE — RXMED WILLOW AMBULATORY MEDICATION CHARGE

## 2025-08-04 ENCOUNTER — PHARMACY VISIT (OUTPATIENT)
Dept: PHARMACY | Facility: CLINIC | Age: 55
End: 2025-08-04
Payer: COMMERCIAL

## 2025-08-18 ENCOUNTER — APPOINTMENT (OUTPATIENT)
Dept: INFUSION THERAPY | Facility: CLINIC | Age: 55
End: 2025-08-18
Payer: COMMERCIAL

## 2025-08-18 VITALS
HEART RATE: 99 BPM | RESPIRATION RATE: 16 BRPM | TEMPERATURE: 98.1 F | DIASTOLIC BLOOD PRESSURE: 60 MMHG | SYSTOLIC BLOOD PRESSURE: 102 MMHG | WEIGHT: 213.41 LBS | OXYGEN SATURATION: 93 % | BODY MASS INDEX: 34.44 KG/M2

## 2025-08-18 DIAGNOSIS — G35 MULTIPLE SCLEROSIS (MULTI): ICD-10-CM

## 2025-08-18 DIAGNOSIS — G35 MS (MULTIPLE SCLEROSIS) (MULTI): ICD-10-CM

## 2025-08-18 DIAGNOSIS — Z79.899 DRUG-INDUCED IMMUNODEFICIENCY (MULTI): ICD-10-CM

## 2025-08-18 DIAGNOSIS — D84.821 DRUG-INDUCED IMMUNODEFICIENCY (MULTI): ICD-10-CM

## 2025-08-18 LAB
BASOPHILS # BLD AUTO: 0.03 X10*3/UL (ref 0–0.1)
BASOPHILS NFR BLD AUTO: 0.5 %
EOSINOPHIL # BLD AUTO: 0.06 X10*3/UL (ref 0–0.7)
EOSINOPHIL NFR BLD AUTO: 1 %
ERYTHROCYTE [DISTWIDTH] IN BLOOD BY AUTOMATED COUNT: 13.1 % (ref 11.5–14.5)
HCT VFR BLD AUTO: 40.3 % (ref 36–46)
HGB BLD-MCNC: 13.4 G/DL (ref 12–16)
IGA SERPL-MCNC: 51 MG/DL (ref 70–400)
IGG SERPL-MCNC: 382 MG/DL (ref 700–1600)
IGM SERPL-MCNC: 22 MG/DL (ref 40–230)
IMM GRANULOCYTES # BLD AUTO: 0.02 X10*3/UL (ref 0–0.7)
IMM GRANULOCYTES NFR BLD AUTO: 0.3 % (ref 0–0.9)
LYMPHOCYTES # BLD AUTO: 1.07 X10*3/UL (ref 1.2–4.8)
LYMPHOCYTES NFR BLD AUTO: 18 %
MCH RBC QN AUTO: 30 PG (ref 26–34)
MCHC RBC AUTO-ENTMCNC: 33.3 G/DL (ref 32–36)
MCV RBC AUTO: 90 FL (ref 80–100)
MONOCYTES # BLD AUTO: 0.34 X10*3/UL (ref 0.1–1)
MONOCYTES NFR BLD AUTO: 5.7 %
NEUTROPHILS # BLD AUTO: 4.42 X10*3/UL (ref 1.2–7.7)
NEUTROPHILS NFR BLD AUTO: 74.5 %
NRBC BLD-RTO: 0 /100 WBCS (ref 0–0)
PLATELET # BLD AUTO: 226 X10*3/UL (ref 150–450)
RBC # BLD AUTO: 4.47 X10*6/UL (ref 4–5.2)
WBC # BLD AUTO: 5.9 X10*3/UL (ref 4.4–11.3)

## 2025-08-18 PROCEDURE — 96415 CHEMO IV INFUSION ADDL HR: CPT | Performed by: NURSE PRACTITIONER

## 2025-08-18 PROCEDURE — 96413 CHEMO IV INFUSION 1 HR: CPT | Performed by: NURSE PRACTITIONER

## 2025-08-18 PROCEDURE — 88187 FLOWCYTOMETRY/READ 2-8: CPT | Performed by: PSYCHIATRY & NEUROLOGY

## 2025-08-18 PROCEDURE — 96375 TX/PRO/DX INJ NEW DRUG ADDON: CPT | Performed by: NURSE PRACTITIONER

## 2025-08-18 PROCEDURE — 88184 FLOWCYTOMETRY/ TC 1 MARKER: CPT

## 2025-08-18 PROCEDURE — 82784 ASSAY IGA/IGD/IGG/IGM EACH: CPT

## 2025-08-18 PROCEDURE — 85025 COMPLETE CBC W/AUTO DIFF WBC: CPT

## 2025-08-18 PROCEDURE — 88185 FLOWCYTOMETRY/TC ADD-ON: CPT

## 2025-08-18 RX ORDER — ONDANSETRON HYDROCHLORIDE 2 MG/ML
4 INJECTION, SOLUTION INTRAVENOUS ONCE
Status: COMPLETED | OUTPATIENT
Start: 2025-08-18 | End: 2025-08-18

## 2025-08-18 RX ORDER — ACETAMINOPHEN 325 MG/1
650 TABLET ORAL ONCE
Status: COMPLETED | OUTPATIENT
Start: 2025-08-18 | End: 2025-08-18

## 2025-08-18 RX ORDER — EPINEPHRINE 0.3 MG/.3ML
0.3 INJECTION SUBCUTANEOUS EVERY 5 MIN PRN
OUTPATIENT
Start: 2025-08-20

## 2025-08-18 RX ORDER — DIPHENHYDRAMINE HYDROCHLORIDE 50 MG/ML
50 INJECTION, SOLUTION INTRAMUSCULAR; INTRAVENOUS AS NEEDED
OUTPATIENT
Start: 2025-08-20

## 2025-08-18 RX ORDER — ACETAMINOPHEN 325 MG/1
650 TABLET ORAL ONCE
OUTPATIENT
Start: 2025-08-20

## 2025-08-18 RX ORDER — FAMOTIDINE 10 MG/ML
20 INJECTION, SOLUTION INTRAVENOUS ONCE AS NEEDED
OUTPATIENT
Start: 2025-08-20

## 2025-08-18 RX ORDER — ONDANSETRON HYDROCHLORIDE 2 MG/ML
4 INJECTION, SOLUTION INTRAVENOUS ONCE
OUTPATIENT
Start: 2025-08-20 | End: 2025-08-20

## 2025-08-18 RX ORDER — DIPHENHYDRAMINE HYDROCHLORIDE 50 MG/ML
25 INJECTION, SOLUTION INTRAMUSCULAR; INTRAVENOUS ONCE
Status: COMPLETED | OUTPATIENT
Start: 2025-08-18 | End: 2025-08-18

## 2025-08-18 RX ORDER — HEPARIN 100 UNIT/ML
500 SYRINGE INTRAVENOUS AS NEEDED
Status: DISCONTINUED | OUTPATIENT
Start: 2025-08-18 | End: 2025-08-18 | Stop reason: HOSPADM

## 2025-08-18 RX ORDER — DIPHENHYDRAMINE HYDROCHLORIDE 50 MG/ML
25 INJECTION, SOLUTION INTRAMUSCULAR; INTRAVENOUS ONCE
OUTPATIENT
Start: 2025-08-20

## 2025-08-18 RX ORDER — ALBUTEROL SULFATE 0.83 MG/ML
3 SOLUTION RESPIRATORY (INHALATION) AS NEEDED
OUTPATIENT
Start: 2025-08-20

## 2025-08-18 RX ORDER — HEPARIN 100 UNIT/ML
500 SYRINGE INTRAVENOUS AS NEEDED
OUTPATIENT
Start: 2025-08-18

## 2025-08-18 RX ADMIN — ONDANSETRON HYDROCHLORIDE 4 MG: 2 INJECTION, SOLUTION INTRAVENOUS at 08:56

## 2025-08-18 RX ADMIN — HEPARIN 500 UNITS: 100 SYRINGE at 14:16

## 2025-08-18 RX ADMIN — ACETAMINOPHEN 650 MG: 325 TABLET ORAL at 08:56

## 2025-08-18 RX ADMIN — DIPHENHYDRAMINE HYDROCHLORIDE 25 MG: 50 INJECTION, SOLUTION INTRAMUSCULAR; INTRAVENOUS at 08:56

## 2025-08-18 ASSESSMENT — ENCOUNTER SYMPTOMS
COUGH: 1
APPETITE CHANGE: 0
TROUBLE SWALLOWING: 0
DEPRESSION: 0
MYALGIAS: 0
VOICE CHANGE: 0
CHILLS: 0
BRUISES/BLEEDS EASILY: 0
SHORTNESS OF BREATH: 0
DIZZINESS: 0
UNEXPECTED WEIGHT CHANGE: 0
NUMBNESS: 0
FATIGUE: 0
DYSURIA: 0
CONSTIPATION: 1
LIGHT-HEADEDNESS: 0
HEMATURIA: 0
LEG SWELLING: 0
NERVOUS/ANXIOUS: 0
ARTHRALGIAS: 0
NAUSEA: 0
PALPITATIONS: 0
EXTREMITY WEAKNESS: 0
FREQUENCY: 0
BLOOD IN STOOL: 0
WOUND: 0
EYE PROBLEMS: 0
VOMITING: 0
HEADACHES: 0
ABDOMINAL PAIN: 0
SORE THROAT: 0
FEVER: 0
DIARRHEA: 0
WHEEZING: 0

## 2025-08-22 LAB
CD19 CELLS NFR BLD: <0.1 % (ref 6–19)
FLOW CYTOMETRY SPECIALIST REVIEW: ABNORMAL
PATH REVIEW, B CELL PHENOTYPING, EXTENDED: ABNORMAL

## 2025-08-28 DIAGNOSIS — G35 MULTIPLE SCLEROSIS (MULTI): ICD-10-CM

## 2025-08-28 PROCEDURE — RXMED WILLOW AMBULATORY MEDICATION CHARGE

## 2025-08-28 RX ORDER — DALFAMPRIDINE 10 MG/1
10 TABLET, FILM COATED, EXTENDED RELEASE ORAL 2 TIMES DAILY
Qty: 60 TABLET | Refills: 5 | Status: SHIPPED | OUTPATIENT
Start: 2025-08-28

## 2025-09-02 ENCOUNTER — SPECIALTY PHARMACY (OUTPATIENT)
Dept: PHARMACY | Facility: CLINIC | Age: 55
End: 2025-09-02

## 2025-09-03 ENCOUNTER — PHARMACY VISIT (OUTPATIENT)
Dept: PHARMACY | Facility: CLINIC | Age: 55
End: 2025-09-03
Payer: COMMERCIAL

## 2025-09-04 DIAGNOSIS — D84.821 DRUG-INDUCED IMMUNODEFICIENCY (MULTI): Primary | ICD-10-CM

## 2025-09-04 DIAGNOSIS — Z79.899 DRUG-INDUCED IMMUNODEFICIENCY (MULTI): Primary | ICD-10-CM

## 2025-09-12 ENCOUNTER — APPOINTMENT (OUTPATIENT)
Dept: NEUROLOGY | Facility: CLINIC | Age: 55
End: 2025-09-12
Payer: COMMERCIAL

## 2026-02-19 ENCOUNTER — APPOINTMENT (OUTPATIENT)
Dept: INFUSION THERAPY | Facility: CLINIC | Age: 56
End: 2026-02-19
Payer: COMMERCIAL

## 2026-03-17 ENCOUNTER — APPOINTMENT (OUTPATIENT)
Dept: UROLOGY | Facility: CLINIC | Age: 56
End: 2026-03-17
Payer: COMMERCIAL